# Patient Record
Sex: FEMALE | Race: WHITE | Employment: OTHER | URBAN - METROPOLITAN AREA
[De-identification: names, ages, dates, MRNs, and addresses within clinical notes are randomized per-mention and may not be internally consistent; named-entity substitution may affect disease eponyms.]

---

## 2017-01-04 ENCOUNTER — OFFICE VISIT (OUTPATIENT)
Dept: PULMONOLOGY | Age: 42
End: 2017-01-04

## 2017-01-04 VITALS
HEIGHT: 66 IN | WEIGHT: 205 LBS | RESPIRATION RATE: 18 BRPM | HEART RATE: 89 BPM | OXYGEN SATURATION: 98 % | DIASTOLIC BLOOD PRESSURE: 84 MMHG | TEMPERATURE: 97.8 F | SYSTOLIC BLOOD PRESSURE: 132 MMHG | BODY MASS INDEX: 32.95 KG/M2

## 2017-01-04 DIAGNOSIS — R59.0 MEDIASTINAL LYMPHADENOPATHY: Primary | ICD-10-CM

## 2017-01-04 PROCEDURE — 99243 OFF/OP CNSLTJ NEW/EST LOW 30: CPT | Performed by: INTERNAL MEDICINE

## 2017-01-04 RX ORDER — ASCORBIC ACID 500 MG
500 TABLET ORAL DAILY
COMMUNITY

## 2017-01-04 RX ORDER — THIAMINE MONONITRATE (VIT B1) 100 MG
100 TABLET ORAL DAILY
Status: ON HOLD | COMMUNITY
End: 2018-09-22

## 2017-01-25 ENCOUNTER — TELEPHONE (OUTPATIENT)
Dept: FAMILY MEDICINE CLINIC | Age: 42
End: 2017-01-25

## 2017-03-13 ENCOUNTER — TELEPHONE (OUTPATIENT)
Dept: PULMONOLOGY | Age: 42
End: 2017-03-13

## 2017-03-15 ENCOUNTER — HOSPITAL ENCOUNTER (OUTPATIENT)
Dept: CT IMAGING | Age: 42
Discharge: HOME | End: 2018-01-14
Attending: INTERNAL MEDICINE | Admitting: INTERNAL MEDICINE

## 2017-03-15 DIAGNOSIS — R59.0 LOCALIZED ENLARGED LYMPH NODES: ICD-10-CM

## 2017-04-20 ENCOUNTER — TELEPHONE (OUTPATIENT)
Dept: FAMILY MEDICINE CLINIC | Age: 42
End: 2017-04-20

## 2017-04-27 ENCOUNTER — OFFICE VISIT (OUTPATIENT)
Dept: FAMILY MEDICINE CLINIC | Age: 42
End: 2017-04-27

## 2017-04-27 VITALS
OXYGEN SATURATION: 98 % | WEIGHT: 216.6 LBS | BODY MASS INDEX: 34.81 KG/M2 | HEIGHT: 66 IN | TEMPERATURE: 98 F | RESPIRATION RATE: 14 BRPM | SYSTOLIC BLOOD PRESSURE: 102 MMHG | DIASTOLIC BLOOD PRESSURE: 72 MMHG | HEART RATE: 74 BPM

## 2017-04-27 DIAGNOSIS — R53.83 FATIGUE, UNSPECIFIED TYPE: Primary | ICD-10-CM

## 2017-04-27 DIAGNOSIS — D64.9 ANEMIA, UNSPECIFIED TYPE: ICD-10-CM

## 2017-04-27 DIAGNOSIS — R53.83 FATIGUE, UNSPECIFIED TYPE: ICD-10-CM

## 2017-04-27 LAB
A/G RATIO: 1.5 (ref 1.1–2.2)
ALBUMIN SERPL-MCNC: 4.1 G/DL (ref 3.4–5)
ALP BLD-CCNC: 75 U/L (ref 40–129)
ALT SERPL-CCNC: 29 U/L (ref 10–40)
ANION GAP SERPL CALCULATED.3IONS-SCNC: 15 MMOL/L (ref 3–16)
AST SERPL-CCNC: 21 U/L (ref 15–37)
BASOPHILS ABSOLUTE: 0 K/UL (ref 0–0.2)
BASOPHILS RELATIVE PERCENT: 0.4 %
BILIRUB SERPL-MCNC: <0.2 MG/DL (ref 0–1)
BUN BLDV-MCNC: 16 MG/DL (ref 7–20)
CALCIUM SERPL-MCNC: 8.7 MG/DL (ref 8.3–10.6)
CHLORIDE BLD-SCNC: 101 MMOL/L (ref 99–110)
CO2: 23 MMOL/L (ref 21–32)
CREAT SERPL-MCNC: 0.6 MG/DL (ref 0.6–1.1)
EOSINOPHILS ABSOLUTE: 0.1 K/UL (ref 0–0.6)
EOSINOPHILS RELATIVE PERCENT: 1.8 %
FERRITIN: 24.7 NG/ML (ref 15–150)
FOLATE: 19.46 NG/ML (ref 4.78–24.2)
GFR AFRICAN AMERICAN: >60
GFR NON-AFRICAN AMERICAN: >60
GLOBULIN: 2.8 G/DL
GLUCOSE BLD-MCNC: 104 MG/DL (ref 70–99)
HCT VFR BLD CALC: 35.8 % (ref 36–48)
HEMOGLOBIN: 11.2 G/DL (ref 12–16)
IRON SATURATION: 13 % (ref 15–50)
IRON: 48 UG/DL (ref 37–145)
LYMPHOCYTES ABSOLUTE: 1.7 K/UL (ref 1–5.1)
LYMPHOCYTES RELATIVE PERCENT: 20.7 %
MCH RBC QN AUTO: 24.2 PG (ref 26–34)
MCHC RBC AUTO-ENTMCNC: 31.2 G/DL (ref 31–36)
MCV RBC AUTO: 77.6 FL (ref 80–100)
MONOCYTES ABSOLUTE: 0.6 K/UL (ref 0–1.3)
MONOCYTES RELATIVE PERCENT: 7.8 %
NEUTROPHILS ABSOLUTE: 5.7 K/UL (ref 1.7–7.7)
NEUTROPHILS RELATIVE PERCENT: 69.3 %
PDW BLD-RTO: 22.8 % (ref 12.4–15.4)
PLATELET # BLD: 287 K/UL (ref 135–450)
PMV BLD AUTO: 9.3 FL (ref 5–10.5)
POTASSIUM SERPL-SCNC: 4.1 MMOL/L (ref 3.5–5.1)
RBC # BLD: 4.62 M/UL (ref 4–5.2)
SODIUM BLD-SCNC: 139 MMOL/L (ref 136–145)
TOTAL IRON BINDING CAPACITY: 376 UG/DL (ref 260–445)
TOTAL PROTEIN: 6.9 G/DL (ref 6.4–8.2)
TSH REFLEX: 2.76 UIU/ML (ref 0.27–4.2)
VITAMIN B-12: 507 PG/ML (ref 211–911)
WBC # BLD: 8.2 K/UL (ref 4–11)

## 2017-04-27 PROCEDURE — 99213 OFFICE O/P EST LOW 20 MIN: CPT | Performed by: NURSE PRACTITIONER

## 2017-04-27 ASSESSMENT — PATIENT HEALTH QUESTIONNAIRE - PHQ9
1. LITTLE INTEREST OR PLEASURE IN DOING THINGS: 0
SUM OF ALL RESPONSES TO PHQ9 QUESTIONS 1 & 2: 0
2. FEELING DOWN, DEPRESSED OR HOPELESS: 0
SUM OF ALL RESPONSES TO PHQ QUESTIONS 1-9: 0

## 2017-04-27 ASSESSMENT — ENCOUNTER SYMPTOMS
VISUAL CHANGE: 0
SWOLLEN GLANDS: 0
VOMITING: 0
NAUSEA: 0
CHANGE IN BOWEL HABIT: 0
COUGH: 0
SORE THROAT: 0
ABDOMINAL PAIN: 0

## 2017-04-28 DIAGNOSIS — D50.9 IRON DEFICIENCY ANEMIA, UNSPECIFIED IRON DEFICIENCY ANEMIA TYPE: Primary | ICD-10-CM

## 2017-08-01 ENCOUNTER — OFFICE VISIT (OUTPATIENT)
Dept: FAMILY MEDICINE CLINIC | Age: 42
End: 2017-08-01

## 2017-08-01 VITALS
HEART RATE: 75 BPM | BODY MASS INDEX: 35.52 KG/M2 | OXYGEN SATURATION: 98 % | TEMPERATURE: 98.1 F | HEIGHT: 66 IN | DIASTOLIC BLOOD PRESSURE: 76 MMHG | SYSTOLIC BLOOD PRESSURE: 130 MMHG | WEIGHT: 221 LBS

## 2017-08-01 DIAGNOSIS — J45.20 RAD (REACTIVE AIRWAY DISEASE), MILD INTERMITTENT, UNCOMPLICATED: ICD-10-CM

## 2017-08-01 DIAGNOSIS — J01.00 ACUTE NON-RECURRENT MAXILLARY SINUSITIS: Primary | ICD-10-CM

## 2017-08-01 PROCEDURE — 99213 OFFICE O/P EST LOW 20 MIN: CPT | Performed by: NURSE PRACTITIONER

## 2017-08-01 RX ORDER — ALBUTEROL SULFATE 90 UG/1
2 AEROSOL, METERED RESPIRATORY (INHALATION) EVERY 6 HOURS PRN
Qty: 1 INHALER | Refills: 0 | Status: SHIPPED | OUTPATIENT
Start: 2017-08-01 | End: 2017-08-11 | Stop reason: SINTOL

## 2017-08-01 RX ORDER — AZITHROMYCIN 250 MG/1
250 TABLET, FILM COATED ORAL DAILY
Qty: 6 TABLET | Refills: 0 | Status: SHIPPED | OUTPATIENT
Start: 2017-08-01 | End: 2017-08-09

## 2017-08-01 RX ORDER — BENZONATATE 100 MG/1
100 CAPSULE ORAL 3 TIMES DAILY PRN
Qty: 30 CAPSULE | Refills: 0 | Status: SHIPPED | OUTPATIENT
Start: 2017-08-01 | End: 2017-08-09

## 2017-08-01 RX ORDER — ATENOLOL 25 MG/1
TABLET ORAL
Refills: 11 | COMMUNITY
Start: 2017-07-07 | End: 2018-06-26

## 2017-08-01 ASSESSMENT — ENCOUNTER SYMPTOMS
RECTAL PAIN: 0
DIARRHEA: 1
COUGH: 1
NAUSEA: 0
BLOOD IN STOOL: 0
STRIDOR: 0
CHOKING: 0
HEARTBURN: 0
BACK PAIN: 0
TROUBLE SWALLOWING: 0
VOICE CHANGE: 0
CHEST TIGHTNESS: 0
WHEEZING: 0
SHORTNESS OF BREATH: 1
VOMITING: 0
ANAL BLEEDING: 0
SORE THROAT: 1
FACIAL SWELLING: 0
APNEA: 0
RHINORRHEA: 0
CONSTIPATION: 0
SINUS PRESSURE: 1
ABDOMINAL DISTENTION: 0
ABDOMINAL PAIN: 0
HEMOPTYSIS: 0

## 2017-08-09 ENCOUNTER — OFFICE VISIT (OUTPATIENT)
Dept: FAMILY MEDICINE CLINIC | Age: 42
End: 2017-08-09

## 2017-08-09 VITALS
HEART RATE: 75 BPM | WEIGHT: 222 LBS | BODY MASS INDEX: 35.68 KG/M2 | TEMPERATURE: 97.7 F | DIASTOLIC BLOOD PRESSURE: 90 MMHG | RESPIRATION RATE: 16 BRPM | OXYGEN SATURATION: 99 % | SYSTOLIC BLOOD PRESSURE: 150 MMHG | HEIGHT: 66 IN

## 2017-08-09 DIAGNOSIS — J40 BRONCHITIS: Primary | ICD-10-CM

## 2017-08-09 DIAGNOSIS — R00.2 HEART PALPITATIONS: ICD-10-CM

## 2017-08-09 PROCEDURE — 93000 ELECTROCARDIOGRAM COMPLETE: CPT | Performed by: NURSE PRACTITIONER

## 2017-08-09 PROCEDURE — 99213 OFFICE O/P EST LOW 20 MIN: CPT | Performed by: NURSE PRACTITIONER

## 2017-08-09 RX ORDER — FLUTICASONE PROPIONATE 50 MCG
2 SPRAY, SUSPENSION (ML) NASAL DAILY
Qty: 16 G | Refills: 0 | Status: SHIPPED | OUTPATIENT
Start: 2017-08-09 | End: 2018-06-06 | Stop reason: ALTCHOICE

## 2017-08-09 RX ORDER — METHYLPREDNISOLONE 4 MG/1
TABLET ORAL
Qty: 1 KIT | Refills: 0 | Status: ON HOLD | OUTPATIENT
Start: 2017-08-09 | End: 2017-08-14

## 2017-08-09 ASSESSMENT — ENCOUNTER SYMPTOMS
WHEEZING: 0
TROUBLE SWALLOWING: 0
CHEST TIGHTNESS: 0
SPUTUM PRODUCTION: 1
BACK PAIN: 1
STRIDOR: 0
SHORTNESS OF BREATH: 1
SORE THROAT: 1
ORTHOPNEA: 0

## 2017-08-12 PROBLEM — J20.9 ACUTE BRONCHITIS: Status: ACTIVE | Noted: 2017-08-12

## 2017-08-12 PROBLEM — J01.90 ACUTE SINUSITIS: Status: ACTIVE | Noted: 2017-08-12

## 2017-08-12 PROBLEM — R65.10 SIRS (SYSTEMIC INFLAMMATORY RESPONSE SYNDROME) (HCC): Status: ACTIVE | Noted: 2017-08-12

## 2017-08-12 PROBLEM — J06.9 URI (UPPER RESPIRATORY INFECTION): Status: ACTIVE | Noted: 2017-08-12

## 2017-08-12 PROBLEM — R06.02 SOB (SHORTNESS OF BREATH): Status: ACTIVE | Noted: 2017-08-12

## 2017-08-12 PROBLEM — E66.9 OBESITY (BMI 30-39.9): Chronic | Status: ACTIVE | Noted: 2017-08-12

## 2017-08-12 PROBLEM — Z87.891 FORMER SMOKER: Chronic | Status: ACTIVE | Noted: 2017-08-12

## 2017-08-12 PROBLEM — R53.83 FATIGUE: Status: ACTIVE | Noted: 2017-08-12

## 2017-08-12 PROBLEM — E87.20 METABOLIC ACIDOSIS, NORMAL ANION GAP (NAG): Status: ACTIVE | Noted: 2017-08-12

## 2017-08-12 PROBLEM — A41.9 SEPSIS (HCC): Status: ACTIVE | Noted: 2017-08-12

## 2017-08-12 PROBLEM — R07.9 CHEST PAIN: Status: ACTIVE | Noted: 2017-08-12

## 2017-08-12 PROBLEM — R53.83 FATIGUE: Chronic | Status: ACTIVE | Noted: 2017-08-12

## 2017-08-15 ENCOUNTER — CARE COORDINATION (OUTPATIENT)
Dept: CARE COORDINATION | Age: 42
End: 2017-08-15

## 2017-08-15 ENCOUNTER — TELEPHONE (OUTPATIENT)
Dept: PULMONOLOGY | Age: 42
End: 2017-08-15

## 2017-08-22 ENCOUNTER — OFFICE VISIT (OUTPATIENT)
Dept: PULMONOLOGY | Age: 42
End: 2017-08-22

## 2017-08-22 VITALS
DIASTOLIC BLOOD PRESSURE: 81 MMHG | WEIGHT: 221 LBS | OXYGEN SATURATION: 98 % | HEIGHT: 66 IN | SYSTOLIC BLOOD PRESSURE: 134 MMHG | HEART RATE: 62 BPM | TEMPERATURE: 98 F | BODY MASS INDEX: 35.52 KG/M2 | RESPIRATION RATE: 16 BRPM

## 2017-08-22 DIAGNOSIS — R59.0 MEDIASTINAL LYMPHADENOPATHY: Primary | ICD-10-CM

## 2017-08-22 PROCEDURE — 99213 OFFICE O/P EST LOW 20 MIN: CPT | Performed by: INTERNAL MEDICINE

## 2018-02-08 ENCOUNTER — OFFICE VISIT (OUTPATIENT)
Dept: PULMONOLOGY | Age: 43
End: 2018-02-08

## 2018-02-08 VITALS
HEIGHT: 66 IN | OXYGEN SATURATION: 99 % | BODY MASS INDEX: 35.36 KG/M2 | HEART RATE: 74 BPM | SYSTOLIC BLOOD PRESSURE: 124 MMHG | WEIGHT: 220 LBS | TEMPERATURE: 98.4 F | RESPIRATION RATE: 18 BRPM | DIASTOLIC BLOOD PRESSURE: 76 MMHG

## 2018-02-08 DIAGNOSIS — R06.83 SNORING: Primary | ICD-10-CM

## 2018-02-08 DIAGNOSIS — R06.81 WITNESSED EPISODE OF APNEA: ICD-10-CM

## 2018-02-08 PROCEDURE — 99213 OFFICE O/P EST LOW 20 MIN: CPT | Performed by: NURSE PRACTITIONER

## 2018-02-08 ASSESSMENT — SLEEP AND FATIGUE QUESTIONNAIRES
HOW LIKELY ARE YOU TO NOD OFF OR FALL ASLEEP WHILE WATCHING TV: 1
HOW LIKELY ARE YOU TO NOD OFF OR FALL ASLEEP WHILE SITTING QUIETLY AFTER LUNCH WITHOUT ALCOHOL: 0
HOW LIKELY ARE YOU TO NOD OFF OR FALL ASLEEP WHILE LYING DOWN TO REST IN THE AFTERNOON WHEN CIRCUMSTANCES PERMIT: 1
NECK CIRCUMFERENCE (INCHES): 16
HOW LIKELY ARE YOU TO NOD OFF OR FALL ASLEEP WHILE SITTING AND TALKING TO SOMEONE: 0
HOW LIKELY ARE YOU TO NOD OFF OR FALL ASLEEP IN A CAR, WHILE STOPPED FOR A FEW MINUTES IN TRAFFIC: 0
HOW LIKELY ARE YOU TO NOD OFF OR FALL ASLEEP WHILE SITTING INACTIVE IN A PUBLIC PLACE: 0
ESS TOTAL SCORE: 2
HOW LIKELY ARE YOU TO NOD OFF OR FALL ASLEEP WHILE SITTING AND READING: 0
HOW LIKELY ARE YOU TO NOD OFF OR FALL ASLEEP WHEN YOU ARE A PASSENGER IN A CAR FOR AN HOUR WITHOUT A BREAK: 0

## 2018-02-08 NOTE — PROGRESS NOTES
Laterality Date    APPENDECTOMY      CORONARY ANGIOPLASTY WITH STENT PLACEMENT      KNEE ARTHROSCOPY      LEG SURGERY      NECK SURGERY      TONSILLECTOMY         Allergies:  has No Known Allergies. Social History:    TOBACCO:   reports that she quit smoking about 15 months ago. Her smoking use included Cigarettes. She has a 5.00 pack-year smoking history. She has never used smokeless tobacco.  ETOH:   reports that she drinks alcohol. Family History:       Problem Relation Age of Onset    Heart Disease Mother     High Blood Pressure Mother     Heart Disease Father     High Blood Pressure Father        Current Medications:    Current Outpatient Prescriptions:     famotidine (PEPCID) 20 MG tablet, Take 1 tablet by mouth 2 times daily While taking steroids, Disp: 10 tablet, Rfl: 3    vitamin D3 (CHOLECALCIFEROL) 400 units TABS tablet, Take 2 tablets by mouth daily, Disp: 30 tablet, Rfl: 0    fluticasone (FLONASE) 50 MCG/ACT nasal spray, 2 sprays by Nasal route daily, Disp: 16 g, Rfl: 0    atenolol (TENORMIN) 25 MG tablet, TAKE 1 TABLET BY MOUTH ONE TIME A DAY, Disp: , Rfl: 11    Coenzyme Q10 (CO Q 10 PO), Take by mouth, Disp: , Rfl:     vitamin B-1 (THIAMINE) 100 MG tablet, Take 100 mg by mouth daily, Disp: , Rfl:     Ascorbic Acid (VITAMIN C) 500 MG tablet, Take 500 mg by mouth daily, Disp: , Rfl:     nitroGLYCERIN (NITRODUR) 0.4 MG/HR, Place 1 patch onto the skin daily, Disp: , Rfl:     atorvastatin (LIPITOR) 80 MG tablet, Take 80 mg by mouth daily, Disp: , Rfl:     aspirin 81 MG tablet, Take 81 mg by mouth daily, Disp: , Rfl:     therapeutic multivitamin-minerals (THERAGRAN-M) tablet, Take 1 tablet by mouth daily.   , Disp: , Rfl:       REVIEW OF SYSTEMS:  Constitutional: Negative for fever  HENT: Negative for sore throat  Eyes: Negative for redness   Respiratory: Negative for dyspnea, cough  Cardiovascular: Negative for chest pain  Gastrointestinal: Negative for vomiting, diarrhea

## 2018-03-12 ENCOUNTER — HOSPITAL ENCOUNTER (OUTPATIENT)
Dept: SLEEP MEDICINE | Age: 43
Discharge: HOME | End: 2018-03-12
Attending: NURSE PRACTITIONER | Admitting: NURSE PRACTITIONER

## 2018-03-12 DIAGNOSIS — R06.83 SNORING: ICD-10-CM

## 2018-03-12 DIAGNOSIS — R06.81 WITNESSED EPISODE OF APNEA: ICD-10-CM

## 2018-03-15 DIAGNOSIS — G47.33 OSA (OBSTRUCTIVE SLEEP APNEA): Primary | ICD-10-CM

## 2018-03-19 ENCOUNTER — OFFICE VISIT (OUTPATIENT)
Dept: FAMILY MEDICINE CLINIC | Age: 43
End: 2018-03-19

## 2018-03-19 VITALS
HEART RATE: 67 BPM | WEIGHT: 227 LBS | SYSTOLIC BLOOD PRESSURE: 136 MMHG | DIASTOLIC BLOOD PRESSURE: 88 MMHG | BODY MASS INDEX: 36.48 KG/M2 | HEIGHT: 66 IN | OXYGEN SATURATION: 97 %

## 2018-03-19 DIAGNOSIS — J02.9 ACUTE PHARYNGITIS, UNSPECIFIED ETIOLOGY: Primary | ICD-10-CM

## 2018-03-19 LAB
INFLUENZA A ANTIBODY: NORMAL
INFLUENZA B ANTIBODY: NORMAL

## 2018-03-19 PROCEDURE — 87804 INFLUENZA ASSAY W/OPTIC: CPT | Performed by: NURSE PRACTITIONER

## 2018-03-19 PROCEDURE — 99213 OFFICE O/P EST LOW 20 MIN: CPT | Performed by: NURSE PRACTITIONER

## 2018-03-19 RX ORDER — AMOXICILLIN 500 MG/1
500 CAPSULE ORAL 2 TIMES DAILY
Qty: 20 CAPSULE | Refills: 0 | Status: SHIPPED | OUTPATIENT
Start: 2018-03-19 | End: 2018-03-29

## 2018-03-19 ASSESSMENT — ENCOUNTER SYMPTOMS
SORE THROAT: 1
VISUAL CHANGE: 0
RHINORRHEA: 1
COUGH: 1
CHEST TIGHTNESS: 1
SWOLLEN GLANDS: 1
WHEEZING: 0
NAUSEA: 1
CHANGE IN BOWEL HABIT: 1
FLU SYMPTOMS: 1
SHORTNESS OF BREATH: 1
VOMITING: 0
SINUS PRESSURE: 1
ABDOMINAL PAIN: 0
FACIAL SWELLING: 0
DIARRHEA: 1

## 2018-03-19 NOTE — PATIENT INSTRUCTIONS
Amoxicillin twice a day for 10 days   Increase fluid intake  Rest  Follow up if 1 week if no improvement    Ideally your blood pressure goal should be below 130/80. I usually add or adjust medication if the blood pressure is consistently elevated above 140/90. You can learn more about blood pressure and ways to incorporate a healthy lifestlye to help treat it from the American Heart Association website: www.heart. org under the  Getting Healthy link, and the [address] website: www.nhlbi.nih.gov/hbp    A for Activity  It helps your blood pressure when you exercise regularly. You should try to exercise at least 3 times a week for 20 minutes at a pace that you can comfortably carry on a conversation without being out of breath. B for BMI  The Body Mass Index should be below 30. This is your weight and height measurement. A BMI below 30 is preferred to help lower the risk of Type 2 diabetes, high cholesterol, heart disease, high blood pressure, sleep apnea, gallbladder disease and strokes. C for Control your Salt Intake  Avoid adding salt to your food. Avoid processed food, fast food, and junk food which all has high levels of sodium added. Read labels and get no more than 1500-2300mg of sodium a day.

## 2018-03-21 ENCOUNTER — TELEPHONE (OUTPATIENT)
Dept: PULMONOLOGY | Age: 43
End: 2018-03-21

## 2018-03-21 NOTE — TELEPHONE ENCOUNTER
Pt called and states that she was to have PAP orders sent to [facility] last week, and she called [facility] and they do not have orders. Printed and re-faxed orders. Will f/u to make sure orders rec'd.

## 2018-03-22 ENCOUNTER — TELEPHONE (OUTPATIENT)
Dept: FAMILY MEDICINE CLINIC | Age: 43
End: 2018-03-22

## 2018-03-22 NOTE — TELEPHONE ENCOUNTER
cm 0.6 - 0.9     Aorta                                 Value    Reference   Aortic root ID, ED                    3.1   cm <4.2    Legend:  (L)  and  (H)  mj values outside specified reference range.     Layton Lopez MD, PhD  [id number]

## 2018-06-06 PROBLEM — J01.90 ACUTE SINUSITIS: Status: RESOLVED | Noted: 2017-08-12 | Resolved: 2018-06-06

## 2018-06-06 PROBLEM — A41.9 SEPSIS (HCC): Status: RESOLVED | Noted: 2017-08-12 | Resolved: 2018-06-06

## 2018-06-06 PROBLEM — G47.33 OSA ON CPAP: Chronic | Status: ACTIVE | Noted: 2018-06-06

## 2018-06-06 PROBLEM — I16.0 HYPERTENSIVE URGENCY: Status: ACTIVE | Noted: 2018-06-06

## 2018-06-06 PROBLEM — Z99.89 OSA ON CPAP: Chronic | Status: ACTIVE | Noted: 2018-06-06

## 2018-06-06 PROBLEM — K21.9 GERD (GASTROESOPHAGEAL REFLUX DISEASE): Chronic | Status: ACTIVE | Noted: 2018-06-06

## 2018-06-06 PROBLEM — R06.02 SOB (SHORTNESS OF BREATH): Status: RESOLVED | Noted: 2017-08-12 | Resolved: 2018-06-06

## 2018-06-06 PROBLEM — D72.829 LEUKOCYTOSIS: Status: ACTIVE | Noted: 2018-06-06

## 2018-06-06 PROBLEM — J20.9 ACUTE BRONCHITIS: Status: RESOLVED | Noted: 2017-08-12 | Resolved: 2018-06-06

## 2018-06-07 ENCOUNTER — CARE COORDINATION (OUTPATIENT)
Dept: CASE MANAGEMENT | Age: 43
End: 2018-06-07

## 2018-06-07 DIAGNOSIS — E66.9 OBESITY (BMI 30-39.9): Primary | Chronic | ICD-10-CM

## 2018-06-07 PROCEDURE — 1111F DSCHRG MED/CURRENT MED MERGE: CPT | Performed by: FAMILY MEDICINE

## 2018-06-26 ENCOUNTER — OFFICE VISIT (OUTPATIENT)
Dept: PULMONOLOGY | Age: 43
End: 2018-06-26

## 2018-06-26 VITALS
HEIGHT: 66 IN | WEIGHT: 232 LBS | SYSTOLIC BLOOD PRESSURE: 120 MMHG | BODY MASS INDEX: 37.28 KG/M2 | DIASTOLIC BLOOD PRESSURE: 72 MMHG | HEART RATE: 88 BPM | RESPIRATION RATE: 18 BRPM | TEMPERATURE: 98.1 F | OXYGEN SATURATION: 96 %

## 2018-06-26 DIAGNOSIS — E66.9 OBESITY (BMI 30-39.9): Chronic | ICD-10-CM

## 2018-06-26 DIAGNOSIS — Z99.89 OSA ON CPAP: Primary | Chronic | ICD-10-CM

## 2018-06-26 DIAGNOSIS — G47.33 OSA ON CPAP: Primary | Chronic | ICD-10-CM

## 2018-06-26 PROCEDURE — 99213 OFFICE O/P EST LOW 20 MIN: CPT | Performed by: NURSE PRACTITIONER

## 2018-06-26 RX ORDER — ATENOLOL 50 MG/1
50 TABLET ORAL DAILY
Status: ON HOLD | COMMUNITY
End: 2018-09-22

## 2018-06-26 ASSESSMENT — SLEEP AND FATIGUE QUESTIONNAIRES
HOW LIKELY ARE YOU TO NOD OFF OR FALL ASLEEP IN A CAR, WHILE STOPPED FOR A FEW MINUTES IN TRAFFIC: 0
NECK CIRCUMFERENCE (INCHES): 14
HOW LIKELY ARE YOU TO NOD OFF OR FALL ASLEEP WHEN YOU ARE A PASSENGER IN A CAR FOR AN HOUR WITHOUT A BREAK: 0
HOW LIKELY ARE YOU TO NOD OFF OR FALL ASLEEP WHILE LYING DOWN TO REST IN THE AFTERNOON WHEN CIRCUMSTANCES PERMIT: 0
HOW LIKELY ARE YOU TO NOD OFF OR FALL ASLEEP WHILE SITTING AND READING: 0
HOW LIKELY ARE YOU TO NOD OFF OR FALL ASLEEP WHILE SITTING INACTIVE IN A PUBLIC PLACE: 0
ESS TOTAL SCORE: 0
HOW LIKELY ARE YOU TO NOD OFF OR FALL ASLEEP WHILE SITTING QUIETLY AFTER LUNCH WITHOUT ALCOHOL: 0
HOW LIKELY ARE YOU TO NOD OFF OR FALL ASLEEP WHILE SITTING AND TALKING TO SOMEONE: 0
HOW LIKELY ARE YOU TO NOD OFF OR FALL ASLEEP WHILE WATCHING TV: 0

## 2018-06-28 ENCOUNTER — OFFICE VISIT (OUTPATIENT)
Dept: FAMILY MEDICINE CLINIC | Age: 43
End: 2018-06-28

## 2018-06-28 VITALS
SYSTOLIC BLOOD PRESSURE: 120 MMHG | HEIGHT: 66 IN | HEART RATE: 77 BPM | DIASTOLIC BLOOD PRESSURE: 78 MMHG | BODY MASS INDEX: 37.12 KG/M2 | WEIGHT: 231 LBS | OXYGEN SATURATION: 96 %

## 2018-06-28 DIAGNOSIS — I10 ESSENTIAL HYPERTENSION: Chronic | ICD-10-CM

## 2018-06-28 DIAGNOSIS — R07.9 CHEST PAIN, UNSPECIFIED TYPE: ICD-10-CM

## 2018-06-28 DIAGNOSIS — E03.9 ACQUIRED HYPOTHYROIDISM: Primary | ICD-10-CM

## 2018-06-28 PROCEDURE — 99213 OFFICE O/P EST LOW 20 MIN: CPT | Performed by: NURSE PRACTITIONER

## 2018-06-28 ASSESSMENT — ENCOUNTER SYMPTOMS
COUGH: 0
VOMITING: 0
SHORTNESS OF BREATH: 0
NAUSEA: 0
DIARRHEA: 0

## 2018-08-16 DIAGNOSIS — E03.9 ACQUIRED HYPOTHYROIDISM: ICD-10-CM

## 2018-08-16 LAB — TSH REFLEX: 1.27 UIU/ML (ref 0.27–4.2)

## 2018-09-22 ENCOUNTER — HOSPITAL ENCOUNTER (OUTPATIENT)
Age: 43
Setting detail: OBSERVATION
Discharge: HOME | End: 2018-09-22
Attending: EMERGENCY MEDICINE | Admitting: HOSPITALIST
Payer: COMMERCIAL

## 2018-09-22 ENCOUNTER — APPOINTMENT (OUTPATIENT)
Dept: NUCLEAR MEDICINE | Age: 43
End: 2018-09-22
Payer: COMMERCIAL

## 2018-09-22 ENCOUNTER — APPOINTMENT (OUTPATIENT)
Dept: CT IMAGING | Age: 43
End: 2018-09-22
Payer: COMMERCIAL

## 2018-09-22 ENCOUNTER — APPOINTMENT (OUTPATIENT)
Dept: GENERAL RADIOLOGY | Age: 43
End: 2018-09-22
Payer: COMMERCIAL

## 2018-09-22 VITALS
RESPIRATION RATE: 18 BRPM | WEIGHT: 225.6 LBS | HEIGHT: 66 IN | BODY MASS INDEX: 36.26 KG/M2 | HEART RATE: 66 BPM | DIASTOLIC BLOOD PRESSURE: 80 MMHG | TEMPERATURE: 98.2 F | OXYGEN SATURATION: 100 % | SYSTOLIC BLOOD PRESSURE: 129 MMHG

## 2018-09-22 DIAGNOSIS — R07.9 CHEST PAIN, UNSPECIFIED TYPE: Primary | ICD-10-CM

## 2018-09-22 PROBLEM — D72.829 LEUKOCYTOSIS: Status: RESOLVED | Noted: 2018-06-06 | Resolved: 2018-09-22

## 2018-09-22 PROBLEM — I16.0 HYPERTENSIVE URGENCY: Status: RESOLVED | Noted: 2018-06-06 | Resolved: 2018-09-22

## 2018-09-22 LAB
A/G RATIO: 1.2 (ref 1.1–2.2)
ALBUMIN SERPL-MCNC: 3.8 G/DL (ref 3.4–5)
ALP BLD-CCNC: 63 U/L (ref 40–129)
ALT SERPL-CCNC: 15 U/L (ref 10–40)
ANION GAP SERPL CALCULATED.3IONS-SCNC: 13 MMOL/L (ref 3–16)
AST SERPL-CCNC: 14 U/L (ref 15–37)
BASOPHILS ABSOLUTE: 0.1 K/UL (ref 0–0.2)
BASOPHILS RELATIVE PERCENT: 0.5 %
BILIRUB SERPL-MCNC: <0.2 MG/DL (ref 0–1)
BUN BLDV-MCNC: 17 MG/DL (ref 7–20)
CALCIUM SERPL-MCNC: 9.3 MG/DL (ref 8.3–10.6)
CHLORIDE BLD-SCNC: 102 MMOL/L (ref 99–110)
CO2: 22 MMOL/L (ref 21–32)
CREAT SERPL-MCNC: 0.6 MG/DL (ref 0.6–1.1)
EOSINOPHILS ABSOLUTE: 0.4 K/UL (ref 0–0.6)
EOSINOPHILS RELATIVE PERCENT: 3.2 %
GFR AFRICAN AMERICAN: >60
GFR NON-AFRICAN AMERICAN: >60
GLOBULIN: 3.3 G/DL
GLUCOSE BLD-MCNC: 103 MG/DL (ref 70–99)
HCG QUALITATIVE: NEGATIVE
HCT VFR BLD CALC: 38.1 % (ref 36–48)
HEMOGLOBIN: 12.4 G/DL (ref 12–16)
LV EF: 73 %
LVEF MODALITY: NORMAL
LYMPHOCYTES ABSOLUTE: 2.4 K/UL (ref 1–5.1)
LYMPHOCYTES RELATIVE PERCENT: 20.1 %
MCH RBC QN AUTO: 26.9 PG (ref 26–34)
MCHC RBC AUTO-ENTMCNC: 32.6 G/DL (ref 31–36)
MCV RBC AUTO: 82.5 FL (ref 80–100)
MONOCYTES ABSOLUTE: 0.8 K/UL (ref 0–1.3)
MONOCYTES RELATIVE PERCENT: 7 %
NEUTROPHILS ABSOLUTE: 8.3 K/UL (ref 1.7–7.7)
NEUTROPHILS RELATIVE PERCENT: 69.2 %
PDW BLD-RTO: 14.3 % (ref 12.4–15.4)
PLATELET # BLD: 284 K/UL (ref 135–450)
PMV BLD AUTO: 9.2 FL (ref 5–10.5)
POTASSIUM SERPL-SCNC: 4 MMOL/L (ref 3.5–5.1)
RBC # BLD: 4.62 M/UL (ref 4–5.2)
SODIUM BLD-SCNC: 137 MMOL/L (ref 136–145)
TOTAL PROTEIN: 7.1 G/DL (ref 6.4–8.2)
TROPONIN: <0.01 NG/ML
TROPONIN: <0.01 NG/ML
WBC # BLD: 12.1 K/UL (ref 4–11)

## 2018-09-22 PROCEDURE — 84484 ASSAY OF TROPONIN QUANT: CPT

## 2018-09-22 PROCEDURE — 6360000004 HC RX CONTRAST MEDICATION: Performed by: EMERGENCY MEDICINE

## 2018-09-22 PROCEDURE — 71275 CT ANGIOGRAPHY CHEST: CPT

## 2018-09-22 PROCEDURE — 78452 HT MUSCLE IMAGE SPECT MULT: CPT

## 2018-09-22 PROCEDURE — 99245 OFF/OP CONSLTJ NEW/EST HI 55: CPT | Performed by: INTERNAL MEDICINE

## 2018-09-22 PROCEDURE — 99285 EMERGENCY DEPT VISIT HI MDM: CPT

## 2018-09-22 PROCEDURE — 6370000000 HC RX 637 (ALT 250 FOR IP): Performed by: INTERNAL MEDICINE

## 2018-09-22 PROCEDURE — 84703 CHORIONIC GONADOTROPIN ASSAY: CPT

## 2018-09-22 PROCEDURE — 3430000000 HC RX DIAGNOSTIC RADIOPHARMACEUTICAL: Performed by: INTERNAL MEDICINE

## 2018-09-22 PROCEDURE — 6370000000 HC RX 637 (ALT 250 FOR IP): Performed by: HOSPITALIST

## 2018-09-22 PROCEDURE — G0378 HOSPITAL OBSERVATION PER HR: HCPCS

## 2018-09-22 PROCEDURE — 96374 THER/PROPH/DIAG INJ IV PUSH: CPT

## 2018-09-22 PROCEDURE — 36415 COLL VENOUS BLD VENIPUNCTURE: CPT

## 2018-09-22 PROCEDURE — 93010 ELECTROCARDIOGRAM REPORT: CPT | Performed by: INTERNAL MEDICINE

## 2018-09-22 PROCEDURE — 2580000003 HC RX 258: Performed by: HOSPITALIST

## 2018-09-22 PROCEDURE — 71046 X-RAY EXAM CHEST 2 VIEWS: CPT

## 2018-09-22 PROCEDURE — 93005 ELECTROCARDIOGRAM TRACING: CPT | Performed by: EMERGENCY MEDICINE

## 2018-09-22 PROCEDURE — 96375 TX/PRO/DX INJ NEW DRUG ADDON: CPT

## 2018-09-22 PROCEDURE — 6360000002 HC RX W HCPCS: Performed by: EMERGENCY MEDICINE

## 2018-09-22 PROCEDURE — A9502 TC99M TETROFOSMIN: HCPCS | Performed by: INTERNAL MEDICINE

## 2018-09-22 PROCEDURE — 6370000000 HC RX 637 (ALT 250 FOR IP): Performed by: EMERGENCY MEDICINE

## 2018-09-22 PROCEDURE — 80053 COMPREHEN METABOLIC PANEL: CPT

## 2018-09-22 PROCEDURE — 93017 CV STRESS TEST TRACING ONLY: CPT

## 2018-09-22 PROCEDURE — 85025 COMPLETE CBC W/AUTO DIFF WBC: CPT

## 2018-09-22 RX ORDER — SODIUM CHLORIDE 0.9 % (FLUSH) 0.9 %
10 SYRINGE (ML) INJECTION EVERY 12 HOURS SCHEDULED
Status: DISCONTINUED | OUTPATIENT
Start: 2018-09-22 | End: 2018-09-22 | Stop reason: HOSPADM

## 2018-09-22 RX ORDER — ISOSORBIDE MONONITRATE 30 MG/1
30 TABLET, EXTENDED RELEASE ORAL DAILY
Qty: 30 TABLET | Refills: 1 | Status: SHIPPED | OUTPATIENT
Start: 2018-09-22 | End: 2019-02-27 | Stop reason: ALTCHOICE

## 2018-09-22 RX ORDER — MORPHINE SULFATE 4 MG/ML
2 INJECTION, SOLUTION INTRAMUSCULAR; INTRAVENOUS
Status: DISCONTINUED | OUTPATIENT
Start: 2018-09-22 | End: 2018-09-22 | Stop reason: HOSPADM

## 2018-09-22 RX ORDER — M-VIT,TX,IRON,MINS/CALC/FOLIC 27MG-0.4MG
1 TABLET ORAL DAILY
Status: DISCONTINUED | OUTPATIENT
Start: 2018-09-22 | End: 2018-09-22 | Stop reason: HOSPADM

## 2018-09-22 RX ORDER — FAMOTIDINE 20 MG/1
20 TABLET, FILM COATED ORAL 2 TIMES DAILY
Status: CANCELLED | OUTPATIENT
Start: 2018-09-22

## 2018-09-22 RX ORDER — THIAMINE MONONITRATE (VIT B1) 100 MG
100 TABLET ORAL DAILY
Status: DISCONTINUED | OUTPATIENT
Start: 2018-09-22 | End: 2018-09-22 | Stop reason: HOSPADM

## 2018-09-22 RX ORDER — ASPIRIN 81 MG/1
324 TABLET, CHEWABLE ORAL ONCE
Status: COMPLETED | OUTPATIENT
Start: 2018-09-22 | End: 2018-09-22

## 2018-09-22 RX ORDER — ONDANSETRON 2 MG/ML
4 INJECTION INTRAMUSCULAR; INTRAVENOUS ONCE
Status: COMPLETED | OUTPATIENT
Start: 2018-09-22 | End: 2018-09-22

## 2018-09-22 RX ORDER — ONDANSETRON 2 MG/ML
4 INJECTION INTRAMUSCULAR; INTRAVENOUS EVERY 6 HOURS PRN
Status: DISCONTINUED | OUTPATIENT
Start: 2018-09-22 | End: 2018-09-22 | Stop reason: HOSPADM

## 2018-09-22 RX ORDER — ACETAMINOPHEN 325 MG/1
650 TABLET ORAL EVERY 4 HOURS PRN
Status: DISCONTINUED | OUTPATIENT
Start: 2018-09-22 | End: 2018-09-22 | Stop reason: HOSPADM

## 2018-09-22 RX ORDER — ASPIRIN 81 MG/1
81 TABLET ORAL DAILY
Status: DISCONTINUED | OUTPATIENT
Start: 2018-09-22 | End: 2018-09-22 | Stop reason: HOSPADM

## 2018-09-22 RX ORDER — POTASSIUM CHLORIDE 20 MEQ/1
40 TABLET, EXTENDED RELEASE ORAL PRN
Status: DISCONTINUED | OUTPATIENT
Start: 2018-09-22 | End: 2018-09-22 | Stop reason: HOSPADM

## 2018-09-22 RX ORDER — FERROUS SULFATE 325(65) MG
325 TABLET ORAL
COMMUNITY

## 2018-09-22 RX ORDER — OMEGA-3S/DHA/EPA/FISH OIL/D3 300MG-1000
800 CAPSULE ORAL DAILY
Status: DISCONTINUED | OUTPATIENT
Start: 2018-09-22 | End: 2018-09-22 | Stop reason: HOSPADM

## 2018-09-22 RX ORDER — OMEGA-3/DHA/EPA/FISH OIL 300-1000MG
2 CAPSULE ORAL DAILY
Status: CANCELLED | OUTPATIENT
Start: 2018-09-22

## 2018-09-22 RX ORDER — POTASSIUM CHLORIDE 20MEQ/15ML
40 LIQUID (ML) ORAL PRN
Status: DISCONTINUED | OUTPATIENT
Start: 2018-09-22 | End: 2018-09-22 | Stop reason: HOSPADM

## 2018-09-22 RX ORDER — ASCORBIC ACID 500 MG
500 TABLET ORAL DAILY
Status: DISCONTINUED | OUTPATIENT
Start: 2018-09-22 | End: 2018-09-22 | Stop reason: HOSPADM

## 2018-09-22 RX ORDER — NITROGLYCERIN 80 MG/1
1 PATCH TRANSDERMAL DAILY
Status: DISCONTINUED | OUTPATIENT
Start: 2018-09-22 | End: 2018-09-22

## 2018-09-22 RX ORDER — MAGNESIUM SULFATE 1 G/100ML
1 INJECTION INTRAVENOUS PRN
Status: DISCONTINUED | OUTPATIENT
Start: 2018-09-22 | End: 2018-09-22 | Stop reason: HOSPADM

## 2018-09-22 RX ORDER — SODIUM CHLORIDE 0.9 % (FLUSH) 0.9 %
10 SYRINGE (ML) INJECTION PRN
Status: DISCONTINUED | OUTPATIENT
Start: 2018-09-22 | End: 2018-09-22 | Stop reason: HOSPADM

## 2018-09-22 RX ORDER — MORPHINE SULFATE 4 MG/ML
4 INJECTION, SOLUTION INTRAMUSCULAR; INTRAVENOUS
Status: DISCONTINUED | OUTPATIENT
Start: 2018-09-22 | End: 2018-09-22 | Stop reason: HOSPADM

## 2018-09-22 RX ORDER — ATORVASTATIN CALCIUM 80 MG/1
80 TABLET, FILM COATED ORAL DAILY
Status: DISCONTINUED | OUTPATIENT
Start: 2018-09-22 | End: 2018-09-22 | Stop reason: HOSPADM

## 2018-09-22 RX ORDER — M-VIT,TX,IRON,MINS/CALC/FOLIC 27MG-0.4MG
1 TABLET ORAL DAILY
Status: DISCONTINUED | OUTPATIENT
Start: 2018-09-22 | End: 2018-09-22

## 2018-09-22 RX ORDER — ATENOLOL 25 MG/1
25 TABLET ORAL DAILY
Status: DISCONTINUED | OUTPATIENT
Start: 2018-09-22 | End: 2018-09-22 | Stop reason: HOSPADM

## 2018-09-22 RX ORDER — POTASSIUM CHLORIDE 7.45 MG/ML
10 INJECTION INTRAVENOUS PRN
Status: DISCONTINUED | OUTPATIENT
Start: 2018-09-22 | End: 2018-09-22 | Stop reason: HOSPADM

## 2018-09-22 RX ORDER — ISOSORBIDE MONONITRATE 30 MG/1
30 TABLET, EXTENDED RELEASE ORAL DAILY
Status: DISCONTINUED | OUTPATIENT
Start: 2018-09-22 | End: 2018-09-22 | Stop reason: HOSPADM

## 2018-09-22 RX ADMIN — Medication 1 TABLET: at 17:56

## 2018-09-22 RX ADMIN — SODIUM CHLORIDE, PRESERVATIVE FREE 10 ML: 5 INJECTION INTRAVENOUS at 10:07

## 2018-09-22 RX ADMIN — OXYCODONE HYDROCHLORIDE AND ACETAMINOPHEN 500 MG: 500 TABLET ORAL at 17:56

## 2018-09-22 RX ADMIN — ATENOLOL 25 MG: 25 TABLET ORAL at 16:44

## 2018-09-22 RX ADMIN — ATORVASTATIN CALCIUM 80 MG: 80 TABLET, FILM COATED ORAL at 16:44

## 2018-09-22 RX ADMIN — LEVOTHYROXINE SODIUM 75 MCG: 50 TABLET ORAL at 10:06

## 2018-09-22 RX ADMIN — ISOSORBIDE MONONITRATE 30 MG: 30 TABLET, EXTENDED RELEASE ORAL at 17:56

## 2018-09-22 RX ADMIN — ASPIRIN 81 MG: 81 TABLET, COATED ORAL at 16:44

## 2018-09-22 RX ADMIN — HYDROMORPHONE HYDROCHLORIDE 0.5 MG: 1 INJECTION, SOLUTION INTRAMUSCULAR; INTRAVENOUS; SUBCUTANEOUS at 04:01

## 2018-09-22 RX ADMIN — NITROGLYCERIN 1 INCH: 20 OINTMENT TOPICAL at 04:02

## 2018-09-22 RX ADMIN — ONDANSETRON 4 MG: 2 INJECTION INTRAMUSCULAR; INTRAVENOUS at 04:01

## 2018-09-22 RX ADMIN — TETROFOSMIN 32.2 MILLICURIE: 0.23 INJECTION, POWDER, LYOPHILIZED, FOR SOLUTION INTRAVENOUS at 14:15

## 2018-09-22 RX ADMIN — TETROFOSMIN 11.8 MILLICURIE: 0.23 INJECTION, POWDER, LYOPHILIZED, FOR SOLUTION INTRAVENOUS at 12:20

## 2018-09-22 RX ADMIN — CHOLECALCIFEROL TAB 10 MCG (400 UNIT) 800 UNITS: 10 TAB at 16:43

## 2018-09-22 RX ADMIN — ASPIRIN 81 MG 324 MG: 81 TABLET ORAL at 04:02

## 2018-09-22 RX ADMIN — IOPAMIDOL 100 ML: 755 INJECTION, SOLUTION INTRAVENOUS at 04:52

## 2018-09-22 ASSESSMENT — PAIN SCALES - GENERAL
PAINLEVEL_OUTOF10: 7
PAINLEVEL_OUTOF10: 2
PAINLEVEL_OUTOF10: 2
PAINLEVEL_OUTOF10: 7
PAINLEVEL_OUTOF10: 0
PAINLEVEL_OUTOF10: 4

## 2018-09-22 ASSESSMENT — PAIN DESCRIPTION - LOCATION
LOCATION: CHEST;BACK
LOCATION: BACK
LOCATION: CHEST;BACK

## 2018-09-22 ASSESSMENT — PAIN DESCRIPTION - DESCRIPTORS
DESCRIPTORS: DISCOMFORT;DULL
DESCRIPTORS: DISCOMFORT;DULL

## 2018-09-22 ASSESSMENT — PAIN DESCRIPTION - PROGRESSION: CLINICAL_PROGRESSION: OTHER (COMMENT)

## 2018-09-22 ASSESSMENT — HEART SCORE: ECG: 0

## 2018-09-22 ASSESSMENT — PAIN DESCRIPTION - PAIN TYPE
TYPE: ACUTE PAIN
TYPE: ACUTE PAIN

## 2018-09-22 NOTE — CONSULTS
estimated at 55%.   No regional wall motion abnormalities.   Normal left ventricular diastolic filling pressure.   Mild mitral regurgitation.   Trivial tricuspid regurgitation.   Systolic pulmonary artery pressure (SPAP) is normal estimated at 30 mmHg   (Right atrial pressure of 3 mmHg). CATH:   11/23/2016  Mild CAD w/ patent LAD stent  LVEF 50-55%      VASCULAR:   9/22/18  CTA  No evidence of an acute aortic syndrome.       Negative for acute pulmonary embolism.       Mild mediastinal and hilar lymphadenopathy is indeterminate though presumably   reactive. Tele SR no events     Impression/Recommendations    Ms. Valentin Aponte is a 38 y.o. female patient of Dr. Grzegorz Reardon ([facility]):     Chest pain, concerning for typical angina   CAD- Hx. NIKOLE-LAD 2016 in [place] details unknown, patent during 2016 coronary relook  Hx. Symptomatic PVCs   Preserved LVEF June 2018 TTE   Hypertension controlled   Hyperlipidemia stable    Obesity  GERD  Family history of Cardiovascular disease   Former tobacco abuse , quit 2016   Hypothyroidism  NKDA      Continue ASA/Statin/BB. Atenolol is at ceiling with HRs mid 50s. Sussy is agreeable to trialing addition of Imdur as second antianginal if nuclear exercise stress is unremarkable today. 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 Grzegorz Reardon at [place].  She may benefit from outpatient Holter. Further recs pending SPECT and clinical course. Thank you for allowing me to participate in the care of your patient. Please do not hesitate to call. Marisel Murillo D.O., [facility]  Interventional Cardiology     o: [phone]  [address] [facility] [address], [address] [facility]        NOTE:  This report was transcribed using voice recognition software. Every effort was made to ensure accuracy; however, inadvertent computerized transcription errors may be present.

## 2018-09-22 NOTE — PROGRESS NOTES
Patient admitted to room 206.1 from ED. Patient oriented to room, call light, bed rails, phone, lights and bathroom. Patient instructed about the schedule of the day including: vital sign frequency, lab draws, possible tests, frequency of MD and staff rounds, including RN/MD rounding together at bedside, daily weights, and I &O's. Patient instructed about prescribed diet, how to use 8MENU, and television. Telemetry box  in place, patient aware of placement and reason. Bed locked, in lowest position, side rails up 2/4, call light within reach. Will continue to monitor.

## 2018-09-22 NOTE — ED PROVIDER NOTES
Normocephalic, atraumatic  EYES: Sclera anicteric.no conjunctival injection,   ENT: Mucous membranes moist, no nasal discharge, no stridor,  NECK: Supple, no meningismus, no adenopathy, trachea midline, thyroid normal  HEART: RRR without rubs murmurs or gallops  PULSES: 2+ and equal carotid, radial, femoral, dorsalis pedis pulses  CHEST: No chest wall pain to palpation  LUNGS:  Clear good air movement no wheezing no retraction or accessory muscle use,  ABDOMEN: Soft, non-tender to palpation, no guarding or rebound. , no mass or distention and no hepatosplenomegaly. Clearly no evidence of an acute abdomen or peritoneal signs at time of exam  BACK: No pain to palpation of the midline   EXTREMITIES: No acute deformities, no peripheral edema, no cord no erythema and no unilateral leg swelling  SKIN: Warm and dry.  Normal color, no rash,  capillary refill less than 2 seconds  MENTAL STATUS: Alert, oriented, interactive,   NEUROLOGICAL:  No facial drooping. moves all 4 extremities, sensation intact, no focal findings     Nursing note and vital signs reviewed     I have reviewed and interpreted all of the currently available lab results from this visit (if applicable):  Results for orders placed or performed during the hospital encounter of 09/22/18   CBC Auto Differential   Result Value Ref Range    WBC 12.1 (H) 4.0 - 11.0 K/uL    RBC 4.62 4.00 - 5.20 M/uL    Hemoglobin 12.4 12.0 - 16.0 g/dL    Hematocrit 38.1 36.0 - 48.0 %    MCV 82.5 80.0 - 100.0 fL    MCH 26.9 26.0 - 34.0 pg    MCHC 32.6 31.0 - 36.0 g/dL    RDW 14.3 12.4 - 15.4 %    Platelets 938 054 - 506 K/uL    MPV 9.2 5.0 - 10.5 fL    Neutrophils % 69.2 %    Lymphocytes % 20.1 %    Monocytes % 7.0 %    Eosinophils % 3.2 %    Basophils % 0.5 %    Neutrophils # 8.3 (H) 1.7 - 7.7 K/uL    Lymphocytes # 2.4 1.0 - 5.1 K/uL    Monocytes # 0.8 0.0 - 1.3 K/uL    Eosinophils # 0.4 0.0 - 0.6 K/uL    Basophils # 0.1 0.0 - 0.2 K/uL   Troponin   Result Value Ref Range    Troponin <0.01 <0.01 ng/mL   Comprehensive Metabolic Panel   Result Value Ref Range    Sodium 137 136 - 145 mmol/L    Potassium 4.0 3.5 - 5.1 mmol/L    Chloride 102 99 - 110 mmol/L    CO2 22 21 - 32 mmol/L    Anion Gap 13 3 - 16    Glucose 103 (H) 70 - 99 mg/dL    BUN 17 7 - 20 mg/dL    CREATININE 0.6 0.6 - 1.1 mg/dL    GFR Non-African American >60 >60    GFR African American >60 >60    Calcium 9.3 8.3 - 10.6 mg/dL    Total Protein 7.1 6.4 - 8.2 g/dL    Alb 3.8 3.4 - 5.0 g/dL    Albumin/Globulin Ratio 1.2 1.1 - 2.2    Total Bilirubin <0.2 0.0 - 1.0 mg/dL    Alkaline Phosphatase 63 40 - 129 U/L    ALT 15 10 - 40 U/L    AST 14 (L) 15 - 37 U/L    Globulin 3.3 g/dL   HCG Qualitative, Serum   Result Value Ref Range    hCG Qual Negative Detects HCG level >10 MIU/mL        Radiographs (if obtained):  [] Radiologist's Report Reviewed:  XR CHEST STANDARD (2 VW)   Final Result   No acute findings. CTA CHEST W CONTRAST    (Results Pending)       [] Discussed with Radiologist:     [] The following radiograph was interpreted by myself in the absence of a radiologist:     EKG (if obtained): (All EKG's are interpreted by myself in the absence of a cardiologist)  EKG demonstrates a normal sinus rhythm with normal axis normal intervals normal ST-T wave segments and no evidence of ischemia infarction or arrhythmia heart rate of 88 and much like her previous EKG    MDM:   Patient with acute substernal chest pain that radiates to her back and short of breath. Symptoms very similar to her previous MI. Patient is now pain-free in the emergency department her EKG did not show any acute changes chest x-ray was normal.  Pulses were normal as is her blood pressure. I was concerned about the possibility of a thoracic aortic dissection which I am not seeing to my evaluation but radiology hasn't weight and as yet.   I will arrange to get her admitted regardless of these results to rule out ACS and if there is a dissection we will involve vascular surgery. Final Impression:  1. Chest pain, unspecified type        Critical Care:     Patient had a critical-care time of 48 minutes including time to bedside time evaluating lab studies and in discussion with consultants. This does not include any billable procedures. Concern for this patient has both ACS and aortic disease and the treatment was nitroglycerin and aspirin and admission  Disposition referral (if applicable):  No follow-up provider specified. Disposition medications (if applicable):  New Prescriptions    No medications on file       Comment: Please note this report has been produced using speech recognition software and may contain errors related to that system including errors in grammar, punctuation, and spelling, as well as words and phrases that may be inappropriate. If there are any questions or concerns please feel free to contact the dictating provider for clarification.       (Please note that portions of this note may have been completed with a voice recognition program. Efforts were made to edit the dictations but occasionally words are mis-transcribed.)    MD Saeed Carbajal., MD  09/22/18 8165

## 2018-09-22 NOTE — PLAN OF CARE
Problem: Safety:  Goal: Free from accidental physical injury  Free from accidental physical injury   Outcome: Ongoing  Pt low fall risk. Call light within reach. Bed in low position. Non slip socks in place. Will continue to monitor.

## 2018-09-22 NOTE — H&P
Hospital Medicine History & Physical      PCP: Jaime Coleman DO    Date of Admission: 9/22/2018    Date of Service: Pt seen/examined on 9/22/18 and Placed in Observation. Chief Complaint:  Chest pain/back pain      History Of Present Illness:   38 y.o. female with CAD(hx of stent) who presented to [facility] with upper back pain/chest pain. Pt noted symptoms around 9am yesterday while sitting at her desk at work and was initially upper back pain, which then progressed into chest discomfort,all of which was intermittent and 7/10 severity at its worst. She took 2 nitro with slight relief. No arm/jaw/neck pain and no assoc sob/n/v/diaphoresis. She has no hx of reflux and notes that this was similar to how to presented for her prior MI. Past Medical History:          Diagnosis Date    CAD (coronary artery disease)     GERD (gastroesophageal reflux disease) 6/6/2018    Heart attack ([address] 11/19/2016    Hyperlipidemia 12/29/2016    Hypertension     Obesity (BMI 30-39. 9) 8/12/2017       Past Surgical History:          Procedure Laterality Date    APPENDECTOMY      CORONARY ANGIOPLASTY WITH STENT PLACEMENT      KNEE ARTHROSCOPY      LEG SURGERY      NECK SURGERY      SPINE SURGERY      neck    TONSILLECTOMY         Medications Prior to Admission:      Prior to Admission medications    Medication Sig Start Date End Date Taking? Authorizing Provider   ferrous sulfate 325 (65 Fe) MG tablet Take 325 mg by mouth daily (with breakfast)   Yes Historical Provider, MD   levothyroxine (SYNTHROID) 75 MCG tablet Take 1 tablet by mouth Daily 6/6/18  Yes Sangeeta Kerns MD   atorvastatin (LIPITOR) 80 MG tablet Take by mouth daily    Yes Historical Provider, MD   aspirin 81 MG tablet Take 81 mg by mouth daily   Yes Historical Provider, MD   atenolol (TENORMIN) 25 MG tablet Take 25 mg by mouth daily.      Yes Historical Provider, MD   vitamin D3 (CHOLECALCIFEROL) 400 units TABS tablet Take 2 tablets by range of motion without deformity. Skin: Skin color, texture, turgor normal.  No rashes or lesions. Neurologic:  Neurovascularly intact without any focal sensory/motor deficits. Cranial nerves: II-XII intact, grossly non-focal.  Psychiatric:  Alert and oriented, thought content appropriate, normal insight  Capillary Refill: Brisk,< 3 seconds   Peripheral Pulses: +2 palpable, equal bilaterally       Labs:     Recent Labs      09/22/18 0328   WBC  12.1*   HGB  12.4   HCT  38.1   PLT  284     Recent Labs      09/22/18 0328   NA  137   K  4.0   CL  102   CO2  22   BUN  17   CREATININE  0.6   CALCIUM  9.3     Recent Labs      09/22/18 0328   AST  14*   ALT  15   BILITOT  <0.2   ALKPHOS  63     No results for input(s): INR in the last 72 hours. Recent Labs      09/22/18 0328   TROPONINI  <0.01       Urinalysis:      Lab Results   Component Value Date    NITRU Negative 08/12/2017    WBCUA 3-5 08/26/2014    BACTERIA 1+ 08/26/2014    RBCUA 20-50 08/26/2014    BLOODU Negative 08/12/2017    SPECGRAV >=1.030 08/12/2017    GLUCOSEU Negative 08/12/2017       Radiology:     EKG:  I have reviewed the EKG with the following interpretation: sinus, rate of 88, nl axis, no st/tw changes to indicate ischemia appreciated. CTA CHEST W CONTRAST   Final Result   No evidence of an acute aortic syndrome. Negative for acute pulmonary embolism. Mild mediastinal and hilar lymphadenopathy is indeterminate though presumably   reactive. XR CHEST STANDARD (2 VW)   Final Result   No acute findings. ASSESSMENT:    Active Hospital Problems    Diagnosis Date Noted    GERD (gastroesophageal reflux disease) [K21.9] 06/06/2018    ABHILASH on CPAP [G47.33, Z99.89] 06/06/2018    Obesity (BMI 30-39. 9) [E66.9] 08/12/2017    Former smoker [Z87.891] 08/12/2017    Chronic fatigue [R53.83] 08/12/2017    Chronic mediastinal lymphadenopathy (noted Nov 2016) [R59.0] 12/29/2016    Hyperlipidemia [E78.5] 12/29/2016    CAD

## 2018-09-23 NOTE — DISCHARGE SUMMARY
129/80   Pulse 66   Temp 98.2 °F (36.8 °C) (Oral)   Resp 18   Ht 5' 6\" (1.676 m)   Wt 225 lb 9.6 oz (102.3 kg)   LMP 09/08/2018   SpO2 100%   BMI 36.41 kg/m²       General appearance:  No apparent distress, appears stated age and cooperative. HEENT:  Normal cephalic, atraumatic without obvious deformity. Pupils equal, round, and reactive to light. Extra ocular muscles intact. Conjunctivae/corneas clear. Neck: Supple, with full range of motion. No jugular venous distention. Trachea midline. Respiratory:  Normal respiratory effort. Clear to auscultation, bilaterally without Rales/Wheezes/Rhonchi. Cardiovascular:  Regular rate and rhythm with normal S1/S2 without murmurs, rubs or gallops. Abdomen: Soft, non-tender, non-distended with normal bowel sounds. Musculoskeletal:  No clubbing, cyanosis or edema bilaterally. Full range of motion without deformity. Skin: Skin color, texture, turgor normal.  No rashes or lesions. Neurologic:  Neurovascularly intact without any focal sensory/motor deficits. Cranial nerves: II-XII intact, grossly non-focal.  Psychiatric:  Alert and oriented, thought content appropriate, normal insight  Capillary Refill: Brisk,< 3 seconds   Peripheral Pulses: +2 palpable, equal bilaterally       Labs: For convenience and continuity at follow-up the following most recent labs are provided:      CBC:    Lab Results   Component Value Date    WBC 12.1 09/22/2018    HGB 12.4 09/22/2018    HCT 38.1 09/22/2018     09/22/2018       Renal:    Lab Results   Component Value Date     09/22/2018    K 4.0 09/22/2018     09/22/2018    CO2 22 09/22/2018    BUN 17 09/22/2018    CREATININE 0.6 09/22/2018    CALCIUM 9.3 09/22/2018    PHOS 4.0 08/14/2017         Significant Diagnostic Studies    Radiology:   Stress/Rest NM Myocardial SPECT RX   Final Result      CTA CHEST W CONTRAST   Final Result   No evidence of an acute aortic syndrome. Negative for acute pulmonary embolism. Mild mediastinal and hilar lymphadenopathy is indeterminate though presumably   reactive. XR CHEST STANDARD (2 VW)   Final Result   No acute findings. Consults:     IP CONSULT TO HOSPITALIST  IP CONSULT TO DIETITIAN  IP CONSULT TO CARDIOLOGY    Disposition:  home     Condition at Discharge: Stable    Discharge Instructions/Follow-up:  Primary cards as outpt    Code Status:  FULL    Activity: activity as tolerated    Diet: cardiac diet      Discharge Medications:     Discharge Medication List as of 9/22/2018  6:15 PM           Details   isosorbide mononitrate (IMDUR) 30 MG extended release tablet Take 1 tablet by mouth daily, Disp-30 tablet, R-1Print              Details   ferrous sulfate 325 (65 Fe) MG tablet Take 325 mg by mouth daily (with breakfast)Historical Med      levothyroxine (SYNTHROID) 75 MCG tablet Take 1 tablet by mouth Daily, Disp-30 tablet, R-3Normal      vitamin D3 (CHOLECALCIFEROL) 400 units TABS tablet Take 2 tablets by mouth daily, Disp-30 tablet, R-0Normal      Ascorbic Acid (VITAMIN C) 500 MG tablet Take 500 mg by mouth daily      atorvastatin (LIPITOR) 80 MG tablet Take by mouth daily Historical Med      aspirin 81 MG tablet Take 81 mg by mouth daily      atenolol (TENORMIN) 25 MG tablet Take 25 mg by mouth daily. therapeutic multivitamin-minerals (THERAGRAN-M) tablet Take 1 tablet by mouth daily. Time Spent on discharge is more than 20 minutes in the examination, evaluation, counseling and review of medications and discharge plan. Signed:    Sendy Cortez MD   9/23/2018      Thank you Nakita Mendoza DO for the opportunity to be involved in this patient's care. If you have any questions or concerns please feel free to contact me at [phone].

## 2018-09-26 LAB
EKG ATRIAL RATE: 88 BPM
EKG DIAGNOSIS: NORMAL
EKG P AXIS: 57 DEGREES
EKG P-R INTERVAL: 128 MS
EKG Q-T INTERVAL: 334 MS
EKG QRS DURATION: 84 MS
EKG QTC CALCULATION (BAZETT): 404 MS
EKG R AXIS: 38 DEGREES
EKG T AXIS: 55 DEGREES
EKG VENTRICULAR RATE: 88 BPM

## 2018-10-17 ENCOUNTER — HOSPITAL ENCOUNTER (EMERGENCY)
Age: 43
Discharge: HOME | End: 2018-10-17
Payer: COMMERCIAL

## 2018-10-17 ENCOUNTER — APPOINTMENT (OUTPATIENT)
Dept: GENERAL RADIOLOGY | Age: 43
End: 2018-10-17
Payer: COMMERCIAL

## 2018-10-17 VITALS
SYSTOLIC BLOOD PRESSURE: 149 MMHG | OXYGEN SATURATION: 99 % | RESPIRATION RATE: 18 BRPM | DIASTOLIC BLOOD PRESSURE: 79 MMHG | HEIGHT: 66 IN | HEART RATE: 74 BPM | TEMPERATURE: 98.5 F | WEIGHT: 210 LBS | BODY MASS INDEX: 33.75 KG/M2

## 2018-10-17 DIAGNOSIS — M17.0 OSTEOARTHRITIS OF BOTH KNEES, UNSPECIFIED OSTEOARTHRITIS TYPE: Primary | ICD-10-CM

## 2018-10-17 DIAGNOSIS — S80.00XA CONTUSION OF KNEE, UNSPECIFIED LATERALITY, INITIAL ENCOUNTER: ICD-10-CM

## 2018-10-17 PROCEDURE — 6370000000 HC RX 637 (ALT 250 FOR IP): Performed by: NURSE PRACTITIONER

## 2018-10-17 PROCEDURE — 73560 X-RAY EXAM OF KNEE 1 OR 2: CPT

## 2018-10-17 PROCEDURE — 99283 EMERGENCY DEPT VISIT LOW MDM: CPT

## 2018-10-17 RX ORDER — IBUPROFEN 800 MG/1
800 TABLET ORAL EVERY 8 HOURS PRN
Qty: 20 TABLET | Refills: 0 | Status: SHIPPED | OUTPATIENT
Start: 2018-10-17

## 2018-10-17 RX ORDER — LIDOCAINE 50 MG/G
2 PATCH TOPICAL DAILY
Status: DISCONTINUED | OUTPATIENT
Start: 2018-10-17 | End: 2018-10-17 | Stop reason: HOSPADM

## 2018-10-17 RX ORDER — TRAMADOL HYDROCHLORIDE 50 MG/1
50 TABLET ORAL EVERY 6 HOURS PRN
Qty: 20 TABLET | Refills: 0 | Status: SHIPPED | OUTPATIENT
Start: 2018-10-17 | End: 2018-10-27

## 2018-10-17 RX ORDER — IBUPROFEN 800 MG/1
800 TABLET ORAL ONCE
Status: COMPLETED | OUTPATIENT
Start: 2018-10-17 | End: 2018-10-17

## 2018-10-17 RX ORDER — LIDOCAINE 50 MG/G
1 PATCH TOPICAL DAILY
Qty: 30 PATCH | Refills: 0 | Status: ON HOLD | OUTPATIENT
Start: 2018-10-17 | End: 2019-03-06

## 2018-10-17 RX ADMIN — IBUPROFEN 800 MG: 800 TABLET, FILM COATED ORAL at 16:34

## 2018-10-17 ASSESSMENT — PAIN DESCRIPTION - ORIENTATION: ORIENTATION: RIGHT;LEFT

## 2018-10-17 ASSESSMENT — ENCOUNTER SYMPTOMS
ABDOMINAL PAIN: 0
DIARRHEA: 0
CHEST TIGHTNESS: 0
SHORTNESS OF BREATH: 0
NAUSEA: 0
VOMITING: 0

## 2018-10-17 ASSESSMENT — PAIN DESCRIPTION - LOCATION: LOCATION: KNEE

## 2018-10-17 ASSESSMENT — PAIN SCALES - GENERAL: PAINLEVEL_OUTOF10: 9

## 2018-10-17 ASSESSMENT — PAIN DESCRIPTION - PAIN TYPE: TYPE: ACUTE PAIN;CHRONIC PAIN

## 2018-10-17 NOTE — ED PROVIDER NOTES
patella. No destructive osseous lesion is seen. No soft tissue abnormality or radiopaque foreign body is identified. 1. No acute abnormality of either knee. 2. Mild tricompartmental osteoarthritis involving both knees. Xr Knee Right (1-2 Views)    Result Date: 10/17/2018  EXAMINATION: TWO XRAY VIEWS OF THE LEFT KNEE; TWO XRAY VIEWS OF THE RIGHT KNEE 10/17/2018 3:50 pm COMPARISON: None. HISTORY: ORDERING SYSTEM PROVIDED HISTORY: fall TECHNOLOGIST PROVIDED HISTORY: Reason for exam:->fall Acuity: Unknown Type of Exam: Unknown Acute bilateral knee pain. FINDINGS: Frontal and lateral views of the right knee were performed. There is no acute fracture or dislocation. A joint effusion is not identified. There is mild joint space loss and osteophyte formation within all three compartments, most notably involving the medial compartment. There are small enthesophytes at the superior and inferior poles of the patella. No destructive osseous lesion is seen. There is orthopedic hardware stabilizing the mid right fibula. No soft tissue abnormality or radiopaque foreign body is evident. Frontal and lateral views of the left knee were performed. There is no acute fracture or dislocation. A joint effusion is not identified. There is mild joint space loss and osteophyte formation within all three compartments, most notably involving the medial compartment. There are enthesophytes at the superior and inferior poles of the patella. No destructive osseous lesion is seen. No soft tissue abnormality or radiopaque foreign body is identified. 1. No acute abnormality of either knee. 2. Mild tricompartmental osteoarthritis involving both knees.          PROCEDURES   Unless otherwise noted below, none     Procedures    CRITICAL CARE TIME   N/A    CONSULTS:  None      EMERGENCY DEPARTMENT COURSE and DIFFERENTIALDIAGNOSIS/MDM:   Vitals:    Vitals:    10/17/18 1544   BP: (!) 149/79   Pulse: 74   Resp: 18   Temp: 98.5 °F (36.9 °C)   TempSrc: Oral   SpO2: 99%   Weight: 210 lb (95.3 kg)   Height: 5' 6\" (1.676 m)       Patient was given thefollowing medications:  Medications   lidocaine (LIDODERM) 5 % 2 patch (2 patches Transdermal Patch Applied 10/17/18 1634)   ibuprofen (ADVIL;MOTRIN) tablet 800 mg (800 mg Oral Given 10/17/18 1634)       Briefly, this is an otherwise healthy 25-year-old female presents to the emergency department with complaint of bilateral knee pain after accidentally falling 10 days ago. X-rays of bilateral knees:  Impression:    1. No acute abnormality of either knee. 2. Mild tricompartmental osteoarthritis involving both knees. Ace wrap applied. Lidoderm patches are applied. The patient is given Ultram and ibuprofen. Instructed to follow-up with orthopedics, referral given. Ice rest and elevation. I estimate there is LOW risk for FRACTURE, COMPARTMENT SYNDROME, DEEP VENOUS THROMBOSIS, SEPTIC ARTHRITIS, TENDON OR NEUROVASCULAR INJURY, thus I consider the discharge disposition reasonable. The patient tolerated their visit well. They were seen and evaluated by the attending physician who agreed with the assessment and plan. The patient and / or thefamily were informed of the results of any tests, a time was given to answer questions, a plan was proposed and they agreed with plan. FINAL IMPRESSION      1. Osteoarthritis of both knees, unspecified osteoarthritis type    2.  Contusion of knee, unspecified laterality, initial encounter          DISPOSITION/PLAN   DISPOSITION Decision To Discharge 10/17/2018 04:26:14 PM      PATIENT REFERREDTO:  Omari Stahl MD  [address] [facility]  [address] [place] [address]  [phone]    Schedule an appointment as soon as possible for a visit   knee arthritis    [facility] Emergency Department  [address] [facility]  [phone]    If symptoms worsen      DISCHARGE MEDICATIONS:  Discharge Medication List as of

## 2018-10-31 ENCOUNTER — OFFICE VISIT (OUTPATIENT)
Dept: FAMILY MEDICINE CLINIC | Age: 43
End: 2018-10-31
Payer: COMMERCIAL

## 2018-10-31 VITALS
BODY MASS INDEX: 36.8 KG/M2 | HEIGHT: 66 IN | SYSTOLIC BLOOD PRESSURE: 132 MMHG | HEART RATE: 70 BPM | DIASTOLIC BLOOD PRESSURE: 80 MMHG | WEIGHT: 229 LBS | OXYGEN SATURATION: 97 %

## 2018-10-31 DIAGNOSIS — Z23 IMMUNIZATION DUE: ICD-10-CM

## 2018-10-31 DIAGNOSIS — N39.0 URINARY TRACT INFECTION WITHOUT HEMATURIA, SITE UNSPECIFIED: Primary | ICD-10-CM

## 2018-10-31 LAB
BILIRUBIN, POC: NORMAL
BLOOD URINE, POC: NORMAL
CLARITY, POC: NORMAL
COLOR, POC: NORMAL
GLUCOSE URINE, POC: NORMAL
KETONES, POC: NORMAL
LEUKOCYTE EST, POC: NORMAL
NITRITE, POC: NORMAL
PH, POC: 8
PROTEIN, POC: 100
SPECIFIC GRAVITY, POC: 1.02
UROBILINOGEN, POC: 0.2

## 2018-10-31 PROCEDURE — 81002 URINALYSIS NONAUTO W/O SCOPE: CPT | Performed by: NURSE PRACTITIONER

## 2018-10-31 PROCEDURE — 99213 OFFICE O/P EST LOW 20 MIN: CPT | Performed by: NURSE PRACTITIONER

## 2018-10-31 PROCEDURE — 90471 IMMUNIZATION ADMIN: CPT | Performed by: NURSE PRACTITIONER

## 2018-10-31 PROCEDURE — 90688 IIV4 VACCINE SPLT 0.5 ML IM: CPT | Performed by: NURSE PRACTITIONER

## 2018-10-31 RX ORDER — PHENAZOPYRIDINE HYDROCHLORIDE 100 MG/1
100 TABLET, FILM COATED ORAL 3 TIMES DAILY PRN
Qty: 9 TABLET | Refills: 0 | Status: SHIPPED | OUTPATIENT
Start: 2018-10-31 | End: 2018-11-03

## 2018-10-31 RX ORDER — TRAMADOL HYDROCHLORIDE 50 MG/1
50 TABLET ORAL EVERY 6 HOURS PRN
COMMUNITY
End: 2020-02-28 | Stop reason: ALTCHOICE

## 2018-10-31 RX ORDER — SULFAMETHOXAZOLE AND TRIMETHOPRIM 800; 160 MG/1; MG/1
1 TABLET ORAL 2 TIMES DAILY
Qty: 14 TABLET | Refills: 0 | Status: SHIPPED | OUTPATIENT
Start: 2018-10-31 | End: 2018-11-07

## 2018-10-31 ASSESSMENT — PATIENT HEALTH QUESTIONNAIRE - PHQ9
2. FEELING DOWN, DEPRESSED OR HOPELESS: 0
1. LITTLE INTEREST OR PLEASURE IN DOING THINGS: 0
SUM OF ALL RESPONSES TO PHQ9 QUESTIONS 1 & 2: 0
SUM OF ALL RESPONSES TO PHQ QUESTIONS 1-9: 0
SUM OF ALL RESPONSES TO PHQ QUESTIONS 1-9: 0

## 2018-10-31 ASSESSMENT — ENCOUNTER SYMPTOMS
NAUSEA: 0
BACK PAIN: 0
COUGH: 0
SHORTNESS OF BREATH: 0
DIARRHEA: 0
VOMITING: 0

## 2018-11-01 ENCOUNTER — OFFICE VISIT (OUTPATIENT)
Dept: ORTHOPEDIC SURGERY | Age: 43
End: 2018-11-01
Payer: COMMERCIAL

## 2018-11-01 VITALS — BODY MASS INDEX: 36.16 KG/M2 | WEIGHT: 225 LBS | HEIGHT: 66 IN

## 2018-11-01 DIAGNOSIS — S76.312A STRAIN OF LEFT HAMSTRING, INITIAL ENCOUNTER: ICD-10-CM

## 2018-11-01 DIAGNOSIS — S80.01XA CONTUSION OF RIGHT KNEE, INITIAL ENCOUNTER: Primary | ICD-10-CM

## 2018-11-01 PROCEDURE — 99203 OFFICE O/P NEW LOW 30 MIN: CPT | Performed by: ORTHOPAEDIC SURGERY

## 2018-11-01 RX ORDER — METHYLPREDNISOLONE 4 MG/1
TABLET ORAL
Qty: 1 KIT | Refills: 0 | Status: SHIPPED | OUTPATIENT
Start: 2018-11-01 | End: 2019-02-27 | Stop reason: ALTCHOICE

## 2018-11-01 NOTE — PROGRESS NOTES
I am evaluating this patient as a consult at the request of workman's comp      Chief Complaint:  Knee Pain (WC BILATERAL KNEE. ALBERT: FELL DOWN BUS STAIRS DOI: 10/5 L KNEE WORSE THEN R KNEE)      History of Present Illness:  Donal Johnson is a 38 y.o. female here regarding bilateral knee injuries, she is a dispatcher, currently in management, was exiting a bus on October 5 when she slipped and fell directly onto her right knee and forcefully ended her left knee pulling the acute part of her thigh. She did not see improvement of symptoms over the course of a few days and presented to the emergency room where x-rays were obtained. She continues to have 4 out of 10 discomfort on the posterior aspect of the left knee and thigh, this is worse after sitting for long periods of time and walking. She has 2 out of 10 anterior pain on the knee and states this continues to improve. She denies catching, locking or instability. Pain Assessment:  Pain Assessment  Location of Pain: Knee  Location Modifiers: Right  Severity of Pain: 4  Result of Injury: Yes  Work-Related Injury: Yes  Are there other pain locations you wish to document?: No    Medical History:  Past Medical History:   Diagnosis Date    CAD (coronary artery disease)     GERD (gastroesophageal reflux disease) 6/6/2018    Heart attack ([facility] [address] 11/19/2016    Hyperlipidemia 12/29/2016    Hypertension     Obesity (BMI 30-39. 9) 8/12/2017     Past Surgical History:   Procedure Laterality Date    APPENDECTOMY      CORONARY ANGIOPLASTY WITH STENT PLACEMENT      KNEE ARTHROSCOPY      LEG SURGERY      NECK SURGERY      SPINE SURGERY      neck    TONSILLECTOMY       Social History     Social History    Marital status:      Spouse name: N/A    Number of children: N/A    Years of education: N/A     Social History Main Topics    Smoking status: Former Smoker     Packs/day: 0.50     Years: 10.00     Types: Cigarettes     Quit date: 11/2016   [place] Smokeless tobacco: Never Used    Alcohol use Yes      Comment: occasional    Drug use: No    Sexual activity: Yes     Partners: Male     Other Topics Concern    None     Social History Narrative    ** Merged History Encounter **          No Known Allergies    Review of Systems:  Constitutional: negative  Respiratory: negative  Cardiovascular: negative  Musculoskeletal:negative except for Knee Pain (WC BILATERAL KNEE. ALBERT: FELL DOWN BUS STAIRS DOI: 10/5 L KNEE WORSE THEN R KNEE)    Relevant review of systems reviewed and available in the patient's chart in media tab    Vital Signs:  Vitals:    11/01/18 1520   Weight: 225 lb (102.1 kg)   Height: 5' 6\" (1.676 m)         General Exam:   Constitutional: Patient is adequately groomed with no evidence of malnutrition  Mental Status: The patient is oriented to time, place and person. The patient's mood and affect are appropriate. Vascular: Examination reveals no swelling or calf tenderness. Peripheral pulses are palpable and 2+. left Knee Examination  Inspection:   No gross deformities noted. no swelling noted. No erythema or ecchymosis. Skin is intact. Palpation: negative Tenderness to palpation along the medial joint line, negative Tenderness to palpation along lateral joint line, negative Tenderness to palpation along medial and lateral facets of undersurface of the patella  She has significant tenderness to palpation to the distal hamstring tendons. She is very tight hamstrings and is unable to fully extend her leg with hip flexion. She is tearful during exam when I try to stretch out her hamstrings  Range of Motion:  0-120, although she does have discomfort with full extension    Strength:  Able to do SLR    Special Tests:  ACL, MCL, PCL, LCL are intact with stress exam.  There negative crepitus noted with range of motion under the patella. Skin: There are no rashes, ulcerations or lesions.     Gait: Antalgic    Reflex: not tested    Additional

## 2018-11-02 LAB — URINE CULTURE, ROUTINE: NORMAL

## 2018-11-12 ENCOUNTER — CARE COORDINATION (OUTPATIENT)
Dept: FAMILY MEDICINE CLINIC | Age: 43
End: 2018-11-12

## 2018-11-13 RX ORDER — LEVOTHYROXINE SODIUM 0.07 MG/1
75 TABLET ORAL DAILY
Qty: 30 TABLET | Refills: 8 | Status: SHIPPED | OUTPATIENT
Start: 2018-11-13 | End: 2019-11-14 | Stop reason: SDUPTHER

## 2018-11-20 ENCOUNTER — CARE COORDINATION (OUTPATIENT)
Dept: FAMILY MEDICINE CLINIC | Age: 43
End: 2018-11-20

## 2018-12-04 ENCOUNTER — CARE COORDINATION (OUTPATIENT)
Dept: CARE COORDINATION | Age: 43
End: 2018-12-04

## 2019-01-17 ENCOUNTER — OFFICE VISIT (OUTPATIENT)
Dept: ORTHOPEDIC SURGERY | Age: 44
End: 2019-01-17
Payer: COMMERCIAL

## 2019-01-17 VITALS — HEIGHT: 66 IN | BODY MASS INDEX: 32.14 KG/M2 | WEIGHT: 200 LBS

## 2019-01-17 DIAGNOSIS — S76.312A STRAIN OF LEFT HAMSTRING, INITIAL ENCOUNTER: ICD-10-CM

## 2019-01-17 DIAGNOSIS — S83.242A ACUTE MEDIAL MENISCUS TEAR OF LEFT KNEE, INITIAL ENCOUNTER: ICD-10-CM

## 2019-01-17 DIAGNOSIS — M23.42 LOOSE BODY IN KNEE, LEFT KNEE: Primary | ICD-10-CM

## 2019-01-17 PROCEDURE — 99214 OFFICE O/P EST MOD 30 MIN: CPT | Performed by: ORTHOPAEDIC SURGERY

## 2019-01-28 ENCOUNTER — TELEPHONE (OUTPATIENT)
Dept: PULMONOLOGY | Age: 44
End: 2019-01-28

## 2019-02-27 ENCOUNTER — TELEPHONE (OUTPATIENT)
Dept: ORTHOPEDIC SURGERY | Age: 44
End: 2019-02-27

## 2019-03-04 ENCOUNTER — ANESTHESIA EVENT (OUTPATIENT)
Dept: OPERATING ROOM | Age: 44
End: 2019-03-04
Payer: COMMERCIAL

## 2019-03-06 ENCOUNTER — HOSPITAL ENCOUNTER (OUTPATIENT)
Age: 44
Setting detail: OUTPATIENT SURGERY
Discharge: HOME | End: 2019-03-06
Attending: ORTHOPAEDIC SURGERY | Admitting: ORTHOPAEDIC SURGERY
Payer: COMMERCIAL

## 2019-03-06 ENCOUNTER — ANESTHESIA (OUTPATIENT)
Dept: OPERATING ROOM | Age: 44
End: 2019-03-06
Payer: COMMERCIAL

## 2019-03-06 VITALS
HEART RATE: 75 BPM | WEIGHT: 224 LBS | DIASTOLIC BLOOD PRESSURE: 88 MMHG | SYSTOLIC BLOOD PRESSURE: 141 MMHG | BODY MASS INDEX: 36 KG/M2 | TEMPERATURE: 98 F | OXYGEN SATURATION: 98 % | HEIGHT: 66 IN | RESPIRATION RATE: 16 BRPM

## 2019-03-06 VITALS
RESPIRATION RATE: 4 BRPM | DIASTOLIC BLOOD PRESSURE: 83 MMHG | SYSTOLIC BLOOD PRESSURE: 135 MMHG | OXYGEN SATURATION: 99 %

## 2019-03-06 DIAGNOSIS — S83.242A ACUTE MEDIAL MENISCUS TEAR OF LEFT KNEE, INITIAL ENCOUNTER: Primary | ICD-10-CM

## 2019-03-06 LAB — PREGNANCY, URINE: NEGATIVE

## 2019-03-06 PROCEDURE — 6360000002 HC RX W HCPCS: Performed by: ORTHOPAEDIC SURGERY

## 2019-03-06 PROCEDURE — 3700000001 HC ADD 15 MINUTES (ANESTHESIA): Performed by: ORTHOPAEDIC SURGERY

## 2019-03-06 PROCEDURE — 6370000000 HC RX 637 (ALT 250 FOR IP)

## 2019-03-06 PROCEDURE — 7100000010 HC PHASE II RECOVERY - FIRST 15 MIN: Performed by: ORTHOPAEDIC SURGERY

## 2019-03-06 PROCEDURE — 6360000002 HC RX W HCPCS: Performed by: NURSE ANESTHETIST, CERTIFIED REGISTERED

## 2019-03-06 PROCEDURE — 6360000002 HC RX W HCPCS: Performed by: ANESTHESIOLOGY

## 2019-03-06 PROCEDURE — 3600000014 HC SURGERY LEVEL 4 ADDTL 15MIN: Performed by: ORTHOPAEDIC SURGERY

## 2019-03-06 PROCEDURE — 7100000001 HC PACU RECOVERY - ADDTL 15 MIN: Performed by: ORTHOPAEDIC SURGERY

## 2019-03-06 PROCEDURE — 7100000011 HC PHASE II RECOVERY - ADDTL 15 MIN: Performed by: ORTHOPAEDIC SURGERY

## 2019-03-06 PROCEDURE — 7100000000 HC PACU RECOVERY - FIRST 15 MIN: Performed by: ORTHOPAEDIC SURGERY

## 2019-03-06 PROCEDURE — 2500000003 HC RX 250 WO HCPCS: Performed by: ANESTHESIOLOGY

## 2019-03-06 PROCEDURE — 2500000003 HC RX 250 WO HCPCS: Performed by: NURSE ANESTHETIST, CERTIFIED REGISTERED

## 2019-03-06 PROCEDURE — 2720000010 HC SURG SUPPLY STERILE: Performed by: ORTHOPAEDIC SURGERY

## 2019-03-06 PROCEDURE — 3700000000 HC ANESTHESIA ATTENDED CARE: Performed by: ORTHOPAEDIC SURGERY

## 2019-03-06 PROCEDURE — 84703 CHORIONIC GONADOTROPIN ASSAY: CPT

## 2019-03-06 PROCEDURE — 2709999900 HC NON-CHARGEABLE SUPPLY: Performed by: ORTHOPAEDIC SURGERY

## 2019-03-06 PROCEDURE — 2580000003 HC RX 258: Performed by: ANESTHESIOLOGY

## 2019-03-06 PROCEDURE — 2500000003 HC RX 250 WO HCPCS: Performed by: ORTHOPAEDIC SURGERY

## 2019-03-06 PROCEDURE — 3600000004 HC SURGERY LEVEL 4 BASE: Performed by: ORTHOPAEDIC SURGERY

## 2019-03-06 RX ORDER — METOCLOPRAMIDE HYDROCHLORIDE 5 MG/ML
10 INJECTION INTRAMUSCULAR; INTRAVENOUS
Status: DISCONTINUED | OUTPATIENT
Start: 2019-03-06 | End: 2019-03-06 | Stop reason: HOSPADM

## 2019-03-06 RX ORDER — MORPHINE SULFATE 10 MG/ML
1 INJECTION, SOLUTION INTRAMUSCULAR; INTRAVENOUS EVERY 5 MIN PRN
Status: DISCONTINUED | OUTPATIENT
Start: 2019-03-06 | End: 2019-03-06 | Stop reason: HOSPADM

## 2019-03-06 RX ORDER — PROPOFOL 10 MG/ML
INJECTION, EMULSION INTRAVENOUS PRN
Status: DISCONTINUED | OUTPATIENT
Start: 2019-03-06 | End: 2019-03-06 | Stop reason: SDUPTHER

## 2019-03-06 RX ORDER — LIDOCAINE HYDROCHLORIDE 10 MG/ML
1 INJECTION, SOLUTION EPIDURAL; INFILTRATION; INTRACAUDAL; PERINEURAL
Status: COMPLETED | OUTPATIENT
Start: 2019-03-06 | End: 2019-03-06

## 2019-03-06 RX ORDER — PROMETHAZINE HYDROCHLORIDE 25 MG/1
25 TABLET ORAL EVERY 6 HOURS PRN
Qty: 10 TABLET | Refills: 0 | Status: SHIPPED | OUTPATIENT
Start: 2019-03-06 | End: 2019-03-13

## 2019-03-06 RX ORDER — BUPIVACAINE HYDROCHLORIDE 2.5 MG/ML
INJECTION, SOLUTION INFILTRATION; PERINEURAL PRN
Status: DISCONTINUED | OUTPATIENT
Start: 2019-03-06 | End: 2019-03-06 | Stop reason: ALTCHOICE

## 2019-03-06 RX ORDER — HYDRALAZINE HYDROCHLORIDE 20 MG/ML
5 INJECTION INTRAMUSCULAR; INTRAVENOUS EVERY 10 MIN PRN
Status: DISCONTINUED | OUTPATIENT
Start: 2019-03-06 | End: 2019-03-06 | Stop reason: HOSPADM

## 2019-03-06 RX ORDER — PROMETHAZINE HYDROCHLORIDE 25 MG/ML
6.25 INJECTION, SOLUTION INTRAMUSCULAR; INTRAVENOUS
Status: DISCONTINUED | OUTPATIENT
Start: 2019-03-06 | End: 2019-03-06 | Stop reason: HOSPADM

## 2019-03-06 RX ORDER — DEXAMETHASONE SODIUM PHOSPHATE 4 MG/ML
INJECTION, SOLUTION INTRA-ARTICULAR; INTRALESIONAL; INTRAMUSCULAR; INTRAVENOUS; SOFT TISSUE PRN
Status: DISCONTINUED | OUTPATIENT
Start: 2019-03-06 | End: 2019-03-06 | Stop reason: SDUPTHER

## 2019-03-06 RX ORDER — SODIUM CHLORIDE, SODIUM LACTATE, POTASSIUM CHLORIDE, CALCIUM CHLORIDE 600; 310; 30; 20 MG/100ML; MG/100ML; MG/100ML; MG/100ML
INJECTION, SOLUTION INTRAVENOUS CONTINUOUS
Status: DISCONTINUED | OUTPATIENT
Start: 2019-03-06 | End: 2019-03-06 | Stop reason: HOSPADM

## 2019-03-06 RX ORDER — FENTANYL CITRATE 50 UG/ML
INJECTION, SOLUTION INTRAMUSCULAR; INTRAVENOUS PRN
Status: DISCONTINUED | OUTPATIENT
Start: 2019-03-06 | End: 2019-03-06 | Stop reason: SDUPTHER

## 2019-03-06 RX ORDER — DIPHENHYDRAMINE HYDROCHLORIDE 50 MG/ML
12.5 INJECTION INTRAMUSCULAR; INTRAVENOUS
Status: DISCONTINUED | OUTPATIENT
Start: 2019-03-06 | End: 2019-03-06 | Stop reason: HOSPADM

## 2019-03-06 RX ORDER — HYDROMORPHONE HCL 110MG/55ML
PATIENT CONTROLLED ANALGESIA SYRINGE INTRAVENOUS PRN
Status: DISCONTINUED | OUTPATIENT
Start: 2019-03-06 | End: 2019-03-06 | Stop reason: SDUPTHER

## 2019-03-06 RX ORDER — ONDANSETRON 4 MG/1
4 TABLET, ORALLY DISINTEGRATING ORAL ONCE
Status: COMPLETED | OUTPATIENT
Start: 2019-03-06 | End: 2019-03-06

## 2019-03-06 RX ORDER — OXYCODONE HYDROCHLORIDE 5 MG/1
5 TABLET ORAL PRN
Status: DISCONTINUED | OUTPATIENT
Start: 2019-03-06 | End: 2019-03-06 | Stop reason: HOSPADM

## 2019-03-06 RX ORDER — OXYCODONE HYDROCHLORIDE 5 MG/1
10 TABLET ORAL PRN
Status: DISCONTINUED | OUTPATIENT
Start: 2019-03-06 | End: 2019-03-06 | Stop reason: HOSPADM

## 2019-03-06 RX ORDER — SODIUM CHLORIDE 0.9 % (FLUSH) 0.9 %
10 SYRINGE (ML) INJECTION EVERY 12 HOURS SCHEDULED
Status: DISCONTINUED | OUTPATIENT
Start: 2019-03-06 | End: 2019-03-06 | Stop reason: HOSPADM

## 2019-03-06 RX ORDER — LIDOCAINE HYDROCHLORIDE 20 MG/ML
INJECTION, SOLUTION INFILTRATION; PERINEURAL PRN
Status: DISCONTINUED | OUTPATIENT
Start: 2019-03-06 | End: 2019-03-06 | Stop reason: SDUPTHER

## 2019-03-06 RX ORDER — GLYCOPYRROLATE 0.2 MG/ML
INJECTION INTRAMUSCULAR; INTRAVENOUS PRN
Status: DISCONTINUED | OUTPATIENT
Start: 2019-03-06 | End: 2019-03-06 | Stop reason: SDUPTHER

## 2019-03-06 RX ORDER — ACETAMINOPHEN 10 MG/ML
1000 INJECTION, SOLUTION INTRAVENOUS ONCE
Status: COMPLETED | OUTPATIENT
Start: 2019-03-06 | End: 2019-03-06

## 2019-03-06 RX ORDER — ONDANSETRON 2 MG/ML
INJECTION INTRAMUSCULAR; INTRAVENOUS PRN
Status: DISCONTINUED | OUTPATIENT
Start: 2019-03-06 | End: 2019-03-06 | Stop reason: SDUPTHER

## 2019-03-06 RX ORDER — ONDANSETRON 4 MG/1
TABLET, ORALLY DISINTEGRATING ORAL
Status: COMPLETED
Start: 2019-03-06 | End: 2019-03-06

## 2019-03-06 RX ORDER — LABETALOL HYDROCHLORIDE 5 MG/ML
INJECTION, SOLUTION INTRAVENOUS PRN
Status: DISCONTINUED | OUTPATIENT
Start: 2019-03-06 | End: 2019-03-06 | Stop reason: SDUPTHER

## 2019-03-06 RX ORDER — SODIUM CHLORIDE 0.9 % (FLUSH) 0.9 %
10 SYRINGE (ML) INJECTION PRN
Status: DISCONTINUED | OUTPATIENT
Start: 2019-03-06 | End: 2019-03-06 | Stop reason: HOSPADM

## 2019-03-06 RX ORDER — HYDROCODONE BITARTRATE AND ACETAMINOPHEN 5; 325 MG/1; MG/1
1 TABLET ORAL EVERY 6 HOURS PRN
Qty: 28 TABLET | Refills: 0 | Status: SHIPPED | OUTPATIENT
Start: 2019-03-06 | End: 2019-03-13

## 2019-03-06 RX ORDER — LABETALOL HYDROCHLORIDE 5 MG/ML
5 INJECTION, SOLUTION INTRAVENOUS EVERY 10 MIN PRN
Status: DISCONTINUED | OUTPATIENT
Start: 2019-03-06 | End: 2019-03-06 | Stop reason: HOSPADM

## 2019-03-06 RX ORDER — CEFAZOLIN SODIUM 2 G/50ML
2 SOLUTION INTRAVENOUS ONCE
Status: COMPLETED | OUTPATIENT
Start: 2019-03-06 | End: 2019-03-06

## 2019-03-06 RX ORDER — MEPERIDINE HYDROCHLORIDE 25 MG/ML
12.5 INJECTION INTRAMUSCULAR; INTRAVENOUS; SUBCUTANEOUS EVERY 5 MIN PRN
Status: DISCONTINUED | OUTPATIENT
Start: 2019-03-06 | End: 2019-03-06 | Stop reason: HOSPADM

## 2019-03-06 RX ADMIN — HYDROMORPHONE HYDROCHLORIDE 0.5 MG: 1 INJECTION, SOLUTION INTRAMUSCULAR; INTRAVENOUS; SUBCUTANEOUS at 10:43

## 2019-03-06 RX ADMIN — HYDROMORPHONE HYDROCHLORIDE 0.5 MG: 1 INJECTION, SOLUTION INTRAMUSCULAR; INTRAVENOUS; SUBCUTANEOUS at 11:00

## 2019-03-06 RX ADMIN — LIDOCAINE HYDROCHLORIDE 0.1 ML: 10 INJECTION, SOLUTION EPIDURAL; INFILTRATION; INTRACAUDAL; PERINEURAL at 07:54

## 2019-03-06 RX ADMIN — LIDOCAINE HYDROCHLORIDE 60 MG: 20 INJECTION, SOLUTION INFILTRATION; PERINEURAL at 09:35

## 2019-03-06 RX ADMIN — FENTANYL CITRATE 100 MCG: 50 INJECTION INTRAMUSCULAR; INTRAVENOUS at 09:35

## 2019-03-06 RX ADMIN — HYDROMORPHONE HYDROCHLORIDE 0.5 MG: 1 INJECTION, SOLUTION INTRAMUSCULAR; INTRAVENOUS; SUBCUTANEOUS at 10:38

## 2019-03-06 RX ADMIN — ONDANSETRON 4 MG: 2 INJECTION, SOLUTION INTRAMUSCULAR; INTRAVENOUS at 09:44

## 2019-03-06 RX ADMIN — ONDANSETRON 4 MG: 4 TABLET, ORALLY DISINTEGRATING ORAL at 11:55

## 2019-03-06 RX ADMIN — SODIUM CHLORIDE, POTASSIUM CHLORIDE, SODIUM LACTATE AND CALCIUM CHLORIDE: 600; 310; 30; 20 INJECTION, SOLUTION INTRAVENOUS at 07:54

## 2019-03-06 RX ADMIN — DEXAMETHASONE SODIUM PHOSPHATE 8 MG: 4 INJECTION, SOLUTION INTRAMUSCULAR; INTRAVENOUS at 09:44

## 2019-03-06 RX ADMIN — ACETAMINOPHEN 1000 MG: 10 INJECTION, SOLUTION INTRAVENOUS at 07:54

## 2019-03-06 RX ADMIN — LABETALOL HYDROCHLORIDE 5 MG: 5 INJECTION, SOLUTION INTRAVENOUS at 09:56

## 2019-03-06 RX ADMIN — CEFAZOLIN SODIUM 2 G: 2 SOLUTION INTRAVENOUS at 09:28

## 2019-03-06 RX ADMIN — SODIUM CHLORIDE, POTASSIUM CHLORIDE, SODIUM LACTATE AND CALCIUM CHLORIDE: 600; 310; 30; 20 INJECTION, SOLUTION INTRAVENOUS at 09:31

## 2019-03-06 RX ADMIN — FENTANYL CITRATE 25 MCG: 50 INJECTION INTRAMUSCULAR; INTRAVENOUS at 10:02

## 2019-03-06 RX ADMIN — PROPOFOL 200 MG: 10 INJECTION, EMULSION INTRAVENOUS at 09:35

## 2019-03-06 RX ADMIN — HYDROMORPHONE HYDROCHLORIDE 1 MG: 2 INJECTION, SOLUTION INTRAMUSCULAR; INTRAVENOUS; SUBCUTANEOUS at 09:54

## 2019-03-06 RX ADMIN — GLYCOPYRROLATE 0.2 MG: 0.2 INJECTION, SOLUTION INTRAMUSCULAR; INTRAVENOUS at 09:44

## 2019-03-06 ASSESSMENT — PAIN SCALES - GENERAL
PAINLEVEL_OUTOF10: 6
PAINLEVEL_OUTOF10: 7
PAINLEVEL_OUTOF10: 7
PAINLEVEL_OUTOF10: 3
PAINLEVEL_OUTOF10: 0
PAINLEVEL_OUTOF10: 6
PAINLEVEL_OUTOF10: 3
PAINLEVEL_OUTOF10: 3

## 2019-03-06 ASSESSMENT — PULMONARY FUNCTION TESTS
PIF_VALUE: 3
PIF_VALUE: 4
PIF_VALUE: 3
PIF_VALUE: 16
PIF_VALUE: 3
PIF_VALUE: 0
PIF_VALUE: 0
PIF_VALUE: 3
PIF_VALUE: 0
PIF_VALUE: 3
PIF_VALUE: 2
PIF_VALUE: 3
PIF_VALUE: 15
PIF_VALUE: 0
PIF_VALUE: 2
PIF_VALUE: 3
PIF_VALUE: 2
PIF_VALUE: 0
PIF_VALUE: 3
PIF_VALUE: 14
PIF_VALUE: 0
PIF_VALUE: 2
PIF_VALUE: 3
PIF_VALUE: 2
PIF_VALUE: 1
PIF_VALUE: 15
PIF_VALUE: 3
PIF_VALUE: 3
PIF_VALUE: 4
PIF_VALUE: 2
PIF_VALUE: 3
PIF_VALUE: 2
PIF_VALUE: 3
PIF_VALUE: 15
PIF_VALUE: 3
PIF_VALUE: 2
PIF_VALUE: 3
PIF_VALUE: 3
PIF_VALUE: 16
PIF_VALUE: 3
PIF_VALUE: 15
PIF_VALUE: 3
PIF_VALUE: 15
PIF_VALUE: 2
PIF_VALUE: 19
PIF_VALUE: 2
PIF_VALUE: 3
PIF_VALUE: 15
PIF_VALUE: 15
PIF_VALUE: 2
PIF_VALUE: 15
PIF_VALUE: 3
PIF_VALUE: 2

## 2019-03-06 ASSESSMENT — PAIN DESCRIPTION - PAIN TYPE
TYPE: SURGICAL PAIN

## 2019-03-06 ASSESSMENT — PAIN - FUNCTIONAL ASSESSMENT
PAIN_FUNCTIONAL_ASSESSMENT: 0-10
PAIN_FUNCTIONAL_ASSESSMENT: PREVENTS OR INTERFERES SOME ACTIVE ACTIVITIES AND ADLS

## 2019-03-06 ASSESSMENT — PAIN DESCRIPTION - ORIENTATION
ORIENTATION: LEFT

## 2019-03-06 ASSESSMENT — PAIN DESCRIPTION - DESCRIPTORS
DESCRIPTORS: SORE
DESCRIPTORS: ACHING;SHARP
DESCRIPTORS: SORE
DESCRIPTORS: SORE
DESCRIPTORS: DULL
DESCRIPTORS: DULL
DESCRIPTORS: SORE
DESCRIPTORS: DULL

## 2019-03-06 ASSESSMENT — PAIN DESCRIPTION - LOCATION
LOCATION: KNEE

## 2019-03-07 DIAGNOSIS — S83.242A ACUTE MEDIAL MENISCUS TEAR OF LEFT KNEE, INITIAL ENCOUNTER: Primary | ICD-10-CM

## 2019-03-18 ENCOUNTER — HOSPITAL ENCOUNTER (OUTPATIENT)
Dept: PHYSICAL THERAPY | Age: 44
Setting detail: THERAPIES SERIES
Discharge: HOME | End: 2019-03-18
Payer: COMMERCIAL

## 2019-03-18 ENCOUNTER — OFFICE VISIT (OUTPATIENT)
Dept: ORTHOPEDIC SURGERY | Age: 44
End: 2019-03-18

## 2019-03-18 VITALS — BODY MASS INDEX: 36 KG/M2 | WEIGHT: 223.99 LBS | HEIGHT: 66 IN

## 2019-03-18 DIAGNOSIS — Z98.890 S/P ARTHROSCOPIC PARTIAL MEDIAL MENISCECTOMY: Primary | ICD-10-CM

## 2019-03-18 PROCEDURE — 97110 THERAPEUTIC EXERCISES: CPT

## 2019-03-18 PROCEDURE — 99024 POSTOP FOLLOW-UP VISIT: CPT | Performed by: ORTHOPAEDIC SURGERY

## 2019-03-18 PROCEDURE — 97112 NEUROMUSCULAR REEDUCATION: CPT

## 2019-03-18 PROCEDURE — 97161 PT EVAL LOW COMPLEX 20 MIN: CPT

## 2019-03-22 ENCOUNTER — HOSPITAL ENCOUNTER (OUTPATIENT)
Dept: PHYSICAL THERAPY | Age: 44
Setting detail: THERAPIES SERIES
Discharge: HOME | End: 2019-03-22
Payer: COMMERCIAL

## 2019-03-22 PROCEDURE — 97110 THERAPEUTIC EXERCISES: CPT

## 2019-03-22 PROCEDURE — 97112 NEUROMUSCULAR REEDUCATION: CPT

## 2019-03-26 ENCOUNTER — HOSPITAL ENCOUNTER (OUTPATIENT)
Dept: PHYSICAL THERAPY | Age: 44
Setting detail: THERAPIES SERIES
Discharge: HOME | End: 2019-03-26
Payer: COMMERCIAL

## 2019-03-26 PROCEDURE — G0283 ELEC STIM OTHER THAN WOUND: HCPCS

## 2019-03-26 PROCEDURE — 97140 MANUAL THERAPY 1/> REGIONS: CPT

## 2019-03-26 PROCEDURE — 97112 NEUROMUSCULAR REEDUCATION: CPT

## 2019-03-26 PROCEDURE — 97110 THERAPEUTIC EXERCISES: CPT

## 2019-03-28 ENCOUNTER — APPOINTMENT (OUTPATIENT)
Dept: PHYSICAL THERAPY | Age: 44
End: 2019-03-28
Payer: COMMERCIAL

## 2019-04-02 ENCOUNTER — TELEPHONE (OUTPATIENT)
Dept: ORTHOPEDIC SURGERY | Age: 44
End: 2019-04-02

## 2019-04-02 ENCOUNTER — HOSPITAL ENCOUNTER (OUTPATIENT)
Dept: PHYSICAL THERAPY | Age: 44
Setting detail: THERAPIES SERIES
Discharge: HOME | End: 2019-04-02
Payer: COMMERCIAL

## 2019-04-02 PROCEDURE — 97112 NEUROMUSCULAR REEDUCATION: CPT

## 2019-04-02 PROCEDURE — 97110 THERAPEUTIC EXERCISES: CPT

## 2019-04-02 PROCEDURE — G0283 ELEC STIM OTHER THAN WOUND: HCPCS

## 2019-04-02 PROCEDURE — 97140 MANUAL THERAPY 1/> REGIONS: CPT

## 2019-04-02 NOTE — FLOWSHEET NOTE
quad, distal HS \"stick\"  PA/AP tib/fem grd II-III  PROM 8' total                                         NMR re-education                        Step up 8# 2 10ea Ant/Lat x      SLS 20\" 2ea Foam x                          Therapeutic Exercise and NMR EXR  [x] (56841) Provided verbal/tactile cueing for activities related to strengthening, flexibility, endurance, ROM for improvements in LE, proximal hip, and core control with self care, mobility, lifting, ambulation. [x] (35313) Provided verbal/tactile cueing for activities related to improving balance, coordination, kinesthetic sense, posture, motor skill, proprioception  to assist with LE, proximal hip, and core control in self care, mobility, lifting, ambulation and eccentric single leg control.      NMR and Therapeutic Activities:    [x] (34288 or 48654) Provided verbal/tactile cueing for activities related to improving balance, coordination, kinesthetic sense, posture, motor skill, proprioception and motor activation to allow for proper function of core, proximal hip and LE with self care and ADLs  [] (30791) Gait Re-education- Provided training and instruction to the patient for proper LE, core and proximal hip recruitment and positioning and eccentric body weight control with ambulation re-education including up and down stairs     Home Exercise Program:    [x] (39328) Reviewed/Progressed HEP activities related to strengthening, flexibility, endurance, ROM of core, proximal hip and LE for functional self-care, mobility, lifting and ambulation/stair navigation   [x] (61548)Reviewed/Progressed HEP activities related to improving balance, coordination, kinesthetic sense, posture, motor skill, proprioception of core, proximal hip and LE for self care, mobility, lifting, and ambulation/stair navigation      Manual Treatments:  PROM / STM / Oscillations-Mobs:  G-I, II, III, IV (PA's, Inf., Post.)  [x] (17571) Provided manual therapy to mobilize LE, proximal hip and/or LS spine soft tissue/joints for the purpose of modulating pain, promoting relaxation,  increasing ROM, reducing/eliminating soft tissue swelling/inflammation/restriction, improving soft tissue extensibility and allowing for proper ROM for normal function with self care, mobility, lifting and ambulation. Modalities: Electrical stimulation for pain control. Waveform: Premod; Cycle Time: Continuous; Frequency: 80/150 Hz; Amplitude: 6.8 Volts; Treatment time: 10 min with cold pack. Charges:  Timed Code Treatment Minutes: 40   Total Treatment Minutes: 50     [] EVAL low   [x] IY(69016) x  1 4:00P-4:18P  [] IONTO  [x] NMR (93340) x  1 4:18P-4:32P [] VASO  [x] Manual (34990) x  1 4:32P-4:40P  [] Other:  [] TA x       [] Mech Traction (87702)  [] ES(attended) (53583)      [x] ES (un) (36787): 4:40-4:50P     Therapy start time: 4:00PM  Therapy end time: 4:50PM    GOALS:   Patient stated goal: to able to walk normally, to be able to perform elliptical/cardio    Therapist goals for Patient:   Short Term Goals: To be achieved in: 2-4 weeks  1. Independent in HEP and progression per patient tolerance, in order to prevent re-injury. 2. Patient will have a decrease in pain to facilitate improvement in movement, function, and ADLs as indicated by Functional Deficits. Long Term Goals: To be achieved in: 12 weeks  1. Disability index score of 30% or less for the LEFS to assist with reaching prior level of function. 2. Patient will demonstrate increased AROM to =RLE to allow for proper joint functioning as indicated by patients Functional Deficits. 3. Patient will demonstrate an increase in LLE Strength to>=5-/5 all planes allow for proper functional mobility as indicated by patients Functional Deficits. 4. Patient will return to all work-related and social/recreational functional activities without increased symptoms or restriction.    5. Pt will be able to walk community distances independently/ non antalgic

## 2019-04-04 ENCOUNTER — HOSPITAL ENCOUNTER (OUTPATIENT)
Dept: PHYSICAL THERAPY | Age: 44
Setting detail: THERAPIES SERIES
Discharge: HOME | End: 2019-04-04
Payer: COMMERCIAL

## 2019-04-04 PROCEDURE — 97140 MANUAL THERAPY 1/> REGIONS: CPT

## 2019-04-04 PROCEDURE — 97112 NEUROMUSCULAR REEDUCATION: CPT

## 2019-04-04 PROCEDURE — 97110 THERAPEUTIC EXERCISES: CPT

## 2019-04-04 NOTE — FLOWSHEET NOTE
[address] [place] [address] [facility]    Physical Therapy Daily Treatment Note  Date:  2019    Patient Name:  Venkatesh Chopra    :  1975  MRN: [id number]  Restrictions/Precautions:    Medical/Treatment Diagnosis Information:  Diagnosis: S44.130S (ICD-10-CM) - Acute medial meniscus tear of left knee, initial encounter  Treatment Diagnosis: M25.562 L knee pain s/p arthroscopic PMM and chondroplasty (DOS 98)  Insurance/Certification information:  PT Insurance Information: [facility]  Physician Information:  Referring Practitioner: Sam Tejada DO  Plan of care signed (Y/N): Y    Date of Patient follow up with Physician: PRN    G-Code (if applicable):      Date G-Code Applied:  3/18/19       Progress Note: []  Yes  [x]  No  Next due by: Visit #10       Latex Allergy:  [x]NO      []YES  Preferred Language for Healthcare:   [x]English       []other:    Visit # Insurance Allowable   5  12 visits approved     Pain level:  2-4/10      SUBJECTIVE:  Pt reports no issues after her last visit. She was able to do some trials on her motorcycle. She mostly just notices it when she has been spending a lot of time on her leg or deep bending. OBJECTIVE: See eval  Observation: Ambulation is independent, non-antalgic  Test measurements:  Knee ROM 0-120     RESTRICTIONS/PRECAUTIONS: Knee scope, Stent    Exercises/Interventions:     Therapeutic Ex/NMR Ex Sets/sec Reps Notes HEP   Quad Set  x   Gastroc/HSS w/ strap  x   Heel slide w/ strap 5\" 10  x   SLR flex 2 15 2# x   SLR abd 2 10 2# x   LAQ  3# x   Heel raises 3 10  x          Bike  6'  Lvl 3     Incline S 30\" 2     STS 1 15 Standard chair  x   Leg Press 3  3 10  10 110#DL  70#SL       MARY abd  TKE 2   10ea   45# L/R             Pt Education: Pt educated on POC and HEP.  2'  -Pt demonstrated understanding    Manual Intervention       STM to gastroc/ant tib/ quad, distal HS \"stick\"    PROM 8' total NMR re-education                        Step up 8# 2 10ea Ant/Lat x      SLS 20\" 2ea Foam x                          Therapeutic Exercise and NMR EXR  [x] (63953) Provided verbal/tactile cueing for activities related to strengthening, flexibility, endurance, ROM for improvements in LE, proximal hip, and core control with self care, mobility, lifting, ambulation. [x] (84125) Provided verbal/tactile cueing for activities related to improving balance, coordination, kinesthetic sense, posture, motor skill, proprioception  to assist with LE, proximal hip, and core control in self care, mobility, lifting, ambulation and eccentric single leg control.      NMR and Therapeutic Activities:    [x] (62966 or 37178) Provided verbal/tactile cueing for activities related to improving balance, coordination, kinesthetic sense, posture, motor skill, proprioception and motor activation to allow for proper function of core, proximal hip and LE with self care and ADLs  [] (73398) Gait Re-education- Provided training and instruction to the patient for proper LE, core and proximal hip recruitment and positioning and eccentric body weight control with ambulation re-education including up and down stairs     Home Exercise Program:    [x] (19319) Reviewed/Progressed HEP activities related to strengthening, flexibility, endurance, ROM of core, proximal hip and LE for functional self-care, mobility, lifting and ambulation/stair navigation   [x] (06176)Reviewed/Progressed HEP activities related to improving balance, coordination, kinesthetic sense, posture, motor skill, proprioception of core, proximal hip and LE for self care, mobility, lifting, and ambulation/stair navigation      Manual Treatments:  PROM / STM / Oscillations-Mobs:  G-I, II, III, IV (PA's, Inf., Post.)  [x] (95871) Provided manual therapy to mobilize LE, proximal hip and/or LS spine soft tissue/joints for the purpose of modulating pain, promoting relaxation,  increasing ROM, reducing/eliminating soft tissue swelling/inflammation/restriction, improving soft tissue extensibility and allowing for proper ROM for normal function with self care, mobility, lifting and ambulation. Modalities:CP x10'    Charges:   Timed Code Treatment Minutes: 38   Total Treatment Minutes: 48     [] EVAL low   [x] SD(89671) x  1 4:00P-4:20P  [] IONTO  [x] NMR (15667) x  1 4:06E-1:74L [] VASO  [x] Manual (47098) x  1 6:51C-3:39M  [x] Other: Cold modality: 0:38K-0:32K  [] TA x       [] Mech Traction (73090)  [] ES(attended) (21089)      [] ES (un) (72697): Therapy start time: 4:00PM  Therapy end time: 4:48PM    GOALS:    Patient stated goal: to able to walk normally, to be able to perform elliptical/cardio    Therapist goals for Patient:   Short Term Goals: To be achieved in: 2-4 weeks  1. Independent in HEP and progression per patient tolerance, in order to prevent re-injury. 2. Patient will have a decrease in pain to facilitate improvement in movement, function, and ADLs as indicated by Functional Deficits. Long Term Goals: To be achieved in: 12 weeks  1. Disability index score of 30% or less for the LEFS to assist with reaching prior level of function. 2. Patient will demonstrate increased AROM to =RLE to allow for proper joint functioning as indicated by patients Functional Deficits. 3. Patient will demonstrate an increase in LLE Strength to>=5-/5 all planes allow for proper functional mobility as indicated by patients Functional Deficits. 4. Patient will return to all work-related and social/recreational functional activities without increased symptoms or restriction. 5. Pt will be able to walk community distances independently/ non antalgic without any increased symptoms (patient specific functional goal)     Progression Towards Functional goals:  [x] Patient is progressing as expected towards functional goals listed. [] Progression is slowed due to complexities listed.   []

## 2019-04-09 ENCOUNTER — APPOINTMENT (OUTPATIENT)
Dept: PHYSICAL THERAPY | Age: 44
End: 2019-04-09
Payer: COMMERCIAL

## 2019-04-15 ENCOUNTER — OFFICE VISIT (OUTPATIENT)
Dept: ORTHOPEDIC SURGERY | Age: 44
End: 2019-04-15

## 2019-04-15 VITALS
HEIGHT: 66 IN | WEIGHT: 223.99 LBS | SYSTOLIC BLOOD PRESSURE: 124 MMHG | DIASTOLIC BLOOD PRESSURE: 74 MMHG | BODY MASS INDEX: 36 KG/M2 | HEART RATE: 78 BPM

## 2019-04-15 DIAGNOSIS — Z98.890 S/P ARTHROSCOPIC PARTIAL MEDIAL MENISCECTOMY: Primary | ICD-10-CM

## 2019-04-15 PROCEDURE — 99024 POSTOP FOLLOW-UP VISIT: CPT | Performed by: ORTHOPAEDIC SURGERY

## 2019-04-15 NOTE — PROGRESS NOTES
Chief Complaint  Post-Op Check (ck left knee sx 03/6/2019 )    Post op left knee arthroscopy partial medial meniscectomy and chondroplasty  DOS: 3/6/19       History of Present Illness:  Sruthi García is a 37 y.o. female  Here for 6 week follow up of left knee arthroscopy partial medial meniscectomy and chondroplasty. Progressing well. Has completed physical therapy, has 2 out of 10 discomfort, her only complaint at after being up on her feet for long periods of time she has swelling in the posterior aspect of the knee. she denies medial sided pain, catching or locking. PHYSICAL EXAMINATION:    /74   Pulse 78   Ht 5' 5.51\" (1.664 m)   Wt 223 lb 15.8 oz (101.6 kg)   BMI 36.69 kg/m²     Pain Assessment:  Pain Assessment  Location of Pain: Knee  Location Modifiers: Left  Severity of Pain: 2  Quality of Pain: Dull  Duration of Pain: A few minutes  Frequency of Pain: Intermittent  Aggravating Factors: Standing, Walking, Stairs  Limiting Behavior: Yes  Relieving Factors: Rest  Result of Injury: Yes  Work-Related Injury: No  Are there other pain locations you wish to document?: No    Knee Examination:  Patient is awake, alert, and in no acute distress. Incisions are well-healed, very mild effusion, no tenderness to palpation along medial and lateral joint lines. Tolerates full range of motion 0-140°. Distal neurovascular exam is intact. She does have fullness and swelling persist posteriorly consistent with a Baker cyst    None  Diagnostic Test Findings: No new xrays taken today      Assessment: Progressing well post op from left knee arthroscopy with partial medial meniscectomy and chondroplasty. Impression:  Encounter Diagnosis   Name Primary?  S/P arthroscopic partial medial meniscectomy Yes         Treatment Plan:  Swelling is likely related to attending ice, anti-inflammatories and home exercise program.  Continue to modify activities as needed.   She denies follow-up in the future for

## 2019-04-17 ENCOUNTER — OFFICE VISIT (OUTPATIENT)
Dept: PULMONOLOGY | Age: 44
End: 2019-04-17
Payer: COMMERCIAL

## 2019-04-17 VITALS
SYSTOLIC BLOOD PRESSURE: 136 MMHG | HEART RATE: 68 BPM | RESPIRATION RATE: 20 BRPM | HEIGHT: 66 IN | OXYGEN SATURATION: 97 % | WEIGHT: 223.2 LBS | DIASTOLIC BLOOD PRESSURE: 77 MMHG | BODY MASS INDEX: 35.87 KG/M2 | TEMPERATURE: 98.5 F

## 2019-04-17 DIAGNOSIS — G47.33 OSA (OBSTRUCTIVE SLEEP APNEA): Primary | ICD-10-CM

## 2019-04-17 PROCEDURE — 99213 OFFICE O/P EST LOW 20 MIN: CPT | Performed by: INTERNAL MEDICINE

## 2019-04-17 ASSESSMENT — SLEEP AND FATIGUE QUESTIONNAIRES
HOW LIKELY ARE YOU TO NOD OFF OR FALL ASLEEP WHILE SITTING AND TALKING TO SOMEONE: 0
HOW LIKELY ARE YOU TO NOD OFF OR FALL ASLEEP WHILE SITTING INACTIVE IN A PUBLIC PLACE: 0
HOW LIKELY ARE YOU TO NOD OFF OR FALL ASLEEP WHILE WATCHING TV: 0
ESS TOTAL SCORE: 0
NECK CIRCUMFERENCE (INCHES): 14.25
HOW LIKELY ARE YOU TO NOD OFF OR FALL ASLEEP WHILE SITTING QUIETLY AFTER LUNCH WITHOUT ALCOHOL: 0
HOW LIKELY ARE YOU TO NOD OFF OR FALL ASLEEP WHILE LYING DOWN TO REST IN THE AFTERNOON WHEN CIRCUMSTANCES PERMIT: 0
HOW LIKELY ARE YOU TO NOD OFF OR FALL ASLEEP WHILE SITTING AND READING: 0
HOW LIKELY ARE YOU TO NOD OFF OR FALL ASLEEP IN A CAR, WHILE STOPPED FOR A FEW MINUTES IN TRAFFIC: 0
HOW LIKELY ARE YOU TO NOD OFF OR FALL ASLEEP WHEN YOU ARE A PASSENGER IN A CAR FOR AN HOUR WITHOUT A BREAK: 0

## 2019-04-17 NOTE — LETTER
[facility] Pulmonary, Critical Care, and Sleep  [address], [address] [place] [address]  Phone: [phone]  Fax: [phone]    April 17, 2019     Patient: Ale Pacheco   YOB: 1975   Date of Visit: 4/17/2019       To Whom it May Concern:    Desean Santos was seen in my clinic on 4/17/19. Based on the available data, patient is using his CPAP correctly and has archived adequate sleep with no residual sleepiness is present at this time. Patient is cleared to resume working and driving. The patient is requested to continue using her CPAP every night and maintain regular exercise program.     If you have any questions or concerns, please don't hesitate to call.     Sincerely,         Sandra Ontiveros MD

## 2019-04-18 ENCOUNTER — HOSPITAL ENCOUNTER (OUTPATIENT)
Dept: PHYSICAL THERAPY | Age: 44
Setting detail: THERAPIES SERIES
Discharge: HOME | End: 2019-04-18
Payer: COMMERCIAL

## 2019-04-18 PROCEDURE — 97140 MANUAL THERAPY 1/> REGIONS: CPT

## 2019-04-18 PROCEDURE — 97110 THERAPEUTIC EXERCISES: CPT

## 2019-04-18 NOTE — FLOWSHEET NOTE
mobilize LE, proximal hip and/or LS spine soft tissue/joints for the purpose of modulating pain, promoting relaxation,  increasing ROM, reducing/eliminating soft tissue swelling/inflammation/restriction, improving soft tissue extensibility and allowing for proper ROM for normal function with self care, mobility, lifting and ambulation. Modalities:CP x10'    Charges:   Timed Code Treatment Minutes: 30   Total Treatment Minutes: 40     [] EVAL low   [x] OG(33594) x  1 3:35P-3:55P  [] IONTO  [] NMR (78775) x      [] VASO  [x] Manual (23309) x  1 9:11Z-3:57U  [x] Other: Cold modality: 8:61Y-2:05P  [] TA x       [] Mech Traction (13881)  [] ES(attended) (13239)      [] ES (un) (98253): Therapy start time: 3:35PM  Therapy end time: 4:15PM    GOALS:    Patient stated goal: to able to walk normally, to be able to perform elliptical/cardio    Therapist goals for Patient:   Short Term Goals: To be achieved in: 2-4 weeks  1. Independent in HEP and progression per patient tolerance, in order to prevent re-injury. 2. Patient will have a decrease in pain to facilitate improvement in movement, function, and ADLs as indicated by Functional Deficits. Long Term Goals: To be achieved in: 12 weeks  1. Disability index score of 30% or less for the LEFS to assist with reaching prior level of function. 2. Patient will demonstrate increased AROM to =RLE to allow for proper joint functioning as indicated by patients Functional Deficits. 3. Patient will demonstrate an increase in LLE Strength to>=5-/5 all planes allow for proper functional mobility as indicated by patients Functional Deficits. 4. Patient will return to all work-related and social/recreational functional activities without increased symptoms or restriction.    5. Pt will be able to walk community distances independently/ non antalgic without any increased symptoms (patient specific functional goal)     Progression Towards Functional goals:  [x] Patient is progressing as expected towards functional goals listed. [] Progression is slowed due to complexities listed. [] Progression has been slowed due to co-morbidities. [] Plan just implemented, too soon to assess goals progression  [] Other:     ASSESSMENT:  Held some exercises and regressed some activities above due to higher activity level during the day and soreness levels. Pt educated on activity modification. Manual emphasis this date on distal hamstring region. Will continue to progress as appropriate/as tolerated.      Treatment/Activity Tolerance:  [x] Patient tolerated treatment well [] Patient limited by fatique  [] Patient limited by pain  [] Patient limited by other medical complications  [] Other:     Prognosis: [x] Good [] Fair  [] Poor    Patient Requires Follow-up: [x] Yes  [] No    PLAN: See eval  [x] Continue per plan of care [] Alter current plan (see comments)  [] Plan of care initiated [] Hold pending MD visit [] Discharge    Electronically signed by: Homer Daigle PT, DPT

## 2019-04-22 ENCOUNTER — HOSPITAL ENCOUNTER (OUTPATIENT)
Dept: PHYSICAL THERAPY | Age: 44
Setting detail: THERAPIES SERIES
Discharge: HOME | End: 2019-04-22
Payer: COMMERCIAL

## 2019-04-22 PROCEDURE — 97110 THERAPEUTIC EXERCISES: CPT

## 2019-04-22 NOTE — FLOWSHEET NOTE
swelling/inflammation/restriction, improving soft tissue extensibility and allowing for proper ROM for normal function with self care, mobility, lifting and ambulation. Modalities:CP x10'    Charges:   Timed Code Treatment Minutes: 30   Total Treatment Minutes: 40     [] EVAL low   [x] ML(29899) x  2 1:83H-8:57C  [] IONTO  [] NMR (75652) x         [] VASO  [] Manual (35677) x    9:55A-10:00A  [x] Other: Cold modality: 10:01A-10:10A  [] TA x       [] Mech Traction (83049)  [] ES(attended) (53712)      [] ES (un) (01100): Therapy start time: 9:30A  Therapy end time: 10:10A    GOALS:    Patient stated goal: to able to walk normally, to be able to perform elliptical/cardio    Therapist goals for Patient:   Short Term Goals: To be achieved in: 2-4 weeks  1. Independent in HEP and progression per patient tolerance, in order to prevent re-injury. 2. Patient will have a decrease in pain to facilitate improvement in movement, function, and ADLs as indicated by Functional Deficits. Long Term Goals: To be achieved in: 12 weeks  1. Disability index score of 30% or less for the LEFS to assist with reaching prior level of function. 2. Patient will demonstrate increased AROM to =RLE to allow for proper joint functioning as indicated by patients Functional Deficits. 3. Patient will demonstrate an increase in LLE Strength to>=5-/5 all planes allow for proper functional mobility as indicated by patients Functional Deficits. 4. Patient will return to all work-related and social/recreational functional activities without increased symptoms or restriction. 5. Pt will be able to walk community distances independently/ non antalgic without any increased symptoms (patient specific functional goal)     Progression Towards Functional goals:  [x] Patient is progressing as expected towards functional goals listed. [] Progression is slowed due to complexities listed.   [] Progression has been slowed due to co-morbidities. [] Plan just implemented, too soon to assess goals progression  [] Other:     ASSESSMENT:  Continued soreness onset with specific activities (WB SLS and squatting). Able to maintain program from last session. Will continue to progress as appropriate/as tolerated NV.     Treatment/Activity Tolerance:  [x] Patient tolerated treatment well [] Patient limited by fatique  [] Patient limited by pain  [] Patient limited by other medical complications  [] Other:     Prognosis: [x] Good [] Fair  [] Poor    Patient Requires Follow-up: [x] Yes  [] No    PLAN: See eval  [x] Continue per plan of care [] Alter current plan (see comments)  [] Plan of care initiated [] Hold pending MD visit [] Discharge    Electronically signed by: Bryce Brown PT, DPT

## 2019-04-24 ENCOUNTER — HOSPITAL ENCOUNTER (OUTPATIENT)
Dept: PHYSICAL THERAPY | Age: 44
Setting detail: THERAPIES SERIES
Discharge: HOME | End: 2019-04-24
Payer: COMMERCIAL

## 2019-04-24 PROCEDURE — 97140 MANUAL THERAPY 1/> REGIONS: CPT

## 2019-04-24 PROCEDURE — 97110 THERAPEUTIC EXERCISES: CPT

## 2019-04-24 NOTE — FLOWSHEET NOTE
[address] [place] [address] [facility]    Physical Therapy Daily Treatment Note  Date:  2019    Patient Name:  Chirag Allison    :  1975  MRN: [id number]  Restrictions/Precautions:    Medical/Treatment Diagnosis Information:  Diagnosis: P23.450E (ICD-10-CM) - Acute medial meniscus tear of left knee, initial encounter  Treatment Diagnosis: M25.562 L knee pain s/p arthroscopic PMM and chondroplasty (DOS 6/3/37)  Insurance/Certification information:  PT Insurance Information: [facility]  Physician Information:  Referring Practitioner: Castro Neumann DO  Plan of care signed (Y/N): Y    Date of Patient follow up with Physician: PRN    G-Code (if applicable):      Date G-Code Applied:  3/18/19       Progress Note: []  Yes  [x]  No  Next due by: Visit #10       Latex Allergy:  [x]NO      []YES  Preferred Language for Healthcare:   [x]English       []other:    Visit # Insurance Allowable   8  12 visits approved     Pain level:  -4/10      SUBJECTIVE:  Pt reports she is a little tired, she had to do a stress test this morning. Still having some anterior knee soreness, which she attributes to motorcycle riding. OBJECTIVE: See eval   Observation: Ambulation is independent, non-antalgic   Test measurements:  Knee ROM 0-120     RESTRICTIONS/PRECAUTIONS: Knee scope, Stent    Exercises/Interventions:     Therapeutic Ex/NMR Ex Sets/sec Reps Notes HEP   Quad Set  x   Gastroc/HSS w/ strap 30\" 2  x   Heel slide w/ strap  x   SLR flex 1# x   SLR abd 1# x   LAQ  3# x   Heel raises 3 10  x   Bridge 2 10  x          Bike  5'  Lvl 3     Incline S 30\" 2     STS Standard chair  x   Leg Press 3   10   100#DL         Knee ext 3 10 15#    MARY abd  TKE 3  3\" 10ea  10 30# L only  90# L    LBW  4 20ft GTB x          See PTA Note 30'             Pt Education: Pt educated on POC and HEP.  2'  -Pt demonstrated understanding    Manual Intervention       STM to gastroc// quad,   distal HS \"stick\"    PROM 8' total                                         NMR re-education                        Step up 8# 2 10 Lat x      SLS 20\" 4ea Foam x                          Therapeutic Exercise and NMR EXR  [x] (37692) Provided verbal/tactile cueing for activities related to strengthening, flexibility, endurance, ROM for improvements in LE, proximal hip, and core control with self care, mobility, lifting, ambulation. [x] (33912) Provided verbal/tactile cueing for activities related to improving balance, coordination, kinesthetic sense, posture, motor skill, proprioception  to assist with LE, proximal hip, and core control in self care, mobility, lifting, ambulation and eccentric single leg control.      NMR and Therapeutic Activities:    [x] (39958 or 13659) Provided verbal/tactile cueing for activities related to improving balance, coordination, kinesthetic sense, posture, motor skill, proprioception and motor activation to allow for proper function of core, proximal hip and LE with self care and ADLs  [] (77679) Gait Re-education- Provided training and instruction to the patient for proper LE, core and proximal hip recruitment and positioning and eccentric body weight control with ambulation re-education including up and down stairs     Home Exercise Program:    [x] (30440) Reviewed/Progressed HEP activities related to strengthening, flexibility, endurance, ROM of core, proximal hip and LE for functional self-care, mobility, lifting and ambulation/stair navigation   [x] (73766)Reviewed/Progressed HEP activities related to improving balance, coordination, kinesthetic sense, posture, motor skill, proprioception of core, proximal hip and LE for self care, mobility, lifting, and ambulation/stair navigation      Manual Treatments:  PROM / STM / Oscillations-Mobs:  G-I, II, III, IV (PA's, Inf., Post.)  [x] (74615) Provided manual therapy to mobilize LE, proximal hip and/or LS spine soft tissue/joints for the purpose of modulating pain, promoting relaxation,  increasing ROM, reducing/eliminating soft tissue swelling/inflammation/restriction, improving soft tissue extensibility and allowing for proper ROM for normal function with self care, mobility, lifting and ambulation. Modalities:CP x10'    Charges:   Timed Code Treatment Minutes: 40   Total Treatment Minutes: 50     [] EVAL low   [x] CX(34889) x  2 3:35P- 4:05P [] IONTO  [] NMR (64122) x         [] VASO  [x] Manual (18055) x  1 4:06P-4:14P     [x] Other: Cold modality: 4:15P-4:25P  [] TA x       [] Mech Traction (44315)  [] ES(attended) (20882)      [] ES (un) (25478): Therapy start time: 3:35P  Therapy end time:  4:25P    GOALS:    Patient stated goal: to able to walk normally, to be able to perform elliptical/cardio    Therapist goals for Patient:   Short Term Goals: To be achieved in: 2-4 weeks  1. Independent in HEP and progression per patient tolerance, in order to prevent re-injury. 2. Patient will have a decrease in pain to facilitate improvement in movement, function, and ADLs as indicated by Functional Deficits. Long Term Goals: To be achieved in: 12 weeks  1. Disability index score of 30% or less for the LEFS to assist with reaching prior level of function. 2. Patient will demonstrate increased AROM to =RLE to allow for proper joint functioning as indicated by patients Functional Deficits. 3. Patient will demonstrate an increase in LLE Strength to>=5-/5 all planes allow for proper functional mobility as indicated by patients Functional Deficits. 4. Patient will return to all work-related and social/recreational functional activities without increased symptoms or restriction. 5. Pt will be able to walk community distances independently/ non antalgic without any increased symptoms (patient specific functional goal)     Progression Towards Functional goals:  [x] Patient is progressing as expected towards functional goals listed.     [] Progression

## 2019-04-24 NOTE — FLOWSHEET NOTE
Physical Therapist Assistant Activity Sheet    Date:  2019    Patient Name:  Dalia Mccracken    :  1975  MRN: [id number]  Restrictions/Precautions:    Medical/Treatment Diagnosis Information:  ·   Diagnosis: H43.586M (ICD-10-CM) - Acute medial meniscus tear of left knee, initial encounter  · Treatment Diagnosis: M25.562 L knee pain s/p arthroscopic PMM and chondroplasty (DOS 3/6/19)  Physician Information:    Referring Practitioner: Barney Gregory DO      Weeks Post-op  8 wks  12 wks 16 wks 20 wks   24 wks                            Activities                                                DOS/DOI:                                                    Date: 19    Bike    Elliptical    Treadmill    Airdyne        Gastroc stretch 30\" x 3/ 3 x 10   Soleus stretch    Hamstring stretch    ITB stretch    Hip Flexor stretch    Quad stretch    Adductor stretch        Weight Shifting sp                              fp                              tp    Lateral walking (with/w/o TB)        Balance: PEP/Allie board                   SLS Blue Foam 10\" x 10         Star excursion load/explode          Extremity reach UE/LE        Leg Press Hemant. 100# x 30                     Ecc.                      Inv. Calf Press Hemant. Ecc.                      Inv.        MARY   Flex               ABd R/L 30# x 30               ADd               TKE L 90# x 30              Ext        Steps  Up                Up and Over                Down                Lateral 6\" x 30 O/B               Rotation        Squats:  Mini                   Wall                   [facility]        Lunges:  Lunge to Balance                   Balance to Lunge                   Walking        Knee Extension Bilat. (90-40) 15# x 30                              Ecc.                               Inv. Hamstring Curls Bilat. Ecc.                                Inv.        Soleus Press Bilat. Ecc.                            Inv. Ladders    Square    Jump/Hop  Low                       Med.                       High            Modality    PTA Assessment Patient handled exercises well, installed single leg balance and limited knee extensions.     Time Based Treatment 30 minutes

## 2019-04-29 ENCOUNTER — HOSPITAL ENCOUNTER (OUTPATIENT)
Dept: PHYSICAL THERAPY | Age: 44
Setting detail: THERAPIES SERIES
Discharge: HOME | End: 2019-04-29
Payer: COMMERCIAL

## 2019-04-29 PROCEDURE — 97110 THERAPEUTIC EXERCISES: CPT

## 2019-04-29 PROCEDURE — 97140 MANUAL THERAPY 1/> REGIONS: CPT

## 2019-04-29 NOTE — FLOWSHEET NOTE
[address] [place] [address] [facility]    Physical Therapy Daily Treatment Note  Date:  2019    Patient Name:  Soheila Monroy    :  1975  MRN: [id number]  Restrictions/Precautions:    Medical/Treatment Diagnosis Information:  Diagnosis: F72.006L (ICD-10-CM) - Acute medial meniscus tear of left knee, initial encounter  Treatment Diagnosis: M25.562 L knee pain s/p arthroscopic PMM and chondroplasty (DOS 33)  Insurance/Certification information:  PT Insurance Information: [id number] [place] [facility]  Physician Information:  Referring Practitioner: Anuradha Morales DO  Plan of care signed (Y/N): Y    Date of Patient follow up with Physician: PRN    G-Code (if applicable):      Date G-Code Applied:  3/18/19       Progress Note: []  Yes  [x]  No  Next due by: Visit #10       Latex Allergy:  [x]NO      []YES  Preferred Language for Healthcare:   [x]English       []other:    Visit # Insurance Allowable   9  12 visits approved     Pain level:  0-2/10      SUBJECTIVE:  Pt reports she is feeling better today. No knee pain at this time. She has not been using her motorcycle as much. OBJECTIVE: See eval   Observation: Ambulation is independent, non-antalgic   Test measurements:  Knee ROM 0-120     RESTRICTIONS/PRECAUTIONS: Knee scope, Stent    Exercises/Interventions:     Therapeutic Ex/NMR Ex Sets/sec Reps Notes HEP   Quad Set  x   Gastroc/HSS w/ strap 30\" 3  x   Heel slide w/ strap 5\" 10  x   SLR flex 1# x   SLR abd 1# x   LAQ  3# x   Heel raises 3 10  x   Bridge  x          Bike  6'  Lvl 3     Incline S 30\" 3     STS Standard chair  x   Leg Press 3   10   100#DL         Knee ext 15#    MARY abd  TKE 30# L only  90# L    LBW  5 20ft OVL x   Monster Walks 5 20ft OVL                         Pt Education: Pt educated on POC and HEP.  2'  -Pt demonstrated understanding    Manual Intervention       STM to gastroc// quad,   distal HS \"stick\"    PROM 8' total NMR re-education                        Step up 8# 3 10ea Ant/Lat x      SLS 20\" 5 Foam x                          Therapeutic Exercise and NMR EXR  [x] (51954) Provided verbal/tactile cueing for activities related to strengthening, flexibility, endurance, ROM for improvements in LE, proximal hip, and core control with self care, mobility, lifting, ambulation. [x] (89638) Provided verbal/tactile cueing for activities related to improving balance, coordination, kinesthetic sense, posture, motor skill, proprioception  to assist with LE, proximal hip, and core control in self care, mobility, lifting, ambulation and eccentric single leg control.      NMR and Therapeutic Activities:    [x] (74194 or 40600) Provided verbal/tactile cueing for activities related to improving balance, coordination, kinesthetic sense, posture, motor skill, proprioception and motor activation to allow for proper function of core, proximal hip and LE with self care and ADLs  [] (64318) Gait Re-education- Provided training and instruction to the patient for proper LE, core and proximal hip recruitment and positioning and eccentric body weight control with ambulation re-education including up and down stairs     Home Exercise Program:    [x] (89712) Reviewed/Progressed HEP activities related to strengthening, flexibility, endurance, ROM of core, proximal hip and LE for functional self-care, mobility, lifting and ambulation/stair navigation   [x] (28829)Reviewed/Progressed HEP activities related to improving balance, coordination, kinesthetic sense, posture, motor skill, proprioception of core, proximal hip and LE for self care, mobility, lifting, and ambulation/stair navigation      Manual Treatments:  PROM / STM / Oscillations-Mobs:  G-I, II, III, IV (PA's, Inf., Post.)  [x] (21355) Provided manual therapy to mobilize LE, proximal hip and/or LS spine soft tissue/joints for the purpose of modulating pain, promoting relaxation,  increasing ROM, reducing/eliminating soft tissue swelling/inflammation/restriction, improving soft tissue extensibility and allowing for proper ROM for normal function with self care, mobility, lifting and ambulation. Modalities: Declined    Charges:   Timed Code Treatment Minutes: 40   Total Treatment Minutes: 40     [] EVAL low   [x] DD(10414) x  2 4:00P- 4:31P [] IONTO  [] NMR (57263) x         [] VASO  [x] Manual (71394) x  1 5:53I-6:16C     [] Other:   [] TA x       [] Mech Traction (71916)  [] ES(attended) (92883)      [] ES (un) (98350): Therapy start time: 4:00P  Therapy end time:  4:40P    GOALS:    Patient stated goal: to able to walk normally, to be able to perform elliptical/cardio    Therapist goals for Patient:   Short Term Goals: To be achieved in: 2-4 weeks  1. Independent in HEP and progression per patient tolerance, in order to prevent re-injury. 2. Patient will have a decrease in pain to facilitate improvement in movement, function, and ADLs as indicated by Functional Deficits. Long Term Goals: To be achieved in: 12 weeks  1. Disability index score of 30% or less for the LEFS to assist with reaching prior level of function. 2. Patient will demonstrate increased AROM to =RLE to allow for proper joint functioning as indicated by patients Functional Deficits. 3. Patient will demonstrate an increase in LLE Strength to>=5-/5 all planes allow for proper functional mobility as indicated by patients Functional Deficits. 4. Patient will return to all work-related and social/recreational functional activities without increased symptoms or restriction. 5. Pt will be able to walk community distances independently/ non antalgic without any increased symptoms (patient specific functional goal)     Progression Towards Functional goals:  [x] Patient is progressing as expected towards functional goals listed. [] Progression is slowed due to complexities listed.   [] Progression has been slowed due to co-morbidities. [] Plan just implemented, too soon to assess goals progression  [] Other:     ASSESSMENT:  Focused on functional strength this date vs. machine strengthening. Less overall soreness reported prior/during/post session this date. Will continue to progress as appropriate/as tolerated NV.      Treatment/Activity Tolerance:  [x] Patient tolerated treatment well [] Patient limited by fatique  [] Patient limited by pain  [] Patient limited by other medical complications  [] Other:     Prognosis: [x] Good [] Fair  [] Poor    Patient Requires Follow-up: [x] Yes  [] No    PLAN: See eval. Weaning to 1x per week  [x] Continue per plan of care [] Alter current plan (see comments)  [] Plan of care initiated [] Hold pending MD visit [] Discharge    Electronically signed by: Marlene Jesus PT, DPT

## 2019-07-12 ENCOUNTER — OFFICE VISIT (OUTPATIENT)
Dept: ORTHOPEDIC SURGERY | Age: 44
End: 2019-07-12
Payer: COMMERCIAL

## 2019-07-12 VITALS — BODY MASS INDEX: 35.86 KG/M2 | HEIGHT: 66 IN | WEIGHT: 223.11 LBS

## 2019-07-12 DIAGNOSIS — Z98.890 S/P ARTHROSCOPIC PARTIAL MEDIAL MENISCECTOMY: Primary | ICD-10-CM

## 2019-07-12 PROCEDURE — 99213 OFFICE O/P EST LOW 20 MIN: CPT | Performed by: ORTHOPAEDIC SURGERY

## 2019-07-12 NOTE — PROGRESS NOTES
Chief Complaint  Follow-up (wc l knee- back of knee and leg has gotten worse )    Post op left knee arthroscopy partial medial meniscectomy and chondroplasty  DOS: 3/6/19       History of Present Illness:  Duane Moores is a 37 y.o. female  Here for 4 month follow up of left knee arthroscopy partial medial meniscectomy and chondroplasty. He has completed physical therapy, has been back to work with no restrictions, she has occasional grinding under the kneecap with range of motion but states her knee feels pretty good however she continues to have posterior pain and tightness, she has had this since her initial injury. She states after walking half a day she notices it feels like a tightening or cramping from her mid hamstring to her mid calf. She has significant tenderness along the popliteal aspect of the knee. Denies radiation of pain down the leg, denies numbness and tingling in the leg. she denies medial sided pain, catching or locking. PHYSICAL EXAMINATION:    Ht 5' 5.98\" (1.676 m)   Wt 223 lb 1.7 oz (101.2 kg)   BMI 36.03 kg/m²     Pain Assessment:  Pain Assessment  Location of Pain: Knee  Location Modifiers: Left  Severity of Pain: 3  Quality of Pain: Aching, Dull  Duration of Pain: Persistent  Frequency of Pain: Constant  Aggravating Factors: Walking, Stairs, Standing, Squatting, Exercise, Kneeling, Straightening, Stretching, Bending  Limiting Behavior: Yes  Result of Injury: No  Work-Related Injury: No  Are there other pain locations you wish to document?: No    Knee Examination:  Patient is awake, alert, and in no acute distress. Incisions are well-healed, no effusion, no tenderness to palpation along the medial or lateral joint lines, she does have crepitus under the patella with range of motion which has been consistent. She also has fullness posteriorly consistent with a Baker's cyst, this is tender to palpation.   She also has tenderness along the medial lateral hamstrings as well as

## 2019-07-24 DIAGNOSIS — S83.242A ACUTE MEDIAL MENISCUS TEAR OF LEFT KNEE, INITIAL ENCOUNTER: ICD-10-CM

## 2019-07-24 DIAGNOSIS — M23.42 LOOSE BODY IN KNEE, LEFT KNEE: ICD-10-CM

## 2019-07-24 DIAGNOSIS — S76.312A STRAIN OF LEFT HAMSTRING, INITIAL ENCOUNTER: ICD-10-CM

## 2019-11-06 ENCOUNTER — TELEPHONE (OUTPATIENT)
Dept: ORTHOPEDIC SURGERY | Age: 44
End: 2019-11-06

## 2019-11-15 RX ORDER — LEVOTHYROXINE SODIUM 0.07 MG/1
TABLET ORAL
Qty: 30 TABLET | Refills: 0 | Status: SHIPPED | OUTPATIENT
Start: 2019-11-15 | End: 2020-02-10

## 2019-12-05 ENCOUNTER — TELEPHONE (OUTPATIENT)
Dept: ORTHOPEDIC SURGERY | Age: 44
End: 2019-12-05

## 2019-12-05 NOTE — TELEPHONE ENCOUNTER
WC REBUTTAL REQUEST PEER TO PEER NATALIA JOSÉ MD     SCANNED THE Diplopia REQUEST AND PEER TO PEER NOTE FROM DR. NATALIA JOSÉ.     NOTIFIED [address] [place] [address] [address] [facility] does not process these types of requests

## 2019-12-13 ENCOUNTER — OFFICE VISIT (OUTPATIENT)
Dept: ORTHOPEDIC SURGERY | Age: 44
End: 2019-12-13
Payer: COMMERCIAL

## 2019-12-13 VITALS — HEIGHT: 66 IN | BODY MASS INDEX: 35.86 KG/M2 | WEIGHT: 223.11 LBS

## 2019-12-13 DIAGNOSIS — S83.242D ACUTE MEDIAL MENISCUS TEAR OF LEFT KNEE, SUBSEQUENT ENCOUNTER: ICD-10-CM

## 2019-12-13 DIAGNOSIS — S76.312A STRAIN OF LEFT HAMSTRING, INITIAL ENCOUNTER: Primary | ICD-10-CM

## 2019-12-13 PROCEDURE — 99213 OFFICE O/P EST LOW 20 MIN: CPT | Performed by: ORTHOPAEDIC SURGERY

## 2019-12-13 PROCEDURE — L1812 KO ELASTIC W/JOINTS PRE OTS: HCPCS | Performed by: ORTHOPAEDIC SURGERY

## 2019-12-16 ENCOUNTER — TELEPHONE (OUTPATIENT)
Dept: FAMILY MEDICINE CLINIC | Age: 44
End: 2019-12-16

## 2019-12-16 RX ORDER — LEVOTHYROXINE SODIUM 0.07 MG/1
TABLET ORAL
Qty: 30 TABLET | Refills: 1 | OUTPATIENT
Start: 2019-12-16

## 2019-12-19 ENCOUNTER — OFFICE VISIT (OUTPATIENT)
Dept: FAMILY MEDICINE CLINIC | Age: 44
End: 2019-12-19
Payer: COMMERCIAL

## 2019-12-19 VITALS
SYSTOLIC BLOOD PRESSURE: 144 MMHG | WEIGHT: 225 LBS | HEART RATE: 94 BPM | DIASTOLIC BLOOD PRESSURE: 80 MMHG | HEIGHT: 65 IN | OXYGEN SATURATION: 96 % | BODY MASS INDEX: 37.49 KG/M2

## 2019-12-19 DIAGNOSIS — I10 ESSENTIAL HYPERTENSION: Chronic | ICD-10-CM

## 2019-12-19 DIAGNOSIS — E55.9 VITAMIN D DEFICIENCY: ICD-10-CM

## 2019-12-19 DIAGNOSIS — Z13.1 DIABETES MELLITUS SCREENING: ICD-10-CM

## 2019-12-19 DIAGNOSIS — I10 ESSENTIAL HYPERTENSION: Primary | Chronic | ICD-10-CM

## 2019-12-19 DIAGNOSIS — Z12.39 BREAST CANCER SCREENING: ICD-10-CM

## 2019-12-19 DIAGNOSIS — E03.9 HYPOTHYROIDISM, UNSPECIFIED TYPE: ICD-10-CM

## 2019-12-19 DIAGNOSIS — E78.2 MIXED HYPERLIPIDEMIA: Chronic | ICD-10-CM

## 2019-12-19 LAB
A/G RATIO: 1.4 (ref 1.1–2.2)
ALBUMIN SERPL-MCNC: 4.2 G/DL (ref 3.4–5)
ALP BLD-CCNC: 62 U/L (ref 40–129)
ALT SERPL-CCNC: 19 U/L (ref 10–40)
ANION GAP SERPL CALCULATED.3IONS-SCNC: 14 MMOL/L (ref 3–16)
AST SERPL-CCNC: 18 U/L (ref 15–37)
BILIRUB SERPL-MCNC: <0.2 MG/DL (ref 0–1)
BUN BLDV-MCNC: 10 MG/DL (ref 7–20)
CALCIUM SERPL-MCNC: 9.5 MG/DL (ref 8.3–10.6)
CHLORIDE BLD-SCNC: 99 MMOL/L (ref 99–110)
CHOLESTEROL, TOTAL: 185 MG/DL (ref 0–199)
CO2: 28 MMOL/L (ref 21–32)
CREAT SERPL-MCNC: 0.7 MG/DL (ref 0.6–1.1)
GFR AFRICAN AMERICAN: >60
GFR NON-AFRICAN AMERICAN: >60
GLOBULIN: 2.9 G/DL
GLUCOSE BLD-MCNC: 97 MG/DL (ref 70–99)
HCT VFR BLD CALC: 36.3 % (ref 36–48)
HDLC SERPL-MCNC: 41 MG/DL (ref 40–60)
HEMOGLOBIN: 12.1 G/DL (ref 12–16)
LDL CHOLESTEROL CALCULATED: ABNORMAL MG/DL
LDL CHOLESTEROL DIRECT: 110 MG/DL
MCH RBC QN AUTO: 27.2 PG (ref 26–34)
MCHC RBC AUTO-ENTMCNC: 33.5 G/DL (ref 31–36)
MCV RBC AUTO: 81.3 FL (ref 80–100)
PDW BLD-RTO: 14.4 % (ref 12.4–15.4)
PLATELET # BLD: 286 K/UL (ref 135–450)
PMV BLD AUTO: 9.1 FL (ref 5–10.5)
POTASSIUM SERPL-SCNC: 4.3 MMOL/L (ref 3.5–5.1)
RBC # BLD: 4.46 M/UL (ref 4–5.2)
SODIUM BLD-SCNC: 141 MMOL/L (ref 136–145)
TOTAL PROTEIN: 7.1 G/DL (ref 6.4–8.2)
TRIGL SERPL-MCNC: 309 MG/DL (ref 0–150)
TSH REFLEX: 2.07 UIU/ML (ref 0.27–4.2)
VITAMIN D 25-HYDROXY: 22.9 NG/ML
VLDLC SERPL CALC-MCNC: ABNORMAL MG/DL
WBC # BLD: 8.8 K/UL (ref 4–11)

## 2019-12-19 PROCEDURE — 99214 OFFICE O/P EST MOD 30 MIN: CPT | Performed by: NURSE PRACTITIONER

## 2019-12-19 RX ORDER — LOSARTAN POTASSIUM 25 MG/1
25 TABLET ORAL DAILY
Status: ON HOLD | COMMUNITY
Start: 2019-09-23 | End: 2021-02-27 | Stop reason: HOSPADM

## 2019-12-19 RX ORDER — ATENOLOL 50 MG/1
50 TABLET ORAL DAILY
Qty: 30 TABLET | Refills: 5 | Status: SHIPPED | OUTPATIENT
Start: 2019-12-19 | End: 2020-11-02 | Stop reason: SDUPTHER

## 2019-12-19 ASSESSMENT — PATIENT HEALTH QUESTIONNAIRE - PHQ9
1. LITTLE INTEREST OR PLEASURE IN DOING THINGS: 0
SUM OF ALL RESPONSES TO PHQ QUESTIONS 1-9: 0
SUM OF ALL RESPONSES TO PHQ9 QUESTIONS 1 & 2: 0
SUM OF ALL RESPONSES TO PHQ QUESTIONS 1-9: 0
2. FEELING DOWN, DEPRESSED OR HOPELESS: 0

## 2019-12-19 ASSESSMENT — ENCOUNTER SYMPTOMS
COUGH: 0
STRIDOR: 0
WHEEZING: 0
SHORTNESS OF BREATH: 0

## 2019-12-20 LAB
ESTIMATED AVERAGE GLUCOSE: 116.9 MG/DL
HBA1C MFR BLD: 5.7 %

## 2019-12-27 NOTE — TELEPHONE ENCOUNTER
KIM SARKAR I HAD SCANNED INTO MEDIA ON 12/5/19, A COPY OF THE PEER TO REVIEW FORM, FOR DR Rhiannon Silvestre TO READ AND WRITE A SHORT NOTE, REGARDING HER OPINION OF WHY SHE FEELS THE REQUESTED TREATMENT IS MEDICALLY NECESSARY SOLELY AS A RESULT OF THE ALLOWED CONDITION. NOT A PEER TO PEER CALL. SORRY FOR THE CONFUSION.      NOTIFIED [address] [facility]

## 2020-01-06 NOTE — TELEPHONE ENCOUNTER
Dixie Lefort Dr. Gearldine Nordmann never got back to me on what she wanted to do with this. . I believe this would require some type of payment for her to write some form of deposition . .. so I'm passing this on to you because I have no idea what needs to be done as this is not a peer to peer they want a response in writing. Edgar Sue

## 2020-01-10 ENCOUNTER — OFFICE VISIT (OUTPATIENT)
Dept: INTERNAL MEDICINE CLINIC | Age: 45
End: 2020-01-10
Payer: COMMERCIAL

## 2020-01-10 VITALS
BODY MASS INDEX: 36.61 KG/M2 | HEART RATE: 97 BPM | TEMPERATURE: 98.3 F | WEIGHT: 220 LBS | DIASTOLIC BLOOD PRESSURE: 78 MMHG | RESPIRATION RATE: 74 BRPM | SYSTOLIC BLOOD PRESSURE: 130 MMHG

## 2020-01-10 PROBLEM — J01.10 ACUTE NON-RECURRENT FRONTAL SINUSITIS: Status: ACTIVE | Noted: 2017-08-12

## 2020-01-10 LAB
INFLUENZA A ANTIBODY: NORMAL
INFLUENZA B ANTIBODY: NORMAL

## 2020-01-10 PROCEDURE — 87804 INFLUENZA ASSAY W/OPTIC: CPT | Performed by: NURSE PRACTITIONER

## 2020-01-10 PROCEDURE — 99213 OFFICE O/P EST LOW 20 MIN: CPT | Performed by: NURSE PRACTITIONER

## 2020-01-10 RX ORDER — AMOXICILLIN AND CLAVULANATE POTASSIUM 875; 125 MG/1; MG/1
1 TABLET, FILM COATED ORAL 2 TIMES DAILY
Qty: 14 TABLET | Refills: 0 | Status: SHIPPED | OUTPATIENT
Start: 2020-01-10 | End: 2020-01-17

## 2020-01-10 ASSESSMENT — ENCOUNTER SYMPTOMS
SINUS PRESSURE: 1
COUGH: 1
HEARTBURN: 1
SINUS PAIN: 1
SHORTNESS OF BREATH: 1
RHINORRHEA: 1

## 2020-01-31 ENCOUNTER — OFFICE VISIT (OUTPATIENT)
Dept: ORTHOPEDIC SURGERY | Age: 45
End: 2020-01-31
Payer: COMMERCIAL

## 2020-01-31 VITALS — BODY MASS INDEX: 36.66 KG/M2 | WEIGHT: 220.02 LBS | HEIGHT: 65 IN

## 2020-01-31 PROCEDURE — 99213 OFFICE O/P EST LOW 20 MIN: CPT | Performed by: ORTHOPAEDIC SURGERY

## 2020-01-31 NOTE — PROGRESS NOTES
program [facility] S[facility]) and it was checked for errors. It is possible that there are still dictated errors within this office note. If so, please bring any errors to my attention for an addendum. All efforts were made to ensure that this office note is accurate.

## 2020-02-10 RX ORDER — LEVOTHYROXINE SODIUM 0.07 MG/1
TABLET ORAL
Qty: 30 TABLET | Refills: 9 | Status: SHIPPED | OUTPATIENT
Start: 2020-02-10 | End: 2022-01-10 | Stop reason: SDUPTHER

## 2020-02-28 ENCOUNTER — OFFICE VISIT (OUTPATIENT)
Dept: FAMILY MEDICINE CLINIC | Age: 45
End: 2020-02-28
Payer: COMMERCIAL

## 2020-02-28 VITALS
OXYGEN SATURATION: 98 % | BODY MASS INDEX: 36.65 KG/M2 | HEART RATE: 90 BPM | HEIGHT: 65 IN | WEIGHT: 220 LBS | DIASTOLIC BLOOD PRESSURE: 108 MMHG | SYSTOLIC BLOOD PRESSURE: 160 MMHG

## 2020-02-28 PROCEDURE — 99214 OFFICE O/P EST MOD 30 MIN: CPT | Performed by: FAMILY MEDICINE

## 2020-02-28 RX ORDER — PROMETHAZINE HYDROCHLORIDE 25 MG/1
25 TABLET ORAL 4 TIMES DAILY PRN
Qty: 20 TABLET | Refills: 0 | Status: SHIPPED | OUTPATIENT
Start: 2020-02-28 | End: 2020-03-06

## 2020-02-28 RX ORDER — BENZONATATE 100 MG/1
100 CAPSULE ORAL 3 TIMES DAILY PRN
Qty: 30 CAPSULE | Refills: 0 | Status: SHIPPED | OUTPATIENT
Start: 2020-02-28 | End: 2021-02-27

## 2020-02-28 ASSESSMENT — ENCOUNTER SYMPTOMS
RHINORRHEA: 1
EYE REDNESS: 0
CHEST TIGHTNESS: 1
CONSTIPATION: 0
SHORTNESS OF BREATH: 0
VOMITING: 0
NAUSEA: 1
DIARRHEA: 1
COUGH: 1

## 2020-02-28 ASSESSMENT — PATIENT HEALTH QUESTIONNAIRE - PHQ9
2. FEELING DOWN, DEPRESSED OR HOPELESS: 0
SUM OF ALL RESPONSES TO PHQ QUESTIONS 1-9: 0
SUM OF ALL RESPONSES TO PHQ9 QUESTIONS 1 & 2: 0
1. LITTLE INTEREST OR PLEASURE IN DOING THINGS: 0
SUM OF ALL RESPONSES TO PHQ QUESTIONS 1-9: 0

## 2020-02-28 NOTE — PATIENT INSTRUCTIONS
Take 2 cozaar a day for your blood pressure. Increase Fluids, broth, Gatorade, water. Cough medicine 3 times a day as needed for cough. Throat lozenges for sore throat. Rest  Tylenol or ibuprofen for fever, aches. Wash hands often. Cough into elbow, do not cough toward others.

## 2020-02-28 NOTE — PROGRESS NOTES
tablet 5    diclofenac sodium 1 % GEL Apply 2 g topically 4 times daily 2 Tube 3    diclofenac (VOLTAREN) 50 MG EC tablet TAKE 1 TABLET BY MOUTH TWO TIMES A DAY WITH MEALS 60 tablet 2    ibuprofen (ADVIL;MOTRIN) 800 MG tablet Take 1 tablet by mouth every 8 hours as needed for Pain or Fever 20 tablet 0    ferrous sulfate 325 (65 Fe) MG tablet Take 325 mg by mouth daily (with breakfast)      vitamin D3 (CHOLECALCIFEROL) 400 units TABS tablet Take 2 tablets by mouth daily 30 tablet 0    Ascorbic Acid (VITAMIN C) 500 MG tablet Take 500 mg by mouth daily      atorvastatin (LIPITOR) 80 MG tablet Take by mouth daily       aspirin 81 MG tablet Take 81 mg by mouth daily      therapeutic multivitamin-minerals (THERAGRAN-M) tablet Take 1 tablet by mouth daily. No current facility-administered medications for this visit. Vitals:    02/28/20 1247   BP: (!) 160/108   Pulse:    SpO2:          Physical Exam  Physical Exam  Vitals signs and nursing note reviewed. Constitutional:       General: She is not in acute distress. Appearance: Normal appearance. She is well-developed. She is not ill-appearing, toxic-appearing or diaphoretic. HENT:      Head: Normocephalic and atraumatic. Right Ear: Tympanic membrane normal.      Left Ear: Tympanic membrane normal.      Nose: Congestion and rhinorrhea present. Mouth/Throat:      Pharynx: No oropharyngeal exudate. Comments: No lymphadenopathy  Eyes:      General: No scleral icterus. Right eye: No discharge. Left eye: No discharge. Conjunctiva/sclera: Conjunctivae normal.   Neck:      Musculoskeletal: Neck supple. No neck rigidity. Cardiovascular:      Rate and Rhythm: Normal rate and regular rhythm. Heart sounds: Normal heart sounds. Pulmonary:      Effort: Pulmonary effort is normal. No respiratory distress. Breath sounds: Normal breath sounds. No stridor. No wheezing, rhonchi or rales.       Comments: Frequent

## 2020-03-13 ENCOUNTER — OFFICE VISIT (OUTPATIENT)
Dept: ORTHOPEDIC SURGERY | Age: 45
End: 2020-03-13
Payer: COMMERCIAL

## 2020-03-13 VITALS — BODY MASS INDEX: 36.65 KG/M2 | HEIGHT: 65 IN | WEIGHT: 220 LBS

## 2020-03-13 PROCEDURE — 99213 OFFICE O/P EST LOW 20 MIN: CPT | Performed by: PHYSICIAN ASSISTANT

## 2020-03-13 RX ORDER — MELOXICAM 15 MG/1
15 TABLET ORAL DAILY
Qty: 30 TABLET | Refills: 2 | Status: SHIPPED | OUTPATIENT
Start: 2020-03-13 | End: 2021-01-11

## 2020-03-13 NOTE — PROGRESS NOTES
time.    Non-steroidal anti-inflammatories medications (NSAIDs) can be used to assist with pain control and to reduce inflammatory changes. These medications may be over-the-counter or prescribed. We discussed taking the NSAID properly and the precautions. The patient understands that this medication may potentially interfere with other medications. Patient was also instructed to immediately discontinue the medication is there is any possible complication. Tesha Atkins was instructed to call the office if her symptoms worsen or if new symptoms appear prior to the next scheduled visit. She is specifically instructed to contact the office between now and schedule appointment if she has concerns related to her condition or if she needs assistance in scheduling any above tests. She is welcome to call for an appointment sooner if she has any additional concerns or questions. This dictation was performed with a verbal recognition program (DRAGON) and it was checked for errors. It is possible that there are still dictated errors within this office note. If so, please bring any errors to my attention for an addendum. All efforts were made to ensure that this office note is accurate.

## 2020-03-31 ENCOUNTER — TELEPHONE (OUTPATIENT)
Dept: PULMONOLOGY | Age: 45
End: 2020-03-31

## 2020-03-31 NOTE — TELEPHONE ENCOUNTER
Patient cancelled appointment on 4/7/20 with Dr. Shabbir Keller for 12 month ABHILASH. 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 COVID19    Patient did reschedule appointment. Appointment rescheduled for 6/10/20. Last OV 4/17/19:    Assessment:       · ABHILASH -  CPAP 8-16 cmH2O. Acceptable compliance and good efficacy upon review today. · DOT -       Plan:    · Advised to use CPAP 6-8 hrs at night and during naps.   · Patient has been advised: no driving while sleepy; weight loss recommended; systemic benefits of CPAP therapy have been discussed.       Follow up: 1 year

## 2020-06-09 ENCOUNTER — TELEPHONE (OUTPATIENT)
Dept: PULMONOLOGY | Age: 45
End: 2020-06-09

## 2020-06-09 NOTE — TELEPHONE ENCOUNTER
Patient cancelled appointment on 6/9/20 with Dr Pastor Leal for 1 yr sleep fu. Reason: Patient has cancelled appointment based on the CDC recommended COVID-19 pandemic precautions. Patient declined to scheduled VV or tele visit at this time    Patient did not reschedule appointment. Assessment: 4/17/19      · ABHILASH -  CPAP 8-16 cmH2O. Acceptable compliance and good efficacy upon review today. · DOT -       Plan:    · Advised to use CPAP 6-8 hrs at night and during naps.   · Patient has been advised: no driving while sleepy; weight loss recommended; systemic benefits of CPAP therapy have been discussed.       Follow up: 1 year

## 2020-06-23 NOTE — PROGRESS NOTES
Subjective:      Patient ID: Mj Laura is a 38 y.o. female. Sussy is here with complaints of fever, cough, chest congestion, nausea, diarrhea, headache, body aches and chills. Symptoms started Friday night and felt like a head cold. She felt better Saturday but then felt increasingly worse on Sunday. She has nausea and diarrhea but denies emesis. She has had a fever reaching 101 at its max. Her sore throat is the symptoms that is most significant at this time. She has pain with swallowing. Denies being around anyone ill. Pharyngitis   This is a new problem. The current episode started in the past 7 days (2 days ago). The problem occurs constantly. The problem has been rapidly worsening. Associated symptoms include a change in bowel habit, chills, congestion, coughing, a fever, headaches, myalgias, nausea, a sore throat and swollen glands. Pertinent negatives include no abdominal pain, anorexia, arthralgias, chest pain, diaphoresis, fatigue, joint swelling, neck pain, numbness, rash, urinary symptoms, vertigo, visual change, vomiting or weakness. The symptoms are aggravated by swallowing and sneezing. Treatments tried: Tylenol cold and flu. The treatment provided mild relief. Review of Systems   Constitutional: Positive for chills and fever. Negative for diaphoresis and fatigue. HENT: Positive for congestion, rhinorrhea, sinus pressure, sneezing and sore throat. Negative for ear pain, facial swelling and postnasal drip. Eyes:        Watery Eyes   Respiratory: Positive for cough, chest tightness and shortness of breath. Negative for wheezing. Cardiovascular: Negative for chest pain and palpitations. Gastrointestinal: Positive for change in bowel habit, diarrhea and nausea. Negative for abdominal pain, anorexia and vomiting. Sunday only   Musculoskeletal: Positive for myalgias. Negative for arthralgias, joint swelling, neck pain and neck stiffness.         Entire body feels Poor (<50%)

## 2020-07-13 ENCOUNTER — OFFICE VISIT (OUTPATIENT)
Dept: ORTHOPEDIC SURGERY | Age: 45
End: 2020-07-13
Payer: COMMERCIAL

## 2020-07-13 VITALS — HEIGHT: 66 IN | WEIGHT: 214 LBS | BODY MASS INDEX: 34.39 KG/M2

## 2020-07-13 PROCEDURE — 99213 OFFICE O/P EST LOW 20 MIN: CPT | Performed by: ORTHOPAEDIC SURGERY

## 2020-07-13 NOTE — PROGRESS NOTES
Chief Complaint  Follow-up (ck l knee)    Post op left knee arthroscopy partial medial meniscectomy and chondroplasty  DOS: 3/6/19       History of Present Illness:  Emmanuel Fish is a 53 y.o. female  Here status post left knee arthroscopy partial medial meniscectomy and chondroplasty. She continues to have 8 out of 10 medial hamstring pain. States this is the same pain she had before surgery, denies any new injury, patient states the pain has never resolved. I last saw her in February when I ordered an MRI, soon after that visit I was on maternity leave and patient reviewed the MRI results with 1 of my colleagues. They submitted for further physical therapy and per the patient's  this was denied, however I do not see evidence of denial or approval through our [employer] system. Patient states on June 17 she saw Workmen's Comp. doctor Jorge who recommended water aerobics, dry needling and physical therapy. PHYSICAL EXAMINATION:    Ht 5' 6\" (1.676 m)   Wt 214 lb (97.1 kg)   BMI 34.54 kg/m²     Pain Assessment:  Pain Assessment  Location of Pain: Knee  Location Modifiers: Left  Severity of Pain: 8  Quality of Pain: Aching, Dull  Duration of Pain: Persistent  Frequency of Pain: Constant  Aggravating Factors: Bending, Stretching, Straightening, Exercise, Kneeling, Squatting, Standing, Walking, Stairs  Limiting Behavior: Yes  Result of Injury: No  Work-Related Injury: No  Are there other pain locations you wish to document?: No    Knee Examination:  Patient is awake, alert, and in no acute distress. Patient is ambulating with an economy hinged brace today, distal neurovascular exam is intact. Incisions are well-healed, no effusion at today's visit. Continued tenderness along the medial hamstrings, mild tenderness along the medial joint line. Tolerates range of motion 0 to 130 degrees and is able to perform straight leg raise with 5 out of 5 flexion extension strength.           None  Diagnostic Test Findings: No new xrays taken today    Initial MRI:  MRI images of the left knee were reviewed and show:  3-4 cm trizonal complex tear of the medial meniscus  3 x 2.5 x 3 mm synovial chondral body is noted posterior medially  Intermediate to high-grade chondromalacia in the patellofemoral and medial compartments  Mucoid degeneration of the ACL with no signs of tear or insufficiency  Small enchondroma in the distal anterior femoral metaphysis    MRI left knee from February 2020 was reviewed and shows:  Tricompartmental osteoarthritis most prominent in the medial tibiofemoral compartment. Myxoid degeneration of the posterior horn medial meniscus without tear, continued 3 to 4 mm synovial body the posterior medial compartment. Degenerative partial tear of the ACL without complete rupture. Moderate joint effusion. Reactive marrow edema and subchondral cystic change is present in the medial tibial femoral compartment. No evidence of osteonecrosis. Assessment: Continued left medial hamstring tightness and sensitivity from fall in 2018. status post left knee arthroscopy with partial medial meniscectomy and chondroplasty    Impression:  Encounter Diagnoses   Name Primary?  Strain of left hamstring, initial encounter Yes    Loose body in knee, left knee     Acute medial meniscus tear of left knee, sequela     S/P arthroscopic partial medial meniscectomy          Treatment Plan: I again reviewed most recent MRI results with her, there are no new findings, patient does have chondromalacia consistent with osteoarthritis tricompartmentally. Continue to recommend physical therapy with dry needling and water aerobics. We submitted for approval once again. She can continue to work without restrictions however patient is laid off due to [facility] pandemic at this time. Alice Trevino      This dictation was performed with a verbal recognition program (DRAGON) and it was checked for errors.  It is possible that

## 2020-07-22 ENCOUNTER — TELEPHONE (OUTPATIENT)
Dept: ORTHOPEDIC SURGERY | Age: 45
End: 2020-07-22

## 2020-07-22 NOTE — TELEPHONE ENCOUNTER
Called & talked to the patient and informed her that her C9 for PT, dry needling and aquatic therapy, was denied. The patient understood and I told her there was nothing more that Doctor Melia Tapia could do for her at this point, patient asked for a copy of her last progress note from doctor Melia Tapia, patient was informed she would have to go through medical records to obtain that note. Patient understood and agreed.

## 2020-09-03 ENCOUNTER — TELEPHONE (OUTPATIENT)
Dept: ORTHOPEDIC SURGERY | Age: 45
End: 2020-09-03

## 2020-09-08 ENCOUNTER — TELEPHONE (OUTPATIENT)
Dept: ORTHOPEDIC SURGERY | Age: 45
End: 2020-09-08

## 2020-09-08 NOTE — TELEPHONE ENCOUNTER
SCANNED IN A C30 FOR DR Edd Zepeda. The  is requesting a specific diagnosis regarding the substantial aggravation pre existing left knee tri compartmental OA and her opinion as to the casual relationship between the injury and the diagnosis substantial aggravation  pre existing left knee tri compartmental OA.

## 2020-11-02 RX ORDER — ATENOLOL 50 MG/1
50 TABLET ORAL DAILY
Qty: 30 TABLET | Refills: 1 | Status: SHIPPED | OUTPATIENT
Start: 2020-11-02 | End: 2022-01-10 | Stop reason: SDUPTHER

## 2020-11-10 ENCOUNTER — TELEPHONE (OUTPATIENT)
Dept: ORTHOPEDIC SURGERY | Age: 45
End: 2020-11-10

## 2020-11-10 NOTE — TELEPHONE ENCOUNTER
SCANNED IN TO MEDIA A LETTER FROM THE [employer]. THERE REQUESTING CLARIFICATION TO SECTION 13 ON THE C30  ON THE FORM AND ARE ASKING IF YOU WILL ADD THAT YOU FEEL THAT THE PRE- EXISTING CHONDROMALACIA WAS SUBSTANTIALLY AGGRAVATED AS A RESULT OF HER INJURY.      IF DR Stephie Ramirez FEELS THAT THE INJURY CAUSED A SUBSTANTIAL AGGRAVATION OF THE CHONDROMALACIA WE ARE ASKING THAT YOU ADD TO SECTION 13 THAT  \"THE CHONDROMALACIA  WAS SUBSTANTIALLY AGGRAVATED\"

## 2020-11-11 ENCOUNTER — TELEPHONE (OUTPATIENT)
Dept: ORTHOPEDIC SURGERY | Age: 45
End: 2020-11-11

## 2021-01-11 RX ORDER — MELOXICAM 15 MG/1
15 TABLET ORAL DAILY
Qty: 90 TABLET | Refills: 3 | Status: ON HOLD | OUTPATIENT
Start: 2021-01-11 | End: 2022-02-24 | Stop reason: HOSPADM

## 2021-02-26 ENCOUNTER — HOSPITAL ENCOUNTER (OUTPATIENT)
Age: 46
Setting detail: OBSERVATION
Discharge: HOME | End: 2021-02-27
Attending: EMERGENCY MEDICINE | Admitting: INTERNAL MEDICINE
Payer: COMMERCIAL

## 2021-02-26 ENCOUNTER — NURSE TRIAGE (OUTPATIENT)
Dept: OTHER | Facility: CLINIC | Age: 46
End: 2021-02-26

## 2021-02-26 ENCOUNTER — APPOINTMENT (OUTPATIENT)
Dept: GENERAL RADIOLOGY | Age: 46
End: 2021-02-26

## 2021-02-26 DIAGNOSIS — I10 ESSENTIAL HYPERTENSION: Chronic | ICD-10-CM

## 2021-02-26 DIAGNOSIS — R07.9 CHEST PAIN, UNSPECIFIED TYPE: Primary | ICD-10-CM

## 2021-02-26 LAB
A/G RATIO: 1.3 (ref 1.1–2.2)
ALBUMIN SERPL-MCNC: 4.5 G/DL (ref 3.4–5)
ALP BLD-CCNC: 67 U/L (ref 40–129)
ALT SERPL-CCNC: 22 U/L (ref 10–40)
ANION GAP SERPL CALCULATED.3IONS-SCNC: 10 MMOL/L (ref 3–16)
AST SERPL-CCNC: 22 U/L (ref 15–37)
BASOPHILS ABSOLUTE: 0.1 K/UL (ref 0–0.2)
BASOPHILS RELATIVE PERCENT: 0.9 %
BILIRUB SERPL-MCNC: <0.2 MG/DL (ref 0–1)
BUN BLDV-MCNC: 14 MG/DL (ref 7–20)
CALCIUM SERPL-MCNC: 9.7 MG/DL (ref 8.3–10.6)
CHLORIDE BLD-SCNC: 99 MMOL/L (ref 99–110)
CO2: 29 MMOL/L (ref 21–32)
CREAT SERPL-MCNC: 0.8 MG/DL (ref 0.6–1.1)
EKG ATRIAL RATE: 85 BPM
EKG DIAGNOSIS: NORMAL
EKG P AXIS: 52 DEGREES
EKG P-R INTERVAL: 140 MS
EKG Q-T INTERVAL: 348 MS
EKG QRS DURATION: 80 MS
EKG QTC CALCULATION (BAZETT): 414 MS
EKG R AXIS: 33 DEGREES
EKG T AXIS: 53 DEGREES
EKG VENTRICULAR RATE: 85 BPM
EOSINOPHILS ABSOLUTE: 0.4 K/UL (ref 0–0.6)
EOSINOPHILS RELATIVE PERCENT: 3.8 %
GFR AFRICAN AMERICAN: >60
GFR NON-AFRICAN AMERICAN: >60
GLOBULIN: 3.4 G/DL
GLUCOSE BLD-MCNC: 101 MG/DL (ref 70–99)
HCT VFR BLD CALC: 35.6 % (ref 36–48)
HEMOGLOBIN: 11.9 G/DL (ref 12–16)
LYMPHOCYTES ABSOLUTE: 2.3 K/UL (ref 1–5.1)
LYMPHOCYTES RELATIVE PERCENT: 21.9 %
MCH RBC QN AUTO: 27.1 PG (ref 26–34)
MCHC RBC AUTO-ENTMCNC: 33.5 G/DL (ref 31–36)
MCV RBC AUTO: 80.7 FL (ref 80–100)
MONOCYTES ABSOLUTE: 0.6 K/UL (ref 0–1.3)
MONOCYTES RELATIVE PERCENT: 6.1 %
NEUTROPHILS ABSOLUTE: 7.1 K/UL (ref 1.7–7.7)
NEUTROPHILS RELATIVE PERCENT: 67.3 %
PDW BLD-RTO: 14 % (ref 12.4–15.4)
PLATELET # BLD: 318 K/UL (ref 135–450)
PMV BLD AUTO: 8.6 FL (ref 5–10.5)
POTASSIUM SERPL-SCNC: 3.9 MMOL/L (ref 3.5–5.1)
RBC # BLD: 4.41 M/UL (ref 4–5.2)
SODIUM BLD-SCNC: 138 MMOL/L (ref 136–145)
TOTAL PROTEIN: 7.9 G/DL (ref 6.4–8.2)
TROPONIN: <0.01 NG/ML
WBC # BLD: 10.6 K/UL (ref 4–11)

## 2021-02-26 PROCEDURE — 71045 X-RAY EXAM CHEST 1 VIEW: CPT

## 2021-02-26 PROCEDURE — 96372 THER/PROPH/DIAG INJ SC/IM: CPT

## 2021-02-26 PROCEDURE — 36415 COLL VENOUS BLD VENIPUNCTURE: CPT

## 2021-02-26 PROCEDURE — 84484 ASSAY OF TROPONIN QUANT: CPT

## 2021-02-26 PROCEDURE — G0378 HOSPITAL OBSERVATION PER HR: HCPCS

## 2021-02-26 PROCEDURE — 6360000002 HC RX W HCPCS: Performed by: INTERNAL MEDICINE

## 2021-02-26 PROCEDURE — 2580000003 HC RX 258: Performed by: INTERNAL MEDICINE

## 2021-02-26 PROCEDURE — 80053 COMPREHEN METABOLIC PANEL: CPT

## 2021-02-26 PROCEDURE — 93005 ELECTROCARDIOGRAM TRACING: CPT | Performed by: EMERGENCY MEDICINE

## 2021-02-26 PROCEDURE — 93010 ELECTROCARDIOGRAM REPORT: CPT | Performed by: INTERNAL MEDICINE

## 2021-02-26 PROCEDURE — 85025 COMPLETE CBC W/AUTO DIFF WBC: CPT

## 2021-02-26 PROCEDURE — 99283 EMERGENCY DEPT VISIT LOW MDM: CPT

## 2021-02-26 PROCEDURE — 6370000000 HC RX 637 (ALT 250 FOR IP): Performed by: INTERNAL MEDICINE

## 2021-02-26 PROCEDURE — 6370000000 HC RX 637 (ALT 250 FOR IP): Performed by: NURSE PRACTITIONER

## 2021-02-26 RX ORDER — ASPIRIN 81 MG/1
81 TABLET, CHEWABLE ORAL DAILY
Status: DISCONTINUED | OUTPATIENT
Start: 2021-02-27 | End: 2021-02-26 | Stop reason: SDUPTHER

## 2021-02-26 RX ORDER — ASPIRIN 81 MG/1
81 TABLET, CHEWABLE ORAL DAILY
Status: DISCONTINUED | OUTPATIENT
Start: 2021-02-26 | End: 2021-02-27 | Stop reason: HOSPADM

## 2021-02-26 RX ORDER — NITROGLYCERIN 0.4 MG/1
0.4 TABLET SUBLINGUAL EVERY 5 MIN PRN
Status: DISCONTINUED | OUTPATIENT
Start: 2021-02-26 | End: 2021-02-27 | Stop reason: HOSPADM

## 2021-02-26 RX ORDER — PROMETHAZINE HYDROCHLORIDE 25 MG/1
12.5 TABLET ORAL EVERY 6 HOURS PRN
Status: DISCONTINUED | OUTPATIENT
Start: 2021-02-26 | End: 2021-02-27 | Stop reason: HOSPADM

## 2021-02-26 RX ORDER — SODIUM CHLORIDE 0.9 % (FLUSH) 0.9 %
10 SYRINGE (ML) INJECTION EVERY 12 HOURS SCHEDULED
Status: DISCONTINUED | OUTPATIENT
Start: 2021-02-26 | End: 2021-02-27 | Stop reason: HOSPADM

## 2021-02-26 RX ORDER — SODIUM CHLORIDE 0.9 % (FLUSH) 0.9 %
10 SYRINGE (ML) INJECTION PRN
Status: DISCONTINUED | OUTPATIENT
Start: 2021-02-26 | End: 2021-02-27 | Stop reason: HOSPADM

## 2021-02-26 RX ORDER — ATORVASTATIN CALCIUM 80 MG/1
80 TABLET, FILM COATED ORAL NIGHTLY
Status: DISCONTINUED | OUTPATIENT
Start: 2021-02-26 | End: 2021-02-27 | Stop reason: HOSPADM

## 2021-02-26 RX ORDER — ATORVASTATIN CALCIUM 40 MG/1
40 TABLET, FILM COATED ORAL NIGHTLY
Status: DISCONTINUED | OUTPATIENT
Start: 2021-02-26 | End: 2021-02-26 | Stop reason: SDUPTHER

## 2021-02-26 RX ORDER — HYDRALAZINE HYDROCHLORIDE 20 MG/ML
10 INJECTION INTRAMUSCULAR; INTRAVENOUS EVERY 6 HOURS PRN
Status: DISCONTINUED | OUTPATIENT
Start: 2021-02-26 | End: 2021-02-27 | Stop reason: HOSPADM

## 2021-02-26 RX ORDER — ATENOLOL 25 MG/1
50 TABLET ORAL DAILY
Status: DISCONTINUED | OUTPATIENT
Start: 2021-02-26 | End: 2021-02-27 | Stop reason: HOSPADM

## 2021-02-26 RX ORDER — LEVOTHYROXINE SODIUM 0.03 MG/1
75 TABLET ORAL DAILY
Status: DISCONTINUED | OUTPATIENT
Start: 2021-02-26 | End: 2021-02-27 | Stop reason: HOSPADM

## 2021-02-26 RX ORDER — LOSARTAN POTASSIUM 25 MG/1
25 TABLET ORAL DAILY
Status: DISCONTINUED | OUTPATIENT
Start: 2021-02-26 | End: 2021-02-27

## 2021-02-26 RX ORDER — ASPIRIN 81 MG/1
260 TABLET, CHEWABLE ORAL ONCE
Status: COMPLETED | OUTPATIENT
Start: 2021-02-26 | End: 2021-02-26

## 2021-02-26 RX ORDER — ACETAMINOPHEN 650 MG/1
650 SUPPOSITORY RECTAL EVERY 6 HOURS PRN
Status: DISCONTINUED | OUTPATIENT
Start: 2021-02-26 | End: 2021-02-27 | Stop reason: HOSPADM

## 2021-02-26 RX ORDER — ONDANSETRON 2 MG/ML
4 INJECTION INTRAMUSCULAR; INTRAVENOUS EVERY 6 HOURS PRN
Status: DISCONTINUED | OUTPATIENT
Start: 2021-02-26 | End: 2021-02-27 | Stop reason: HOSPADM

## 2021-02-26 RX ORDER — POLYETHYLENE GLYCOL 3350 17 G/17G
17 POWDER, FOR SOLUTION ORAL DAILY PRN
Status: DISCONTINUED | OUTPATIENT
Start: 2021-02-26 | End: 2021-02-27 | Stop reason: HOSPADM

## 2021-02-26 RX ORDER — ACETAMINOPHEN 325 MG/1
650 TABLET ORAL EVERY 6 HOURS PRN
Status: DISCONTINUED | OUTPATIENT
Start: 2021-02-26 | End: 2021-02-27 | Stop reason: HOSPADM

## 2021-02-26 RX ADMIN — ATENOLOL 50 MG: 25 TABLET ORAL at 19:51

## 2021-02-26 RX ADMIN — ASPIRIN 243 MG: 81 TABLET, CHEWABLE ORAL at 17:07

## 2021-02-26 RX ADMIN — Medication 10 ML: at 21:35

## 2021-02-26 RX ADMIN — ENOXAPARIN SODIUM 40 MG: 40 INJECTION SUBCUTANEOUS at 21:35

## 2021-02-26 RX ADMIN — LOSARTAN POTASSIUM 25 MG: 25 TABLET, FILM COATED ORAL at 19:51

## 2021-02-26 ASSESSMENT — PAIN SCALES - GENERAL: PAINLEVEL_OUTOF10: 6

## 2021-02-26 NOTE — ED PROVIDER NOTES
EMERGENCY DEPARTMENT ENCOUNTER      This patient was seen and evaluated by the attending physician. Pt Name: Larissa Ramos  MRN: [id number]  [place] 1975  Date of evaluation: 2/26/2021  Provider: ALIYAH TothC  PCP: Christine Driver DO  ED Attending: Swetha Trejo MD    History provided by the patient. CHIEF COMPLAINT:     Chief Complaint   Patient presents with    Chest Pain     cp radiates into her back mid sternum, cardiac hx.  Shortness of Breath    Back Pain       HISTORY OF PRESENT ILLNESS:      Larissa Ramos is a 50 y.o. female who presents [facility]  ED with complaints of chest pain. Patient states that she developed chest pain today that radiates between her shoulder blades, she describes it as a pressure. States that she has shortness of breath with that  Patient states that she had a heart attack about 4 years ago and this feels similar, she states that she does not get chest pain on a regular basis. Patient has 1 coronary stent. She has a history of hyperlipidemia, hypertension, previous smoker. She is here for further evaluation. Location:chest  Quality:pressure  Severity:-  Duration:today  Modifying factors:none noted    Nursing Notes were reviewed     REVIEW OF SYSTEMS:     Review of Systems  All systems, atotal of 10, are reviewed and negative except for those that were just noted in history present illness. PAST MEDICAL HISTORY:     Past Medical History:   Diagnosis Date    CAD (coronary artery disease)     GERD (gastroesophageal reflux disease) 6/6/2018    Heart attack ([address] 11/19/2016    Hyperlipidemia 12/29/2016    Hypertension     Obesity (BMI 30-39. 9) 8/12/2017         SURGICAL HISTORY:      Past Surgical History:   Procedure Laterality Date    APPENDECTOMY      CORONARY ANGIOPLASTY WITH STENT PLACEMENT      KNEE ARTHROSCOPY      KNEE ARTHROSCOPY Left 03/06/2019    medial menisectomy    KNEE ARTHROSCOPY Left 3/6/2019    LEFT KNEE VIDEO ARTHROSCOPY, PARTIAL MEDIAL MENISCECTOMY, CHONDROPLASTY performed by Micah Allan DO at [address] [place] [address]      SPINE SURGERY      neck    TONSILLECTOMY           CURRENT MEDICATIONS:       Previous Medications    ASCORBIC ACID (VITAMIN C) 500 MG TABLET    Take 500 mg by mouth daily    ASPIRIN 81 MG TABLET    Take 81 mg by mouth daily    ATENOLOL (TENORMIN) 50 MG TABLET    Take 1 tablet by mouth daily    ATORVASTATIN (LIPITOR) 80 MG TABLET    Take by mouth daily     BENZONATATE (TESSALON PERLES) 100 MG CAPSULE    Take 1 capsule by mouth 3 times daily as needed for Cough    DICLOFENAC (VOLTAREN) 50 MG EC TABLET    TAKE 1 TABLET BY MOUTH TWO TIMES A DAY WITH MEALS    DICLOFENAC SODIUM 1 % GEL    Apply 2 g topically 4 times daily    DM-DOXYLAMINE-ACETAMINOPHEN (NYQUIL COLD & FLU PO)    Take by mouth    FERROUS SULFATE 325 (65 FE) MG TABLET    Take 325 mg by mouth daily (with breakfast)    IBUPROFEN (ADVIL;MOTRIN) 800 MG TABLET    Take 1 tablet by mouth every 8 hours as needed for Pain or Fever    LEVOTHYROXINE (SYNTHROID) 75 MCG TABLET    TAKE 1 TABLET BY MOUTH ONE TIME A DAY     LOSARTAN (COZAAR) 25 MG TABLET    Take 25 mg by mouth daily    MELOXICAM (MOBIC) 15 MG TABLET    TAKE 1 TABLET BY MOUTH  DAILY    THERAPEUTIC MULTIVITAMIN-MINERALS (THERAGRAN-M) TABLET    Take 1 tablet by mouth daily. VITAMIN D3 (CHOLECALCIFEROL) 400 UNITS TABS TABLET    Take 2 tablets by mouth daily         ALLERGIES:    Patient has no known allergies.     FAMILY HISTORY:       Family History   Problem Relation Age of Onset    Heart Disease Mother     High Blood Pressure Mother     Heart Disease Father 54    High Blood Pressure Father           SOCIAL HISTORY:       Social History     Socioeconomic History    Marital status:      Spouse name: None    Number of children: None    Years of education: None    Highest education level: None Occupational History    None   Social Needs    Financial resource strain: None    Food insecurity     Worry: None     Inability: None    Transportation needs     Medical: None     Non-medical: None   Tobacco Use    Smoking status: Former Smoker     Packs/day: 0.50     Years: 10.00     Pack years: 5.00     Types: Cigarettes     Quit date: 2016     Years since quittin.3    Smokeless tobacco: Never Used   Substance and Sexual Activity    Alcohol use: Not Currently     Comment: occasional    Drug use: No    Sexual activity: Yes     Partners: Male   Lifestyle    Physical activity     Days per week: None     Minutes per session: None    Stress: None   Relationships    Social connections     Talks on phone: None     Gets together: None     Attends Yazidi service: None     Active member of club or organization: None     Attends meetings of clubs or organizations: None     Relationship status: None    Intimate partner violence     Fear of current or ex partner: None     Emotionally abused: None     Physically abused: None     Forced sexual activity: None   Other Topics Concern    None   Social History Narrative    ** Merged History Encounter **            SCREENINGS:            PHYSICAL EXAM:       ED Triage Vitals   BP Temp Temp Source Pulse Resp SpO2 Height Weight   21 1629 21 1629 21 1629 21 1626 21 1629 21 1626 21 1629 21 1629   (!) 162/99 99 °F (37.2 °C) Oral 110 16 95 % 5' 6\" (1.676 m) 215 lb (97.5 kg)       Physical Exam    CONSTITUTIONAL: Awake and alert. Cooperative. Well-developed. Well-nourished. Vitals:    21 1626 21 1629   BP:  (!) 162/99   Pulse: 110 89   Resp:  16   Temp:  99 °F (37.2 °C)   TempSrc:  Oral   SpO2: 95%    Weight:  215 lb (97.5 kg)   Height:  5' 6\" (1.676 m)     HENT: Normocephalic. Atraumatic. External ears normal, without discharge. TMs clear bilaterally. No nasal discharge. Oropharynx clear, no erythema. Mucous membranes moist.  EYES: Conjunctiva non-injected, no lid abnormalities noted. No scleral icterus. PERRL. EOM's grossly intact. Anterior chambers clear. NECK: Supple. Normal ROM. No meningismus. No thyroid tenderness or swelling noted. CARDIOVASCULAR: RRR. No Murmer. PULMONARY/CHEST WALL: Effort normal. No tachypnea. Lungs clear to ausculation. ABDOMEN: Normal BS. Soft. Nondistended. No tenderness to palpate. No guarding. No hernias noted. No splenomegaly. Back: Spine is midline. No ecchymosis. No crepitus on palpation. No obvious subluxation of vertebral column. No saddle anesthesia or evidence of cauda equina. /ANORECTAL: Not assessed  MUSKULOSKELETAL: Normal ROM. No acute deformities. No edema. No tenderness to palpate. SKIN: Warm and dry. NEUROLOGICAL:  GCS 15. CN II-XII grossly intact. Strength is 5/5 in allextremities and sensation is intact. PSYCHIATRIC: Normal affect, normal insight and judgement. Alert andoriented x 3.         DIAGNOSTIC RESULTS:     LABS:    Results for orders placed or performed during the hospital encounter of 02/26/21   CBC Auto Differential   Result Value Ref Range    WBC 10.6 4.0 - 11.0 K/uL    RBC 4.41 4.00 - 5.20 M/uL    Hemoglobin 11.9 (L) 12.0 - 16.0 g/dL    Hematocrit 35.6 (L) 36.0 - 48.0 %    MCV 80.7 80.0 - 100.0 fL    MCH 27.1 26.0 - 34.0 pg    MCHC 33.5 31.0 - 36.0 g/dL    RDW 14.0 12.4 - 15.4 %    Platelets 912 318 - 155 K/uL    MPV 8.6 5.0 - 10.5 fL    Neutrophils % 67.3 %    Lymphocytes % 21.9 %    Monocytes % 6.1 %    Eosinophils % 3.8 %    Basophils % 0.9 %    Neutrophils Absolute 7.1 1.7 - 7.7 K/uL    Lymphocytes Absolute 2.3 1.0 - 5.1 K/uL    Monocytes Absolute 0.6 0.0 - 1.3 K/uL    Eosinophils Absolute 0.4 0.0 - 0.6 K/uL    Basophils Absolute 0.1 0.0 - 0.2 K/uL   Comprehensive Metabolic Panel   Result Value Ref Range    Sodium 138 136 - 145 mmol/L    Potassium 3.9 3.5 - 5.1 mmol/L    Chloride 99 99 - 110 mmol/L    CO2 29 21 - 32 mmol/L    Anion Gap 10 3 - 16    Glucose 101 (H) 70 - 99 mg/dL    BUN 14 7 - 20 mg/dL    CREATININE 0.8 0.6 - 1.1 mg/dL    GFR Non-African American >60 >60    GFR African American >60 >60    Calcium 9.7 8.3 - 10.6 mg/dL    Total Protein 7.9 6.4 - 8.2 g/dL    Albumin 4.5 3.4 - 5.0 g/dL    Albumin/Globulin Ratio 1.3 1.1 - 2.2    Total Bilirubin <0.2 0.0 - 1.0 mg/dL    Alkaline Phosphatase 67 40 - 129 U/L    ALT 22 10 - 40 U/L    AST 22 15 - 37 U/L    Globulin 3.4 g/dL   Troponin   Result Value Ref Range    Troponin <0.01 <0.01 ng/mL   EKG 12 Lead   Result Value Ref Range    Ventricular Rate 85 BPM    Atrial Rate 85 BPM    P-R Interval 140 ms    QRS Duration 80 ms    Q-T Interval 348 ms    QTc Calculation (Bazett) 414 ms    P Axis 52 degrees    R Axis 33 degrees    T Axis 53 degrees    Diagnosis       Normal sinus rhythmNormal ECGWhen compared with ECG of 22-SEP-2018 03:06,No significant change was found         RADIOLOGY:  All x-ray studies are viewed/reviewedby me. Formal interpretations per the radiologist are as follows:      XR CHEST PORTABLE   Final Result   No acute cardiopulmonary disease. EKG:  See EKG interpretation by an attending [id number]:   N/A    CRITICAL CARE TIME:   N/A    CONSULTS:  None      EMERGENCYDEPARTMENT COURSE and DIFFERENTIAL DIAGNOSIS/MDM:   Vitals:    Vitals:    02/26/21 1626 02/26/21 1629   BP:  (!) 162/99   Pulse: 110 89   Resp:  16   Temp:  99 °F (37.2 °C)   TempSrc:  Oral   SpO2: 95%    Weight:  215 lb (97.5 kg)   Height:  5' 6\" (1.676 m)       Patient was given the following medications:  Medications   aspirin chewable tablet 243 mg (243 mg Oral Given 2/26/21 1707)         Patient was evaluated by both myself and Dakotah Bowman MD.    Patient presented to the emergency room today with complaints of chest pain or shortness of breath, she describes it as a pressure radiating between her shoulder blades.   Patient states that this pressure and discomfort is similar in nature to when she had her MI. Patient initial work-up in the ER shows no ischemia on EKG, negative troponin. Labs otherwise unremarkable. Patient resting comfortably at this time. We did have discussion in regards to her risk factors, patient calculated heart score is 5. Patient vital stable, she did agree with plan for admission to the hospital for cardiac evaluation. I did discuss case the hospitalist as well as the attending physician who agrees with the plan of care. Patient laboratory studies, radiographic imaging, andassessment were all discussed with the patient and/or patient family. There was shared decision-making between myself, the attending physician, as well as the patient and/or their surrogate and we are all in agreement with admission. There was an opportunity for questions and all questions were answered to the best of my ability and to the satisfaction of the patient and/or patient family. FINAL IMPRESSION:      1. Chest pain, unspecified type          DISPOSITION/PLAN:   DISPOSITIONDecision To Admit      PATIENT REFERRED TO:  No follow-up provider specified.     DISCHARGE MEDICATIONS:  New Prescriptions    No medications on file                  (Please note that portions of this note were completed with a voice recognition program.  Efforts were made to edit the dictations, but occasionally words are mis-transcribed.)    Skylar Abdullahi, APRN - CNP-C (electronically signed)        Skylar Abdullahi, ALIYAH - CECY  02/26/21 [id number]

## 2021-02-26 NOTE — ED PROVIDER NOTES
I independently performed a history and physical on [employer]. All diagnostic, treatment, and disposition decisions were made by myself in conjunction with the advanced practice provider.     -[employer] is a 50 y.o. female with hx of HTN, CAD, HLD presents to ED for back pain and chest pain. Patient reports back pain that started yesterday sitting on her computer, and felt discomfort to her upper shoulder. Noticed it was worsened today now associated with  chest pain and shortness of breath. Chest pain is substernal, sharp/burning and left axillary, intermittent . Chest pain free currently. Nauseous, fatigue and HA x 2 days. States she -PE:also had back pain and shoulder pain with her last MI.    -PE: well appearing, nontoxic, not in acute distress. RRR, CTAB/l, no w/r/r, abdomen soft, ND, NTTP, + BS x 4, no rigidity, rebound, guarding  -lab workup was unremarkable. Chest x-ray showed no acute cardiopulmonary process. -The Ekg interpreted by me shows  normal sinus rhythm with a rate of 85  Axis is   Normal  QTc is  414  Intervals and Durations are unremarkable. ST Segments: no acute change  No significant change from prior EKG dated 9/22/2018  -Given patient's significant cardiac history and description of chest pain that similar to her prior MI, felt that she warrants further inpatient work-up to rule out ACS. -Plan for admission for further workup and treatment discussed with patient who is reluctant but in agreement with plan and have no further questions/concerns    For further details of [address] emergency department encounter, please see INESSA Granda's documentation.        Lorena Rodriguez MD  02/26/21 [id number]

## 2021-02-26 NOTE — TELEPHONE ENCOUNTER
[facility]  with red flag complaint. Brief description of triage: Patient calling for concerns about chest pain and pain in between her shoulder blades. Patient with previous history of heart attack with stint placement. Triage indicates for patient to go to ED now. Care advice provided, patient verbalizes understanding; denies any other questions or concerns; instructed to call back for any new or worsening symptoms. Attention Provider: Thank you for allowing me to participate in the care of your patient. The patient was connected to triage in response to information provided to the [facility]. Please do not respond through this encounter as the response is not directed to a shared pool. Reason for Disposition   Pain also in shoulder(s) or arm(s) or jaw    Answer Assessment - Initial Assessment Questions  1. LOCATION: \"Where does it hurt? \"        Center and left side of chest    2. RADIATION: \"Does the pain go anywhere else? \" (e.g., into neck, jaw, arms, back)      Between her shoulder blades, under her left arm    3. ONSET: \"When did the chest pain begin? \" (Minutes, hours or days)       Started a couple days ago not sure of exactly when    4. PATTERN \"Does the pain come and go, or has it been constant since it started? \"  \"Does it get worse with exertion? \"       Comes and goes, worsens with exertion    5. DURATION: \"How long does it last\" (e.g., seconds, minutes, hours)      Last a couple minutes    6. SEVERITY: \"How bad is the pain? \"  (e.g., Scale 1-10; mild, moderate, or severe)     - MILD (1-3): doesn't interfere with normal activities      - MODERATE (4-7): interferes with normal activities or awakens from sleep     - SEVERE (8-10): excruciating pain, unable to do any normal activities        Yes, between her shoulder blades, 3/10    7.  CARDIAC RISK FACTORS: \"Do you have any history of heart problems or risk factors for heart disease? \" (e.g., angina, prior heart attack; diabetes, high blood pressure, high cholesterol, smoker, or strong family history of heart disease)      Heart attack:  Stints placed    8. PULMONARY RISK FACTORS: \"Do you have any history of lung disease? \"  (e.g., blood clots in lung, asthma, emphysema, birth control pills)      No    9. CAUSE: \"What do you think is causing the chest pain? \"      Worried about another heart attack    10. OTHER SYMPTOMS: \"Do you have any other symptoms? \" (e.g., dizziness, nausea, vomiting, sweating, fever, difficulty breathing, cough)        Nausea, headache, shortness of breath with exertion    11. PREGNANCY: \"Is there any chance you are pregnant? \" \"When was your last menstrual period? \"        no    Protocols used: CHEST PAIN-ADULT-[place]

## 2021-02-26 NOTE — H&P
Hospital Medicine History & Physical      PCP: Luis Taylor DO    Date of Admission: 2/26/2021    Date of Service: Pt seen/examined on 2/26/2021 and  Placed in Observation. Chief Complaint: Chest pain      History Of Present Illness:  (    44 y.o. female who presented to [facility] with above complaint. She experience upper chest pain yesterday and it continued as a chest pain today. She experience chest pain more like a pressure-like intermittent 5 in intensity associated with shortness of breath today. She told she felt exactly the same when she had a heart attack few years ago. Currently she does not have any chest pain or shortness of breath. No cough sputum fever change in mental status dizziness syncope edema of the leg or leg pain. There is no recent travel history. She told she has been having high blood pressure lately and she has been experiencing headache for last few days. She does not have any acute change in vision or any focal neurological symptoms. Past Medical History:          Diagnosis Date    CAD (coronary artery disease)     GERD (gastroesophageal reflux disease) 6/6/2018    Heart attack ([address] 11/19/2016    Hyperlipidemia 12/29/2016    Hypertension     Obesity (BMI 30-39. 9) 8/12/2017       Past Surgical History:          Procedure Laterality Date    APPENDECTOMY      CORONARY ANGIOPLASTY WITH STENT PLACEMENT      KNEE ARTHROSCOPY      KNEE ARTHROSCOPY Left 03/06/2019    medial menisectomy    KNEE ARTHROSCOPY Left 3/6/2019    LEFT KNEE VIDEO ARTHROSCOPY, PARTIAL MEDIAL MENISCECTOMY, CHONDROPLASTY performed by Aimee Roberto DO at [address] [place] [address]      SPINE SURGERY      neck    TONSILLECTOMY         Medications Prior to Admission:      Prior to Admission medications    Medication Sig Start Date End Date Taking?  Authorizing Provider   meloxicam (MOBIC) 15 MG tablet TAKE 1 TABLET BY MOUTH  DAILY 1/11/21   Aimee Roberto DO   atenolol (TENORMIN) 50 MG tablet Take 1 tablet by mouth daily 11/2/20   Crawford Fothergill Ward, DO   diclofenac (VOLTAREN) 50 MG EC tablet TAKE 1 TABLET BY MOUTH TWO TIMES A DAY WITH MEALS 4/6/20   Donna Blevins, DO   DM-Doxylamine-Acetaminophen (NYQUIL COLD & FLU PO) Take by mouth    Historical Provider, MD   benzonatate (TESSALON PERLES) 100 MG capsule Take 1 capsule by mouth 3 times daily as needed for Cough 2/28/20 2/27/21  Dania Hurtado,    levothyroxine (SYNTHROID) 75 MCG tablet TAKE 1 TABLET BY MOUTH ONE TIME A DAY  2/10/20   ALIYAH Marcus CNP   losartan (COZAAR) 25 MG tablet Take 25 mg by mouth daily 9/23/19 9/12/21  Historical Provider, MD   diclofenac sodium 1 % GEL Apply 2 g topically 4 times daily 12/13/19   Guerrero Hoffmann,    ibuprofen (ADVIL;MOTRIN) 800 MG tablet Take 1 tablet by mouth every 8 hours as needed for Pain or Fever 10/17/18   ALIYAH Hodgson CNP   ferrous sulfate 325 (65 Fe) MG tablet Take 325 mg by mouth daily (with breakfast)    Historical Provider, MD   vitamin D3 (CHOLECALCIFEROL) 400 units TABS tablet Take 2 tablets by mouth daily 8/14/17   ALIYAH Mack CNP   Ascorbic Acid (VITAMIN C) 500 MG tablet Take 500 mg by mouth daily    Historical Provider, MD   atorvastatin (LIPITOR) 80 MG tablet Take by mouth daily     Historical Provider, MD   aspirin 81 MG tablet Take 81 mg by mouth daily    Historical Provider, MD   therapeutic multivitamin-minerals (THERAGRAN-M) tablet Take 1 tablet by mouth daily. Historical Provider, MD       Allergies:  Patient has no known allergies. Social History:      The patient currently lives at home    TOBACCO:   reports that she quit smoking about 4 years ago. Her smoking use included cigarettes. She has a 5.00 pack-year smoking history. She has never used smokeless tobacco.  ETOH:   reports previous alcohol use.   E-Cigarettes/Vaping Use     Questions Responses    E-Cigarette/Vaping Use Never User    Start Date Passive Exposure     Quit Date     Counseling Given     Comments             Family History:       Reviewed in detail and negative for DM, CAD, Cancer, CVA. Positive as follows:        Problem Relation Age of Onset    Heart Disease Mother     High Blood Pressure Mother     Heart Disease Father 54    High Blood Pressure Father        REVIEW OF SYSTEMS:   Pertinent positives as noted in the HPI. All other systems reviewed and negative. PHYSICAL EXAM PERFORMED:    BP (!) 186/89   Pulse 68   Temp 99 °F (37.2 °C) (Oral)   Resp 16   Ht 5' 6\" (1.676 m)   Wt 215 lb (97.5 kg)   LMP 02/24/2021   SpO2 96%   BMI 34.70 kg/m²     General appearance:  No apparent distress, appears stated age and cooperative. Obese  HEENT:  Normal cephalic, atraumatic without obvious deformity. Pupils equal, round, and reactive to light. Extra ocular muscles intact. Conjunctivae/corneas clear. Neck: Supple, with full range of motion. No jugular venous distention. Trachea midline. Respiratory:  Normal respiratory effort. Clear to auscultation, bilaterally without Rales/Wheezes/Rhonchi. Cardiovascular:  Regular rate and rhythm with normal S1/S2 without murmurs, rubs or gallops. Abdomen: Soft, non-tender, non-distended with normal bowel sounds. Obese  Musculoskeletal:  No clubbing, cyanosis or edema bilaterally. Full range of motion without deformity. Skin: Skin color, texture, turgor normal.  No rashes or lesions. Neurologic:  Neurovascularly intact without any focal sensory/motor deficits.  Cranial nerves: II-XII intact, grossly non-focal.  Psychiatric:  Alert and oriented, thought content appropriate, normal insight  Capillary Refill: Brisk,< 3 seconds   Peripheral Pulses: +2 palpable, equal bilaterally       Labs:     Recent Labs     02/26/21  1646   WBC 10.6   HGB 11.9*   HCT 35.6*        Recent Labs     02/26/21  1646      K 3.9   CL 99   CO2 29   BUN 14   CREATININE 0.8   CALCIUM 9.7     Recent Labs

## 2021-02-27 ENCOUNTER — APPOINTMENT (OUTPATIENT)
Dept: NUCLEAR MEDICINE | Age: 46
End: 2021-02-27

## 2021-02-27 VITALS
WEIGHT: 228.3 LBS | RESPIRATION RATE: 16 BRPM | OXYGEN SATURATION: 96 % | HEIGHT: 66 IN | TEMPERATURE: 98 F | SYSTOLIC BLOOD PRESSURE: 123 MMHG | HEART RATE: 63 BPM | DIASTOLIC BLOOD PRESSURE: 77 MMHG | BODY MASS INDEX: 36.69 KG/M2

## 2021-02-27 PROBLEM — R07.9 CHEST PAIN: Status: RESOLVED | Noted: 2021-02-26 | Resolved: 2021-02-27

## 2021-02-27 LAB
CHOLESTEROL, TOTAL: 166 MG/DL (ref 0–199)
EKG ATRIAL RATE: 63 BPM
EKG DIAGNOSIS: NORMAL
EKG P AXIS: 53 DEGREES
EKG P-R INTERVAL: 148 MS
EKG Q-T INTERVAL: 422 MS
EKG QRS DURATION: 82 MS
EKG QTC CALCULATION (BAZETT): 431 MS
EKG R AXIS: 30 DEGREES
EKG T AXIS: 50 DEGREES
EKG VENTRICULAR RATE: 63 BPM
HCG QUALITATIVE: NEGATIVE
HCT VFR BLD CALC: 37.3 % (ref 36–48)
HDLC SERPL-MCNC: 41 MG/DL (ref 40–60)
HEMOGLOBIN: 12.3 G/DL (ref 12–16)
LDL CHOLESTEROL CALCULATED: 83 MG/DL
LV EF: 72 %
LVEF MODALITY: NORMAL
MCH RBC QN AUTO: 26.8 PG (ref 26–34)
MCHC RBC AUTO-ENTMCNC: 33 G/DL (ref 31–36)
MCV RBC AUTO: 81.2 FL (ref 80–100)
PDW BLD-RTO: 13.6 % (ref 12.4–15.4)
PLATELET # BLD: 260 K/UL (ref 135–450)
PMV BLD AUTO: 8.5 FL (ref 5–10.5)
RBC # BLD: 4.6 M/UL (ref 4–5.2)
TRIGL SERPL-MCNC: 211 MG/DL (ref 0–150)
VLDLC SERPL CALC-MCNC: 42 MG/DL
WBC # BLD: 7.3 K/UL (ref 4–11)

## 2021-02-27 PROCEDURE — G0378 HOSPITAL OBSERVATION PER HR: HCPCS

## 2021-02-27 PROCEDURE — 6360000002 HC RX W HCPCS: Performed by: HOSPITALIST

## 2021-02-27 PROCEDURE — G0008 ADMIN INFLUENZA VIRUS VAC: HCPCS | Performed by: NURSE PRACTITIONER

## 2021-02-27 PROCEDURE — 3430000000 HC RX DIAGNOSTIC RADIOPHARMACEUTICAL: Performed by: INTERNAL MEDICINE

## 2021-02-27 PROCEDURE — 96374 THER/PROPH/DIAG INJ IV PUSH: CPT

## 2021-02-27 PROCEDURE — 6360000002 HC RX W HCPCS: Performed by: INTERNAL MEDICINE

## 2021-02-27 PROCEDURE — 99205 OFFICE O/P NEW HI 60 MIN: CPT | Performed by: INTERNAL MEDICINE

## 2021-02-27 PROCEDURE — 6360000002 HC RX W HCPCS: Performed by: NURSE PRACTITIONER

## 2021-02-27 PROCEDURE — 78452 HT MUSCLE IMAGE SPECT MULT: CPT

## 2021-02-27 PROCEDURE — 36415 COLL VENOUS BLD VENIPUNCTURE: CPT

## 2021-02-27 PROCEDURE — 93017 CV STRESS TEST TRACING ONLY: CPT

## 2021-02-27 PROCEDURE — 93005 ELECTROCARDIOGRAM TRACING: CPT | Performed by: INTERNAL MEDICINE

## 2021-02-27 PROCEDURE — 6370000000 HC RX 637 (ALT 250 FOR IP): Performed by: INTERNAL MEDICINE

## 2021-02-27 PROCEDURE — 6370000000 HC RX 637 (ALT 250 FOR IP): Performed by: HOSPITALIST

## 2021-02-27 PROCEDURE — 84703 CHORIONIC GONADOTROPIN ASSAY: CPT

## 2021-02-27 PROCEDURE — A9502 TC99M TETROFOSMIN: HCPCS | Performed by: INTERNAL MEDICINE

## 2021-02-27 PROCEDURE — 93010 ELECTROCARDIOGRAM REPORT: CPT | Performed by: INTERNAL MEDICINE

## 2021-02-27 PROCEDURE — 80061 LIPID PANEL: CPT

## 2021-02-27 PROCEDURE — 90686 IIV4 VACC NO PRSV 0.5 ML IM: CPT | Performed by: NURSE PRACTITIONER

## 2021-02-27 PROCEDURE — 85027 COMPLETE CBC AUTOMATED: CPT

## 2021-02-27 PROCEDURE — 96372 THER/PROPH/DIAG INJ SC/IM: CPT

## 2021-02-27 PROCEDURE — 2580000003 HC RX 258: Performed by: INTERNAL MEDICINE

## 2021-02-27 RX ORDER — KETOROLAC TROMETHAMINE 30 MG/ML
30 INJECTION, SOLUTION INTRAMUSCULAR; INTRAVENOUS ONCE
Status: COMPLETED | OUTPATIENT
Start: 2021-02-27 | End: 2021-02-27

## 2021-02-27 RX ORDER — LOSARTAN POTASSIUM 50 MG/1
50 TABLET ORAL 2 TIMES DAILY
Qty: 60 TABLET | Refills: 1 | Status: SHIPPED | OUTPATIENT
Start: 2021-02-27 | End: 2022-02-11 | Stop reason: ALTCHOICE

## 2021-02-27 RX ORDER — BUTALBITAL, ACETAMINOPHEN AND CAFFEINE 50; 325; 40 MG/1; MG/1; MG/1
1 TABLET ORAL EVERY 4 HOURS PRN
Status: DISCONTINUED | OUTPATIENT
Start: 2021-02-27 | End: 2021-02-27 | Stop reason: HOSPADM

## 2021-02-27 RX ORDER — LOSARTAN POTASSIUM 25 MG/1
50 TABLET ORAL DAILY
Status: DISCONTINUED | OUTPATIENT
Start: 2021-02-27 | End: 2021-02-27 | Stop reason: HOSPADM

## 2021-02-27 RX ADMIN — LEVOTHYROXINE SODIUM 75 MCG: 0.03 TABLET ORAL at 12:51

## 2021-02-27 RX ADMIN — Medication 10 ML: at 08:18

## 2021-02-27 RX ADMIN — ENOXAPARIN SODIUM 40 MG: 40 INJECTION SUBCUTANEOUS at 13:26

## 2021-02-27 RX ADMIN — BUTALBITAL, ACETAMINOPHEN, AND CAFFEINE 1 TABLET: 50; 325; 40 TABLET ORAL at 13:25

## 2021-02-27 RX ADMIN — KETOROLAC TROMETHAMINE 30 MG: 30 INJECTION, SOLUTION INTRAMUSCULAR at 08:18

## 2021-02-27 RX ADMIN — TETROFOSMIN 31.6 MILLICURIE: 1.38 INJECTION, POWDER, LYOPHILIZED, FOR SOLUTION INTRAVENOUS at 11:50

## 2021-02-27 RX ADMIN — ASPIRIN 81 MG: 81 TABLET, CHEWABLE ORAL at 12:51

## 2021-02-27 RX ADMIN — INFLUENZA A VIRUS A/VICTORIA/2454/2019 IVR-207 (H1N1) ANTIGEN (PROPIOLACTONE INACTIVATED), INFLUENZA A VIRUS A/HONG KONG/2671/2019 IVR-208 (H3N2) ANTIGEN (PROPIOLACTONE INACTIVATED), INFLUENZA B VIRUS B/VICTORIA/705/2018 BVR-11 ANTIGEN (PROPIOLACTONE INACTIVATED), INFLUENZA B VIRUS B/PHUKET/3073/2013 BVR-1B ANTIGEN (PROPIOLACTONE INACTIVATED) 0.5 ML: 15; 15; 15; 15 INJECTION, SUSPENSION INTRAMUSCULAR at 16:29

## 2021-02-27 RX ADMIN — TETROFOSMIN 10.8 MILLICURIE: 1.38 INJECTION, POWDER, LYOPHILIZED, FOR SOLUTION INTRAVENOUS at 10:05

## 2021-02-27 RX ADMIN — LOSARTAN POTASSIUM 50 MG: 25 TABLET, FILM COATED ORAL at 12:51

## 2021-02-27 RX ADMIN — ATENOLOL 50 MG: 25 TABLET ORAL at 12:51

## 2021-02-27 ASSESSMENT — PAIN DESCRIPTION - LOCATION: LOCATION: HEAD

## 2021-02-27 ASSESSMENT — PAIN SCALES - GENERAL
PAINLEVEL_OUTOF10: 6
PAINLEVEL_OUTOF10: 6

## 2021-02-27 NOTE — DISCHARGE SUMMARY
Hospital Medicine Discharge Summary    Patient ID: Adeline Zavala      Patient's PCP: Rodolfo Lopez DO    Admit Date: 2/26/2021     Discharge Date:   2/27/21    Admitting Physician: Blayne Saravia MD     Discharge Physician: Rohan Kuhn MD     Discharge Diagnoses:     chest pain  Coronary artery disease  Active Hospital Problems    Diagnosis    Obesity (BMI 30-39. 9) [E66.9]    Hyperlipidemia [E78.5]    CAD s/p NIKOLE to mid-LAD (Nov 2016) [I25.10]    Hypertension [I10]   hypothyroidism  ABHILASH  Headache  Former tobacco use    The patient was seen and examined on day of discharge and this discharge summary is in conjunction with any daily progress note from day of discharge. Hospital Course:   50 y.o. female who presented to [facility] CAD stent, HTN, HLD, hypothyroidism. . c/o chest pain. more like a pressure intermittent 5 in intensity associated with shortness of breath today.  She told she felt exactly the same when she had a heart attack few years ago.  Currently she does not have any chest pain or shortness of breath.  No cough sputum fever change in mental status dizziness syncope edema of the leg or leg pain. Jovita Going is no recent travel history.  She told she has been having high blood pressure lately and she has been experiencing headache for week with some blurring in vision . States usually gets hives when her BP is high and did get them one day. no focal neurological symptoms. blood pressures 186/89 on the atenolol 50mg po daily and cozaar 25mg daily. Increased her to 50mg daily and bP improved . trops neg and EKG neg for ischemic changes . Seen by cardiology underwent NM stress test which was negative. she c/o HA no relief with excedrin at home, toradol 30mg iV here. Gave fioricet with improvement.    Cleared for dc home to increase her cozaar to 50mg po BID on dc and FU with her outpt cardiologist and PCP            Physical Exam Performed:     /79   Pulse 63   Temp 98 °F (36.7 °C) (Oral)   Resp 16   Ht 5' 6\" (1.676 m)   Wt 228 lb 4.8 oz (103.6 kg)   LMP 02/24/2021   SpO2 96%   BMI 36.85 kg/m²     General appearance: No distress, appears stated age and cooperative. awoke easily   HEENT: Pupils equal, round, and reactive to light. Conjunctivae/corneas clear. EOMI anicteric   Neck: Supple, full range of motion. No JVD Trachea midline. Respiratory:  Normal effort. Clear to auscultation. Cardiovascular: Regular rate and rhythm  S1/S2 without murmurs, rubs or gallops. Abdomen: Soft, non-tender, non-distended with normal bowel sounds. Musculoskeletal: No clubbing, cyanosis or edema bilaterally. Skin: Skin color, texture, turgor normal.  No rashes or lesions. Neurologic:  Cranial nerves: II-XII intact, grossly non-focal.  Psychiatric: Alert and oriented, thought content appropriate, normal insight   voiding per BRP  Peripheral Pulses: +2 palpable, equal bilaterally         Labs: For convenience and continuity at follow-up the following most recent labs are provided:      CBC:    Lab Results   Component Value Date    WBC 7.3 02/27/2021    HGB 12.3 02/27/2021    HCT 37.3 02/27/2021     02/27/2021       Renal:    Lab Results   Component Value Date     02/26/2021    K 3.9 02/26/2021    CL 99 02/26/2021    CO2 29 02/26/2021    BUN 14 02/26/2021    CREATININE 0.8 02/26/2021    CALCIUM 9.7 02/26/2021    PHOS 4.0 08/14/2017         Significant Diagnostic Studies    Radiology:   NM Cardiac Stress Test Nuclear Imaging   Final Result      XR CHEST PORTABLE   Final Result   No acute cardiopulmonary disease. Consults:     IP CONSULT TO CARDIOLOGY    Disposition:  home     Condition at Discharge: Stable    Discharge Instructions/Follow-up:   With your primary MD for continued blood pressure management  Follow up with your cardiologist     Code Status:  Full Code     Activity: activity as tolerated    Diet: cardiac diet      Discharge Medications:     Current

## 2021-02-27 NOTE — CONSULTS
[address]  Advanced CHF/Pulmonary Hypertension   Cardiac Evaluation      Sussy Thomas  YOB: 1975    Requesting PHysician:  Dr. Darwin Bhandari      Chief Complaint   Patient presents with    Chest Pain     cp radiates into her back mid sternum, cardiac hx.  Shortness of Breath    Back Pain        History of Present Illness:  Mary Paez is a 49 yo female who presented to the ED with chest pain radiating into her back and midsternal area. She has had the pain for a couple of days now. The chest pain is more like a pressure-like pain intermittent, level 5 in intensity associated with shortness of breath. She had a NSTEMI about 4 years ago in [place]. The symptoms were very similar to these symptoms. At that time, she needed a drug eluting stent to the mid LAD. No obstructive disease otherwise. On arrival to the ED, she did not have chest pain or shortness of breath. No cough, sputum production. No fever, dizziness, syncope, edema. No recent travel history. She has noticed that her blood pressure has been higher lately. Over the past week, she has had a severe headache. No changes in vision or focal neurological symptoms. She had a nuclear stress test in April, 2019 at [facility], with results shown below. She has been taking her medications as prescribed. No further chest pain since admission, but she does continue to have a headache. troponins are negative. EKG is unremarkable. Labs:    Troponin negative x 3  H/H 11.9/35.6  CXR negative for acute disease  EKG:  NSR, no acute ST-T wave changes    Meds Prior to Admission:  Atenolol 50 qd  Losartan 25 qd  lipitor 80 qd  Aspirin 81 qd    Stress test ([facility]):  4/2019  This is a normal study  Normal EF  The right ventricular size and function appears normal.     Good quality study  Attenuation artifact absent. No prior studies available for comparison.   The risk of this study is low     No Known partner: Not on file     Emotionally abused: Not on file     Physically abused: Not on file     Forced sexual activity: Not on file   Other Topics Concern    Not on file   Social History Narrative    ** Merged History Encounter **            Review of Systems:   · Constitutional: there has been no unanticipated weight loss. There's been no change in energy level, sleep pattern, or activity level. · Eyes: No visual changes or diplopia. No scleral icterus. · ENT: No Headaches, hearing loss or vertigo. No mouth sores or sore throat. · Cardiovascular: Reviewed in HPI  · Respiratory: No cough or wheezing, no sputum production. No hematemesis. · Gastrointestinal: No abdominal pain, appetite loss, blood in stools. No change in bowel or bladder habits. · Genitourinary: No dysuria, trouble voiding, or hematuria. · Musculoskeletal:  No gait disturbance, weakness or joint complaints. · Integumentary: No rash or pruritis. · Neurological: No headache, diplopia, change in muscle strength, numbness or tingling. No change in gait, balance, coordination, mood, affect, memory, mentation, behavior. · Psychiatric: No anxiety, no depression. · Endocrine: No malaise, fatigue or temperature intolerance. No excessive thirst, fluid intake, or urination. No tremor. · Hematologic/Lymphatic: No abnormal bruising or bleeding, blood clots or swollen lymph nodes. · Allergic/Immunologic: No nasal congestion or hives. Physical Examination:    Vitals:    02/27/21 0054 02/27/21 0404 02/27/21 0415 02/27/21 0816   BP: 120/71 (!) 172/78 112/62 138/86   Pulse: 64 83 58 59   Resp: 16 18  18   Temp: 97.6 °F (36.4 °C) 97.9 °F (36.6 °C)  98.9 °F (37.2 °C)   TempSrc: Oral Oral  Oral   SpO2: 97% 97%  98%   Weight: 228 lb 4.8 oz (103.6 kg)      Height:         Body mass index is 36.85 kg/m².      Wt Readings from Last 3 Encounters:   02/27/21 228 lb 4.8 oz (103.6 kg)   07/13/20 214 lb (97.1 kg)   03/13/20 220 lb (99.8 kg)     BP Readings

## 2021-02-27 NOTE — PROGRESS NOTES
Patient given discharge instructions and voiced understanding via teach back. Patient discharged in stable condition with all belongings. To car via wheelchair. Home per self.  CORA,EV

## 2021-02-27 NOTE — PROGRESS NOTES
Hospitalist Progress Note      PCP: Christine Driver DO    Date of Admission: 2/26/2021    Chief Complaint: chest pain     Hospital Course:  50 y.o. female who presented to [facility] CAD stent, HTN, HLD, hypothyroidism. . c/o chest pain. more like a pressure intermittent 5 in intensity associated with shortness of breath today. She told she felt exactly the same when she had a heart attack few years ago. Currently she does not have any chest pain or shortness of breath. No cough sputum fever change in mental status dizziness syncope edema of the leg or leg pain. There is no recent travel history. She told she has been having high blood pressure lately and she has been experiencing headache for week with some blurring in vision . States usually gets hives when her BP is high and did get them one day. no focal neurological symptoms. Subjective: HA with nausea. HA x1 one week on and off no relief multiple doses excedrin. Not tested for covid but she thinks had it in December, her boyfriend had it then and she lost her sense of taste and smell at that time, has no cough. Actually she says pain  upper back pain between shoulder blades squeezing in nature. Wed HA was so bad didn't even get OOB.  No reported F/C all other systems neg       Medications:  Reviewed    Infusion Medications   Scheduled Medications    losartan  50 mg Oral Daily    aspirin  81 mg Oral Daily    atenolol  50 mg Oral Daily    atorvastatin  80 mg Oral Nightly    levothyroxine  75 mcg Oral Daily    sodium chloride flush  10 mL Intravenous 2 times per day    enoxaparin  40 mg Subcutaneous Daily    influenza virus vaccine  0.5 mL Intramuscular Prior to discharge     PRN Meds: sodium chloride flush, promethazine **OR** ondansetron, acetaminophen **OR** acetaminophen, polyethylene glycol, nitroGLYCERIN, hydrALAZINE      Intake/Output Summary (Last 24 hours) at 2/27/2021 1325  Last data filed at 2/27/2021 0422  Gross per 24 hour   Intake 360 ml   Output 0 ml   Net 360 ml       Physical Exam Performed:    /62   Pulse 58   Temp 97.9 °F (36.6 °C) (Oral)   Resp 18   Ht 5' 6\" (1.676 m)   Wt 228 lb 4.8 oz (103.6 kg)   LMP 02/24/2021   SpO2 97%   BMI 36.85 kg/m²     General appearance: No distress, appears stated age and cooperative. awoke easily   HEENT: Pupils equal, round, and reactive to light. Conjunctivae/corneas clear. EOMI anicteric   Neck: Supple, full range of motion. No JVD Trachea midline. Respiratory:  Normal effort. Clear to auscultation. Cardiovascular: Regular rate and rhythm  S1/S2 without murmurs, rubs or gallops. Abdomen: Soft, non-tender, non-distended with normal bowel sounds. Musculoskeletal: No clubbing, cyanosis or edema bilaterally. Skin: Skin color, texture, turgor normal.  No rashes or lesions. Neurologic:  Cranial nerves: II-XII intact, grossly non-focal.  Psychiatric: Alert and oriented, thought content appropriate, normal insight   voiding per BRP  Peripheral Pulses: +2 palpable, equal bilaterally       Labs:   Recent Labs     02/26/21  1646   WBC 10.6   HGB 11.9*   HCT 35.6*        Recent Labs     02/26/21  1646      K 3.9   CL 99   CO2 29   BUN 14   CREATININE 0.8   CALCIUM 9.7     Recent Labs     02/26/21  1646   AST 22   ALT 22   BILITOT <0.2   ALKPHOS 67     No results for input(s): INR in the last 72 hours. Recent Labs     02/26/21  1646 02/26/21  1922 02/26/21  2213   TROPONINI <0.01 <0.01 <0.01       Urinalysis:      Lab Results   Component Value Date    NITRU Negative 08/12/2017    WBCUA 3-5 08/26/2014    BACTERIA 1+ 08/26/2014    RBCUA 20-50 08/26/2014    BLOODU large 10/31/2018    BLOODU Negative 08/12/2017    SPECGRAV 1.020 10/31/2018    SPECGRAV >=1.030 08/12/2017    GLUCOSEU neg 10/31/2018    GLUCOSEU Negative 08/12/2017       Radiology:  XR CHEST PORTABLE   Final Result   No acute cardiopulmonary disease.                  Assessment/Plan:    Active Hospital Problems Diagnosis    Chest pain [R07.9]    Obesity (BMI 30-39. 9) [E66.9]    Hyperlipidemia [E78.5]    CAD s/p NIKOLE to mid-LAD (Nov 2016) [I25.10]    Hypertension [I10]   chest pain  --trops neg  --No ischemic changes on EKG  --Was made NPO in case cardiology feels warrants stress test . If not resume diet     Essential HTN  --on atenolol 50mg po daily   --Increase cozaar from 25mg daily to 50mg po daily   --If still uncontrolled consider norvasc     Coronary artery disease  --history NIKOLE to mid LAD 11/2016 EF 50-55  --was on imdur in past  --continue BB, aspirin, statin      Hyperlipidemia  -atorvastatin  80mg po QHS as at home     ABHILASH  --CPAP    Hypothyroidism  --continue levothyroxine     Former tobacco     Headache  --tylenol prn orderd  --see if improves with BP control, nonfocal   --no recent med changes to explain  --try dose toradol 30mg IV x1, if no relief can do fioricet or norco?   --    DVT Prophylaxis: lovenox   Diet: Diet NPO, After Midnight  Code Status: Full Code    PT/OT Eval Status: independent     Dispo - home today if no further cardiac puga, BP can be controlled and HA improved     Gavin Hicks MD

## 2021-03-02 ENCOUNTER — TELEPHONE (OUTPATIENT)
Dept: FAMILY MEDICINE CLINIC | Age: 46
End: 2021-03-02

## 2021-03-02 NOTE — TELEPHONE ENCOUNTER
[facility] Trans[facility] Initial Follow Up Call    Outreach made within 2 business days of discharge: Yes    Patient: Brayan Barrera Patient : 1975   MRN: [id number]  Reason for Admission: There are no discharge diagnoses documented for the most recent discharge. Discharge Date: 21       Spoke with: [id number]    Discharge department/facility: [facility]    TCM Interactive Patient Contact:  Was patient able to fill all prescriptions: Yes  Was patient instructed to bring all medications to the follow-up visit:   Is patient taking all medications as directed in the discharge summary? Yes  Does patient understand their discharge instructions: Yes  Does patient have questions or concerns that need addressed prior to 7-14 day follow up office visit: no    Scheduled appointment with PCP within 7-14 days    Follow Up  No future appointments.     Rashawn Cárdenas

## 2021-03-04 ENCOUNTER — TELEPHONE (OUTPATIENT)
Dept: FAMILY MEDICINE CLINIC | Age: 46
End: 2021-03-04

## 2021-03-08 RX ORDER — LEVOTHYROXINE SODIUM 0.07 MG/1
TABLET ORAL
Qty: 30 TABLET | Refills: 9 | Status: CANCELLED | OUTPATIENT
Start: 2021-03-08

## 2021-03-08 NOTE — TELEPHONE ENCOUNTER
I called the pt she needs to be seen her last OV was 02/28/2020 No future appointments.     Please see order attached

## 2021-06-14 ENCOUNTER — TELEPHONE (OUTPATIENT)
Dept: ORTHOPEDIC SURGERY | Age: 46
End: 2021-06-14

## 2021-06-14 NOTE — TELEPHONE ENCOUNTER
Appointment Request     Patient requesting earlier appointment: Yes  Appointment offered to patient: 7/6/2021  Patient Contact Number: [phone]    Scheduled appt for first available new patient spot. She would like to contacted for a sooner appointment if possible.

## 2021-06-29 ENCOUNTER — OFFICE VISIT (OUTPATIENT)
Dept: ORTHOPEDIC SURGERY | Age: 46
End: 2021-06-29
Payer: COMMERCIAL

## 2021-06-29 VITALS — BODY MASS INDEX: 36.64 KG/M2 | HEIGHT: 66 IN | WEIGHT: 228 LBS

## 2021-06-29 DIAGNOSIS — M25.561 CHRONIC PAIN OF RIGHT KNEE: ICD-10-CM

## 2021-06-29 DIAGNOSIS — S76.312A STRAIN OF LEFT HAMSTRING, INITIAL ENCOUNTER: ICD-10-CM

## 2021-06-29 DIAGNOSIS — G89.29 CHRONIC PAIN OF RIGHT KNEE: ICD-10-CM

## 2021-06-29 DIAGNOSIS — S83.242S ACUTE MEDIAL MENISCUS TEAR OF LEFT KNEE, SEQUELA: ICD-10-CM

## 2021-06-29 DIAGNOSIS — M25.462 EFFUSION OF LEFT KNEE: ICD-10-CM

## 2021-06-29 DIAGNOSIS — M25.562 ACUTE PAIN OF LEFT KNEE: Primary | ICD-10-CM

## 2021-06-29 DIAGNOSIS — Z98.890 S/P ARTHROSCOPIC PARTIAL MEDIAL MENISCECTOMY: ICD-10-CM

## 2021-06-29 DIAGNOSIS — M23.42 LOOSE BODY IN KNEE, LEFT KNEE: ICD-10-CM

## 2021-06-29 PROCEDURE — 99213 OFFICE O/P EST LOW 20 MIN: CPT | Performed by: ORTHOPAEDIC SURGERY

## 2021-06-29 RX ORDER — SULINDAC 200 MG/1
200 TABLET ORAL 2 TIMES DAILY
Qty: 60 TABLET | Refills: 3 | Status: SHIPPED | OUTPATIENT
Start: 2021-06-29 | End: 2022-02-08

## 2021-06-29 NOTE — PROGRESS NOTES
KNEE VISIT      HISTORY OF PRESENT ILLNESS    Sussy Pitts is a 50 y.o. female who presents for evaluation of bilateral left worse than right knee pain she has had for a few years. She had previously been taken care of by Dr. Stone Baker, who performed left knee arthroscopy for loose body on 3/6/2019. She had an injury dated 10/5/2018 and gave a history of this to Dr. Oseguera Life:  \"a 38 y.o. female here regarding bilateral knee injuries, she is a dispatcher, currently in management, was exiting a bus on October 5 when she slipped and fell directly onto her right knee and forcefully ended her left knee pulling the acute part of her thigh. She did not see improvement of symptoms over the course of a few days and presented to the emergency room where x-rays were obtained. She continues to have 4 out of 10 discomfort on the posterior aspect of the left knee and thigh, this is worse after sitting for long periods of time and walking. She has 2 out of 10 anterior pain on the knee and states this continues to improve. She denies catching, locking or instability. \"  She had MRI and subsequent surgery and still persistent having pain swelling stiffness and hamstring type pain despite substantial intervention over the last few years. Although the attention has been directed her left knee which was the worst 1 for the better part of this last few years, she still persistent having pain in her right knee also. ROS    Well-documented patient history form dated 6/29/2021  All other ROS negative except for above.     Past Surgical history    Past Surgical History:   Procedure Laterality Date    APPENDECTOMY      CORONARY ANGIOPLASTY WITH STENT PLACEMENT      KNEE ARTHROSCOPY      KNEE ARTHROSCOPY Left 03/06/2019    medial menisectomy    KNEE ARTHROSCOPY Left 3/6/2019    LEFT KNEE VIDEO ARTHROSCOPY, PARTIAL MEDIAL MENISCECTOMY, CHONDROPLASTY performed by Leif Meade DO at [address] [place] [address]  NECK SURGERY      SPINE SURGERY      neck    TONSILLECTOMY         PAST MEDICAL    Past Medical History:   Diagnosis Date    CAD (coronary artery disease)     GERD (gastroesophageal reflux disease) 6/6/2018    Heart attack ([address] 11/19/2016    Hyperlipidemia 12/29/2016    Hypertension     Obesity (BMI 30-39. 9) 8/12/2017       Allergies    No Known Allergies    Meds    Current Outpatient Medications   Medication Sig Dispense Refill    sulindac (CLINORIL) 200 MG tablet Take 1 tablet by mouth 2 times daily 60 tablet 3    losartan (COZAAR) 50 MG tablet Take 1 tablet by mouth 2 times daily 60 tablet 1    meloxicam (MOBIC) 15 MG tablet TAKE 1 TABLET BY MOUTH  DAILY 90 tablet 3    atenolol (TENORMIN) 50 MG tablet Take 1 tablet by mouth daily 30 tablet 1    diclofenac (VOLTAREN) 50 MG EC tablet TAKE 1 TABLET BY MOUTH TWO TIMES A DAY WITH MEALS 60 tablet 0    DM-Doxylamine-Acetaminophen (NYQUIL COLD & FLU PO) Take by mouth      levothyroxine (SYNTHROID) 75 MCG tablet TAKE 1 TABLET BY MOUTH ONE TIME A DAY  30 tablet 9    diclofenac sodium 1 % GEL Apply 2 g topically 4 times daily 2 Tube 3    ibuprofen (ADVIL;MOTRIN) 800 MG tablet Take 1 tablet by mouth every 8 hours as needed for Pain or Fever 20 tablet 0    ferrous sulfate 325 (65 Fe) MG tablet Take 325 mg by mouth daily (with breakfast)      vitamin D3 (CHOLECALCIFEROL) 400 units TABS tablet Take 2 tablets by mouth daily 30 tablet 0    Ascorbic Acid (VITAMIN C) 500 MG tablet Take 500 mg by mouth daily      atorvastatin (LIPITOR) 80 MG tablet Take by mouth daily       aspirin 81 MG tablet Take 81 mg by mouth daily      therapeutic multivitamin-minerals (THERAGRAN-M) tablet Take 1 tablet by mouth daily. No current facility-administered medications for this visit.        Social    Social History     Socioeconomic History    Marital status:      Spouse name: Not on file    Number of children: Not on file    Years of education: Not on file    Highest education level: Not on file   Occupational History    Not on file   Tobacco Use    Smoking status: Former Smoker     Packs/day: 0.50     Years: 10.00     Pack years: 5.00     Types: Cigarettes     Quit date: 2016     Years since quittin.6    Smokeless tobacco: Never Used   Vaping Use    Vaping Use: Never used   Substance and Sexual Activity    Alcohol use: Not Currently     Comment: occasional    Drug use: No    Sexual activity: Yes     Partners: Male   Other Topics Concern    Not on file   Social History Narrative    ** Merged History Encounter **          Social Determinants of Health     Financial Resource Strain:     Difficulty of Paying Living Expenses:    Food Insecurity:     Worried About Running Out of Food in the Last Year:     Ran Out of Food in the Last Year:    Transportation Needs:     Lack of Transportation (Medical):  Lack of Transportation (Non-Medical):    Physical Activity:     Days of Exercise per Week:     Minutes of Exercise per Session:    Stress:     Feeling of Stress :    Social Connections:     Frequency of Communication with Friends and Family:     Frequency of Social Gatherings with Friends and Family:     Attends Yazdanism Services:     Active Member of Clubs or Organizations:     Attends Club or Organization Meetings:     Marital Status:    Intimate Partner Violence:     Fear of Current or Ex-Partner:     Emotionally Abused:     Physically Abused:     Sexually Abused:        Family HISTORY    Family History   Problem Relation Age of Onset    Heart Disease Mother     High Blood Pressure Mother     Heart Disease Father 54    High Blood Pressure Father        PHYSICAL EXAM    Vital Signs:  Ht 5' 6\" (1.676 m)   Wt 228 lb (103.4 kg)   BMI 36.80 kg/m²   General Appearance:  Normal body habitus. Alert and oriented to person, place, and time.    Affect:  Normal.   Gait: Antalgic stiff leg again mostly in the right leg and difficulty with steps. Good balance and coordination. Skin:  Intact. Sensation:  Intact. Strength:  Intact. Reflexes:  Intact. Pulses:  Intact. Knee Exam:    Effusion: 2+ in the left knee and negative in the right    Range of Motion Right Left   Extension 0 0   Flexion 110 110     Provocative Test Right Left    Positive Negative Positive Negative   Anterior drawer [] [] [] []   Lachman [] [] [] []   Posterior drawer [] [] [] []   Varus testing [] [x] [] [x]   Valgus testing [] [x] [x] []   Joint line tenderness [x] [] [x] []     Additional Exam Comments: Her neurocirculatory lymphatic exam appears normal symmetric both lower extremities but she does have pain in both knees generalized to direct palpation and stress. Most of her pain in the left knee is anterolateral and posterior medial.      IMAGING STUDIES    X-rays that she had taken in the past were reviewed and showed no acute pathology but has some arthritic wear mostly medially. She still appear to have greater than 50% of joint space remaining. I reviewed MRI report which demonstrated that she had tricompartmentalarthritismostlyprominentinthemedial with degeneration of the posterior horn of the medial meniscus without definite tear but also had a loose body there as well as subchondral edema in the medial compartment that they said was similar to the prior MRI. IMPRESSION    Left knee pain and effusion secondary to injury with possible stress reaction persistent in the medial compartment of her knee. PLAN      1. Conservative care options including physical therapy, NSAIDs, bracing, and activity modification were discussed. 2.  The indications for therapeutic injections were discussed. 3.  The indications for additional imaging studies were discussed. 4.  After considering the various options discussed, the patient elected to pursue a course that includes obtaining another MRI of the left knee and give her some Clinoril for medication.   I told her that I would like to evaluate the amount of stress reaction she has in her knee to see if she may be a candidate for subchondroplasty. Certainly the meniscal pathology and loose body seem to be an issue with her but she also has arthritis which although likely present prior to her injury, was asymptomatic and nondisabling prior to the injury. She has pain that can be construed to be arthritic in origin and because of that, it would be my medical opinion based upon a reasonable degree of medical probability that the arthritic pain she has in her knees at this time is causally related to her injury based upon substantial aggravation of a dormant nondisabling pre-existing condition that was brought into disabling reality by the injury. The MRI will help me better define her present condition in the left knee and what we can do to try to help facilitate relief of pain in that knee prior to consideration of addressing her left knee which is less disabling at this time. She feels able to do the job she is doing at this time but does not feel that she can return back to her prior occupation based upon the pain she is having in her knees and most required overnight job.

## 2021-07-01 ENCOUNTER — NURSE TRIAGE (OUTPATIENT)
Dept: OTHER | Facility: CLINIC | Age: 46
End: 2021-07-01

## 2021-07-01 NOTE — TELEPHONE ENCOUNTER
Received call from Sam Storm at [facility] with Red Flag Complaint. Brief description of triage: patient calling with c/o of upper abdominal pain radiating to her back between her shoulder blades. See below assessment. Triage indicates for patient to go to ED now, patient agreeable. Care advice provided, patient verbalizes understanding; denies any other questions or concerns; instructed to call back for any new or worsening symptoms. Attention Provider: Thank you for allowing me to participate in the care of your patient. The patient was connected to triage in response to information provided to the ECC. Please do not respond through this encounter as the response is not directed to a shared pool. Reason for Disposition   Pain lasting > 10 minutes and over 36years old and associated chest, arm, neck, upper back, or jaw pain    Answer Assessment - Initial Assessment Questions  1. LOCATION: \"Where does it hurt? \"       Upper abdomen, right under ribs    2. RADIATION: \"Does the pain shoot anywhere else? \" (e.g., chest, back)      Back, in between shoulder blades    3. ONSET: \"When did the pain begin? \" (e.g., minutes, hours or days ago)       Saturday, began with nausea and no appetite and then the pain started    4. SUDDEN: \"Gradual or sudden onset? \"      Feeling \"worn down and fatigue\" for two weeks, Saturday the pain started    5. PATTERN \"Does the pain come and go, or is it constant? \"     - If constant: \"Is it getting better, staying the same, or worsening? \"       (Note: Constant means the pain never goes away completely; most serious pain is constant and it progresses)      - If intermittent: \"How long does it last?\" \"Do you have pain now? \"      (Note: Intermittent means the pain goes away completely between bouts)      Constant    6. SEVERITY: \"How bad is the pain? \"  (e.g., Scale 1-10; mild, moderate, or severe)     - MILD (1-3): doesn't interfere with normal activities, abdomen soft and not tender to touch      - MODERATE (4-7): interferes with normal activities or awakens from sleep, tender to touch      - SEVERE (8-10): excruciating pain, doubled over, unable to do any normal activities        Constant but severity varies, \"squeezing\", 5-6/10, interfering with normal activities, still working but [employer]"    7. RECURRENT SYMPTOM: \"Have you ever had this type of abdominal pain before? \" If so, ask: \"When was the last time? \" and \"What happened that time? \"       Denies abdominal pain, back pain is similar to when she had an MI in the past    8. AGGRAVATING FACTORS: \"Does anything seem to cause this pain? \" (e.g., foods, stress, alcohol)      Food makes it worse    9. CARDIAC SYMPTOMS: \"Do you have any of the following symptoms: chest pain, difficulty breathing, sweating, nausea? \"      Nausea, back pain between shoulder blades    10. OTHER SYMPTOMS: \"Do you have any other symptoms? \" (e.g., fever, vomiting, diarrhea)        Diarrhea, very soft stool but not runny    11. PREGNANCY: \"Is there any chance you are pregnant? \" \"When was your last menstrual period? \"        Not asked    Protocols used: ABDOMINAL PAIN - UPPER-ADULT-OH

## 2021-07-13 ENCOUNTER — TELEPHONE (OUTPATIENT)
Dept: ORTHOPEDIC SURGERY | Age: 46
End: 2021-07-13

## 2021-07-13 NOTE — TELEPHONE ENCOUNTER
LEFT MESSAGE THAT C9 FOR MRI HAS BEEN DENIED DUE TO CLAIM BEING CLASSIFIED AS INACTIVE. IW CAN APPEAL THIS WITH [facility].

## 2021-09-14 ENCOUNTER — OFFICE VISIT (OUTPATIENT)
Dept: ORTHOPEDIC SURGERY | Age: 46
End: 2021-09-14
Payer: COMMERCIAL

## 2021-09-14 VITALS — BODY MASS INDEX: 36.64 KG/M2 | HEIGHT: 66 IN | WEIGHT: 228 LBS

## 2021-09-14 DIAGNOSIS — G89.29 CHRONIC PAIN OF LEFT KNEE: ICD-10-CM

## 2021-09-14 DIAGNOSIS — M25.562 CHRONIC PAIN OF LEFT KNEE: ICD-10-CM

## 2021-09-14 DIAGNOSIS — S83.242S ACUTE MEDIAL MENISCUS TEAR OF LEFT KNEE, SEQUELA: ICD-10-CM

## 2021-09-14 DIAGNOSIS — M25.462 EFFUSION OF LEFT KNEE: ICD-10-CM

## 2021-09-14 DIAGNOSIS — M25.561 CHRONIC PAIN OF RIGHT KNEE: Primary | ICD-10-CM

## 2021-09-14 DIAGNOSIS — G89.29 CHRONIC PAIN OF RIGHT KNEE: Primary | ICD-10-CM

## 2021-09-14 PROCEDURE — 99213 OFFICE O/P EST LOW 20 MIN: CPT | Performed by: ORTHOPAEDIC SURGERY

## 2021-09-14 RX ORDER — PREDNISONE 1 MG/1
TABLET ORAL
Qty: 55 TABLET | Refills: 0 | Status: ON HOLD | OUTPATIENT
Start: 2021-09-14 | End: 2022-02-24 | Stop reason: HOSPADM

## 2021-09-14 NOTE — PROGRESS NOTES
She returns today for evaluation. Apparently her MRI of her left knee was not allowed prior to this visit because her claim was inactive. At this time according to the patient, her  has reactivated her claim and she should be good to go. We still need to get confirmation of that in the chart. She now complains of severe pain in both knees left worse than the right with swelling stiffness and locking in her left knee. On examination to her left knee she has a 3+ effusion which also demonstrates a Baker's cyst.  She has generalized pain medially laterally and in the parapatellar region. She feels a grating sensation when she tries to bend her knee which feels quite tight and her range of motion is limited both extension and flexion lacking 8 degrees full extension and flexion to 90 degrees before she has pain which limits that motion. Exam is similar to a lesser degree on the right side. Her neurocirculatory lymphatic exam otherwise is normal symmetric to both lower extremities. She has mild varus deformity of both knees. X-rays 3 views of both knees demonstrate grade 2 osteoarthritis with greater than 50% joint space remaining but she has moderate varus deformity in both knees. Impression: Bilateral left worse than right knee pain secondary to meniscal pathology and osteoarthritis with effusion and locking. Recommendation: At this time and recommend at 10 then prednisone taper to alleviate some of the pain and swelling in her legs. I would also recommend an MRI of her left knee to see if she would in fact have anything that would benefit from surgical intervention such as arthroscopy or subchondroplasty. Because of her young age, and because she is not bone-on-bone arthritis at this point, I would like to put off for prolonged the potential need for knee replacement surgery as long as possible. She appears to understand these recommendations will proceed accordingly.   She should return after testing

## 2022-01-10 ENCOUNTER — OFFICE VISIT (OUTPATIENT)
Dept: FAMILY MEDICINE CLINIC | Age: 47
End: 2022-01-10
Payer: COMMERCIAL

## 2022-01-10 VITALS
SYSTOLIC BLOOD PRESSURE: 155 MMHG | HEIGHT: 66 IN | WEIGHT: 230 LBS | DIASTOLIC BLOOD PRESSURE: 91 MMHG | BODY MASS INDEX: 36.96 KG/M2 | TEMPERATURE: 97.9 F

## 2022-01-10 DIAGNOSIS — I10 ESSENTIAL HYPERTENSION: Primary | Chronic | ICD-10-CM

## 2022-01-10 DIAGNOSIS — I25.10 ASHD (ARTERIOSCLEROTIC HEART DISEASE): ICD-10-CM

## 2022-01-10 DIAGNOSIS — Z12.11 SCREENING FOR COLON CANCER: ICD-10-CM

## 2022-01-10 DIAGNOSIS — E66.3 OVERWEIGHT: ICD-10-CM

## 2022-01-10 DIAGNOSIS — T75.3XXA MOTION SICKNESS, INITIAL ENCOUNTER: ICD-10-CM

## 2022-01-10 DIAGNOSIS — E78.5 HYPERLIPIDEMIA, UNSPECIFIED HYPERLIPIDEMIA TYPE: ICD-10-CM

## 2022-01-10 PROCEDURE — 99214 OFFICE O/P EST MOD 30 MIN: CPT | Performed by: FAMILY MEDICINE

## 2022-01-10 RX ORDER — LEVOTHYROXINE SODIUM 0.07 MG/1
TABLET ORAL
Qty: 30 TABLET | Refills: 9 | Status: SHIPPED | OUTPATIENT
Start: 2022-01-10

## 2022-01-10 RX ORDER — ROSUVASTATIN CALCIUM 40 MG/1
TABLET, COATED ORAL
Qty: 90 TABLET | Refills: 0 | Status: SHIPPED | OUTPATIENT
Start: 2022-01-10 | End: 2022-02-11 | Stop reason: SDUPTHER

## 2022-01-10 RX ORDER — SCOLOPAMINE TRANSDERMAL SYSTEM 1 MG/1
1 PATCH, EXTENDED RELEASE TRANSDERMAL
Qty: 10 PATCH | Refills: 0 | Status: ON HOLD | OUTPATIENT
Start: 2022-01-10 | End: 2022-02-24 | Stop reason: HOSPADM

## 2022-01-10 RX ORDER — ATENOLOL 50 MG/1
50 TABLET ORAL DAILY
Qty: 30 TABLET | Refills: 1 | Status: SHIPPED | OUTPATIENT
Start: 2022-01-10 | End: 2022-03-10

## 2022-01-10 SDOH — ECONOMIC STABILITY: FOOD INSECURITY: WITHIN THE PAST 12 MONTHS, THE FOOD YOU BOUGHT JUST DIDN'T LAST AND YOU DIDN'T HAVE MONEY TO GET MORE.: NEVER TRUE

## 2022-01-10 SDOH — ECONOMIC STABILITY: FOOD INSECURITY: WITHIN THE PAST 12 MONTHS, YOU WORRIED THAT YOUR FOOD WOULD RUN OUT BEFORE YOU GOT MONEY TO BUY MORE.: NEVER TRUE

## 2022-01-10 ASSESSMENT — ENCOUNTER SYMPTOMS
ABDOMINAL PAIN: 0
STRIDOR: 0
BACK PAIN: 0
CHOKING: 0
COUGH: 0
CHEST TIGHTNESS: 0
WHEEZING: 0
SHORTNESS OF BREATH: 0

## 2022-01-10 ASSESSMENT — PATIENT HEALTH QUESTIONNAIRE - PHQ9
2. FEELING DOWN, DEPRESSED OR HOPELESS: 0
SUM OF ALL RESPONSES TO PHQ9 QUESTIONS 1 & 2: 0
SUM OF ALL RESPONSES TO PHQ QUESTIONS 1-9: 0
1. LITTLE INTEREST OR PLEASURE IN DOING THINGS: 0
SUM OF ALL RESPONSES TO PHQ QUESTIONS 1-9: 0

## 2022-01-10 ASSESSMENT — SOCIAL DETERMINANTS OF HEALTH (SDOH): HOW HARD IS IT FOR YOU TO PAY FOR THE VERY BASICS LIKE FOOD, HOUSING, MEDICAL CARE, AND HEATING?: NOT VERY HARD

## 2022-01-10 NOTE — PROGRESS NOTES
Subjective:      Patient ID: Ana Velazquez is a 48 y.o. female. JUDE Hi is here for follow-up on hypertension whose control is not good here today. She monitors her home blood pressure frequently and it is in the 120s over the 80s. Her ASHD seems stable. I wrote her a prescription for Transderm-Scop because she is going on a cruise and has known motion sickness. I sent her for a liver and a lipid panel to follow-up on her hyperlipidemia. She is apparently not been taking her statin drug that have been prescribed for her in the past.  She remains overweight. Review of Systems   Constitutional: Negative for activity change, appetite change, chills, diaphoresis, fatigue, fever and unexpected weight change. Respiratory: Negative for cough, choking, chest tightness, shortness of breath, wheezing and stridor. Cardiovascular: Negative for chest pain, palpitations and leg swelling. Gastrointestinal: Negative for abdominal pain. Genitourinary: Negative for difficulty urinating. Musculoskeletal: Negative for arthralgias and back pain. Skin: Negative for rash. Neurological: Negative for dizziness. Objective:   Physical Exam  Vitals and nursing note reviewed. Constitutional:       Appearance: She is well-developed. HENT:      Head: Normocephalic and atraumatic. Right Ear: External ear normal.      Left Ear: External ear normal.      Nose: Nose normal.   Eyes:      Conjunctiva/sclera: Conjunctivae normal.      Pupils: Pupils are equal, round, and reactive to light. Neck:      Thyroid: No thyromegaly. Vascular: No JVD. Trachea: No tracheal deviation. Cardiovascular:      Rate and Rhythm: Normal rate and regular rhythm. Heart sounds: Normal heart sounds. No murmur heard. No friction rub. No gallop. Pulmonary:      Effort: Pulmonary effort is normal. No respiratory distress. Breath sounds: Normal breath sounds. No stridor. No wheezing or rales.    Chest: Chest wall: No tenderness. Abdominal:      General: Bowel sounds are normal. There is no distension. Palpations: Abdomen is soft. There is no mass. Tenderness: There is no abdominal tenderness. There is no guarding or rebound. Musculoskeletal:         General: No tenderness. Normal range of motion. Cervical back: Normal range of motion and neck supple. Lymphadenopathy:      Cervical: No cervical adenopathy. Skin:     General: Skin is warm and dry. Coloration: Skin is not pale. Findings: No erythema or rash. Neurological:      Mental Status: She is alert and oriented to person, place, and time. Cranial Nerves: No cranial nerve deficit. Motor: No abnormal muscle tone. Coordination: Coordination normal.      Deep Tendon Reflexes: Reflexes are normal and symmetric. Reflexes normal.         Assessment / Plan:       1. Essential hypertension-poorly controlled here but well controlled at home.    - atenolol (TENORMIN) 50 MG tablet; Take 1 tablet by mouth daily  Dispense: 30 tablet; Refill: 1  - Grisel Nguyen MD, Cardiology, [facility]  - CBC; Future  - Comprehensive Metabolic Panel; Future  - Hepatic Function Panel; Future  - Lipid Panel; Future  - TSH without Reflex; Future    2. Screening for colon cancer-ordered colonoscopy    - COLONOSCOPY W/ OR W/O BIOPSY    3. ASHD (arteriosclerotic heart disease)-apparently stable. I referred Crystal to Dr. Arash Alicea for cardiology follow-up      4. Motion sickness, initial encounter-prescribed Transderm-Scop      5. Hyperlipidemia, unspecified hyperlipidemia type-ordered liver and lipid panel for follow-up      6.  Overweight

## 2022-01-27 DIAGNOSIS — I10 ESSENTIAL HYPERTENSION: Chronic | ICD-10-CM

## 2022-01-27 LAB
A/G RATIO: 1.5 (ref 1.1–2.2)
ALBUMIN SERPL-MCNC: 4.4 G/DL (ref 3.4–5)
ALP BLD-CCNC: 75 U/L (ref 40–129)
ALT SERPL-CCNC: 15 U/L (ref 10–40)
ANION GAP SERPL CALCULATED.3IONS-SCNC: 16 MMOL/L (ref 3–16)
AST SERPL-CCNC: 17 U/L (ref 15–37)
BILIRUB SERPL-MCNC: <0.2 MG/DL (ref 0–1)
BILIRUBIN DIRECT: <0.2 MG/DL (ref 0–0.3)
BILIRUBIN, INDIRECT: NORMAL MG/DL (ref 0–1)
BUN BLDV-MCNC: 13 MG/DL (ref 7–20)
CALCIUM SERPL-MCNC: 9.4 MG/DL (ref 8.3–10.6)
CHLORIDE BLD-SCNC: 98 MMOL/L (ref 99–110)
CHOLESTEROL, TOTAL: 212 MG/DL (ref 0–199)
CO2: 24 MMOL/L (ref 21–32)
CREAT SERPL-MCNC: 0.6 MG/DL (ref 0.6–1.1)
GFR AFRICAN AMERICAN: >60
GFR NON-AFRICAN AMERICAN: >60
GLUCOSE BLD-MCNC: 89 MG/DL (ref 70–99)
HCT VFR BLD CALC: 37.3 % (ref 36–48)
HDLC SERPL-MCNC: 45 MG/DL (ref 40–60)
HEMOGLOBIN: 12.2 G/DL (ref 12–16)
LDL CHOLESTEROL CALCULATED: 118 MG/DL
MCH RBC QN AUTO: 26.5 PG (ref 26–34)
MCHC RBC AUTO-ENTMCNC: 32.5 G/DL (ref 31–36)
MCV RBC AUTO: 81.4 FL (ref 80–100)
PDW BLD-RTO: 14.2 % (ref 12.4–15.4)
PLATELET # BLD: 314 K/UL (ref 135–450)
PMV BLD AUTO: 8.8 FL (ref 5–10.5)
POTASSIUM SERPL-SCNC: 4.2 MMOL/L (ref 3.5–5.1)
RBC # BLD: 4.59 M/UL (ref 4–5.2)
SODIUM BLD-SCNC: 138 MMOL/L (ref 136–145)
TOTAL PROTEIN: 7.3 G/DL (ref 6.4–8.2)
TRIGL SERPL-MCNC: 246 MG/DL (ref 0–150)
TSH SERPL DL<=0.05 MIU/L-ACNC: 2.06 UIU/ML (ref 0.27–4.2)
VLDLC SERPL CALC-MCNC: 49 MG/DL
WBC # BLD: 7.4 K/UL (ref 4–11)

## 2022-02-08 RX ORDER — SULINDAC 200 MG/1
TABLET ORAL
Qty: 60 TABLET | Refills: 0 | Status: SHIPPED | OUTPATIENT
Start: 2022-02-08 | End: 2022-03-10

## 2022-02-10 NOTE — PROGRESS NOTES
[address]   Cardiac Consultation    Referring Provider:  Shabana Hi DO     Chief Complaint   Patient presents with    New Patient     high BP, had a heart attack 6 years ago    Coronary Artery Disease    Hypertension    Hyperlipidemia      Sussy Hallie Rod   1975    History of Present Illness:   Janel Sharif is a 48 y.o. female who is here today as a NEW patient consult for cardiology, referred by Shabana Hi DO for hypertension. Patient has a past medical history of coronary artery disease, hypertension, and hyperlipidemia. Patient had a left heart cath in 2016 which showed patient LAD stent. Normal stress echocardiogram in 2017. Stress test 2018 showed small, moderate intensity, fixed apical defect with hypokinesis, consistent with infarct. There is a small, mild intensity, fixed inferolateral defect consistent with  diaphragmatic attenuation artifact. Repeat stress test 2/27/2021 showed no ischemia or scar. Most recent echocardiogram from 6/6/2018 showed an EF of 55%. Today she states her original stent was placed in [place] in 2016. She states she had been following with [facility] cardiology but is transferring her care here. She states she had not been taking her Crestor on a regular basis. She feels well overall. Checks her blood pressure at home and it is elevated occasionally. She has SOB at times with exertion. This is chronic w/o sig change. No assoc chest pain. Resolves w/ rest. Mild. Maybe a few times a week. She had a stress test in 2/2021 due to chest pain which was normal. Patient currently denies any weight gain, edema, palpitations, chest pain,  dizziness, and syncope. Past Medical History:   has a past medical history of CAD (coronary artery disease), GERD (gastroesophageal reflux disease), Heart attack ([address] Hyperlipidemia, Hypertension, and Obesity (BMI 30-39.9). Surgical History:   has a past surgical history that includes Spine surgery;  Appendectomy; Historical Provider, MD   therapeutic multivitamin-minerals (THERAGRAN-M) tablet Take 1 tablet by mouth daily. Yes Historical Provider, MD   scopolamine (TRANSDERM-SCOP, 1.5 MG,) transdermal patch Place 1 patch onto the skin every 72 hours  Patient not taking: Reported on 2/11/2022 1/10/22 1/10/23  [facility], DO   rosuvastatin (CRESTOR) 40 MG tablet TAKE 1 TABLET BY MOUTH IN THE EVENING  Patient not taking: Reported on 2/11/2022 1/10/22   [facility], DO   predniSONE (DELTASONE) 5 MG tablet TAKE 10 TABLETS THE FIRST DAY THEN DECREASED BY 1 TABLET EACH DAY UNTIL FINISHED  Patient not taking: Reported on 1/10/2022 9/14/21   Bryanna Ansari MD   meloxicam (MOBIC) 15 MG tablet TAKE 1 TABLET BY MOUTH  DAILY  Patient not taking: Reported on 1/10/2022 1/11/21   James Hoffmann DO   diclofenac (VOLTAREN) 50 MG EC tablet TAKE 1 TABLET BY MOUTH TWO TIMES A DAY WITH MEALS  Patient not taking: Reported on 1/10/2022 4/6/20   James Hoffmann DO   DM-Doxylamine-Acetaminophen (NYQUIL COLD & FLU PO) Take by mouth  Patient not taking: Reported on 2/11/2022    Historical Provider, MD        Allergies:  Patient has no known allergies. Review of Systems:   · Constitutional: there has been no unanticipated weight loss. There's been no change in energy level, sleep pattern, or activity level. · Eyes: No visual changes or diplopia. No scleral icterus. · ENT: No Headaches, hearing loss or vertigo. No mouth sores or sore throat. · Cardiovascular: Reviewed in HPI  · Respiratory: No cough or wheezing, no sputum production. No hematemesis. · Gastrointestinal: No abdominal pain, appetite loss, blood in stools. No change in bowel or bladder habits. · Genitourinary: No dysuria, trouble voiding, or hematuria. · Musculoskeletal:  No gait disturbance, weakness or joint complaints. · Integumentary: No rash or pruritis. · Neurological: No headache, diplopia, change in muscle strength, numbness or tingling.  No change in gait, balance, coordination, mood, affect, memory, mentation, behavior. · Psychiatric: No anxiety, no depression. · Endocrine: No malaise, fatigue or temperature intolerance. No excessive thirst, fluid intake, or urination. No tremor. · Hematologic/Lymphatic: No abnormal bruising or bleeding, blood clots or swollen lymph nodes. · Allergic/Immunologic: No nasal congestion or hives. Physical Examination:    Vitals:    02/11/22 1002   BP: 136/84   Pulse: 77   SpO2: 96%        Constitutional and General Appearance: NAD   Respiratory:  · Normal excursion and expansion without use of accessory muscles  · Resp Auscultation: Normal breath sounds without dullness  Cardiovascular:  · The apical impulses not displaced  · Heart tones are crisp and normal  · Cervical veins are not engorged  · The carotid upstroke is normal in amplitude and contour without delay or bruit  · Normal S1S2, No S3, No Murmur  · Peripheral pulses are symmetrical and full  · There is no clubbing, cyanosis of the extremities. · No edema  · Femoral Arteries: 2+ and equal  · Pedal Pulses: 2+ and equal   Abdomen:  · No masses or tenderness  · Liver/Spleen: No Abnormalities Noted  Neurological/Psychiatric:  · Alert and oriented in all spheres  · Moves all extremities well  · Exhibits normal gait balance and coordination  · No abnormalities of mood, affect, memory, mentation, or behavior are noted    [place] [facility] [place]  Mild CAD w/ patent LAD stent  LVEF 50-55%    Echocardiogram 11/23/2016  Summary   LV systolic function is lower normal with ejection fraction estimated at   50-55%. There is mild hypokinesis of the apex. Diastolic filling parameters suggests normal diastolic filling pressure . Mild mitral annular calcification. Trivial tricuspid regurgitation without jet adequate to estimate systolic   pulmonary artery pressure (SPAP).      Echocardiogram 8/2017  Summary   Normal left ventricle systolic function with an estimated ejection fraction   of 55%. No regional wall motion abnormalities are seen. Normal left ventricular diastolic filling pressure. The left atrium is mildly dilated. Mild mitral and tricuspid regurgitation. Systolic pulmonary artery pressure (SPAP) is normal and estimated at 34 mmHg   (RA pressure 3 mmHg). Last echo on 11/23/2016 showed EF 50-55%. Stress echocardiogram 2017 [facility]   Study Conclusions     - Stress ECG conclusions: There were no stress arrhythmias or     conduction abnormalities. The stress ECG was negative for     ischemia. Maldonado scoring: exercise time of 9.5min; maximum ST     deviation of 0mm; no angina; resulting score is 10. This score     predicts a low risk of cardiac events. - Stress echo: Normal echo stress. Echocardiogram 6/6/2018  Summary   LV systolic function is normal with ejection fraction estimated at 55%. No regional wall motion abnormalities. Normal left ventricular diastolic filling pressure. Mild mitral regurgitation. Trivial tricuspid regurgitation. Systolic pulmonary artery pressure (SPAP) is normal estimated at 30 mmHg   (Right atrial pressure of 3 mmHg). Stress test 9/22/2018  Summary  Normal hemodynamic response to exercise. ECG portion of stress test is negative for ischemia by diagnostic criteria  (reached 93% of max predicted). Normal LV size and systolic function. Left ventricular ejection fraction of 73 %. There is a small, moderate intensity, fixed apical defect with hypokinesis,  consistent with infarct. There is a small, mild intensity, fixed inferolateral defect consistent with  diaphragmatic attenuation artifact. No evidence of myocardium at risk for significant reversible ischemia     Stress test 2019  IMPRESSION:   This is a normal study   Normal EF   The right ventricular size and function appears normal.     Good quality study   Attenuation artifact absent. No prior studies available for comparison.    The risk of this study is low Stress test 2/27/2021  Conclusions    Summary  There is normal isotope uptake at stress and rest.  No evidence of myocardium at risk for significant reversible ischemia. Normal LV function. Overall findings represent a low risk scan. EKG 2/27/2021  NSR, rate 63    Assessment:   Coronary artery disease - stable  VILLARREAL - mild, chronic, stable  Mild mitral regurgitation. Hypertension - suboptimal   Hyperlipidemia - suboptimal. likely due to not consistently taking statin recently     Plan:  Stop Losartan   Start Benicar (Olmesartan) 40 mg daily   Plan to repeat fasting lipids in a few months after consistently taking Crestor and after working on diet and exercise   Cardiac medications reviewed including indications and pertinent side effects    Check blood pressure and heart rate at home a few times per week- keep a log with dates and times and bring to office visit- if blood pressure is still running over 140/90 after 3-4 weeks on new medication then call the office    Regular exercise and following a healthy diet encouraged   Follow up with me in 3 months       Scribe's attestation: This note was scribed in the presence of Dr. Beatrice Hatfield M.D. By Devin Dhaliwal RN    The scribes documentation has been prepared under my direction and personally reviewed by me in its entirety. I confirm that the note above accurately reflects all work, treatment, procedures, and medical decision making performed by me. Dr. Beatrice Hatfield MD    Thank you for allowing me to participate in the care of this individual.      Dedrick Gonzalez.  Yelena Tuttle M.D., [facility]

## 2022-02-11 ENCOUNTER — OFFICE VISIT (OUTPATIENT)
Dept: CARDIOLOGY CLINIC | Age: 47
End: 2022-02-11
Payer: COMMERCIAL

## 2022-02-11 VITALS
WEIGHT: 229.6 LBS | DIASTOLIC BLOOD PRESSURE: 84 MMHG | HEART RATE: 77 BPM | SYSTOLIC BLOOD PRESSURE: 136 MMHG | BODY MASS INDEX: 36.9 KG/M2 | HEIGHT: 66 IN | OXYGEN SATURATION: 96 %

## 2022-02-11 DIAGNOSIS — I25.118 CORONARY ARTERY DISEASE INVOLVING NATIVE CORONARY ARTERY OF NATIVE HEART WITH OTHER FORM OF ANGINA PECTORIS (HCC): Primary | Chronic | ICD-10-CM

## 2022-02-11 DIAGNOSIS — E78.5 HYPERLIPIDEMIA, UNSPECIFIED HYPERLIPIDEMIA TYPE: Chronic | ICD-10-CM

## 2022-02-11 DIAGNOSIS — I10 PRIMARY HYPERTENSION: Chronic | ICD-10-CM

## 2022-02-11 PROCEDURE — 99244 OFF/OP CNSLTJ NEW/EST MOD 40: CPT | Performed by: INTERNAL MEDICINE

## 2022-02-11 RX ORDER — ROSUVASTATIN CALCIUM 40 MG/1
TABLET, COATED ORAL
Qty: 90 TABLET | Refills: 3 | Status: SHIPPED | OUTPATIENT
Start: 2022-02-11

## 2022-02-11 RX ORDER — OLMESARTAN MEDOXOMIL 40 MG/1
40 TABLET ORAL DAILY
Qty: 30 TABLET | Refills: 11 | Status: SHIPPED | OUTPATIENT
Start: 2022-02-11 | End: 2022-03-11 | Stop reason: ALTCHOICE

## 2022-02-11 NOTE — PATIENT INSTRUCTIONS
Plan:  Stop Losartan   Start Benicar (Olmesartan) 40 mg daily   Plan to repeat fasting lipids in a few months after consistently taking Crestor and after working on diet and exercise   Cardiac medications reviewed including indications and pertinent side effects    Check blood pressure and heart rate at home a few times per week- keep a log with dates and times and bring to office visit- if blood pressure is still running over 140/90 after 3-4 weeks on new medication then call the office    Regular exercise and following a healthy diet encouraged   Follow up with me in 3 months

## 2022-02-22 ENCOUNTER — APPOINTMENT (OUTPATIENT)
Dept: GENERAL RADIOLOGY | Age: 47
End: 2022-02-22
Payer: COMMERCIAL

## 2022-02-22 ENCOUNTER — APPOINTMENT (OUTPATIENT)
Dept: CT IMAGING | Age: 47
End: 2022-02-22
Payer: COMMERCIAL

## 2022-02-22 ENCOUNTER — HOSPITAL ENCOUNTER (OUTPATIENT)
Age: 47
Setting detail: OBSERVATION
Discharge: HOME | End: 2022-02-24
Attending: EMERGENCY MEDICINE
Payer: COMMERCIAL

## 2022-02-22 DIAGNOSIS — R07.9 CHEST PAIN, UNSPECIFIED TYPE: Primary | ICD-10-CM

## 2022-02-22 DIAGNOSIS — I20.8 STABLE ANGINA PECTORIS (HCC): ICD-10-CM

## 2022-02-22 LAB
A/G RATIO: 1.3 (ref 1.1–2.2)
ALBUMIN SERPL-MCNC: 4.1 G/DL (ref 3.4–5)
ALP BLD-CCNC: 67 U/L (ref 40–129)
ALT SERPL-CCNC: 15 U/L (ref 10–40)
ANION GAP SERPL CALCULATED.3IONS-SCNC: 13 MMOL/L (ref 3–16)
AST SERPL-CCNC: 20 U/L (ref 15–37)
BACTERIA: ABNORMAL /HPF
BASE EXCESS VENOUS: -1.5 MMOL/L (ref -3–3)
BASOPHILS ABSOLUTE: 0 K/UL (ref 0–0.2)
BASOPHILS RELATIVE PERCENT: 0.2 %
BILIRUB SERPL-MCNC: <0.2 MG/DL (ref 0–1)
BILIRUBIN URINE: NEGATIVE
BLOOD, URINE: ABNORMAL
BUN BLDV-MCNC: 21 MG/DL (ref 7–20)
CALCIUM SERPL-MCNC: 9.3 MG/DL (ref 8.3–10.6)
CARBOXYHEMOGLOBIN: 3.4 % (ref 0–1.5)
CHLORIDE BLD-SCNC: 100 MMOL/L (ref 99–110)
CLARITY: CLEAR
CO2: 22 MMOL/L (ref 21–32)
COLOR: YELLOW
CREAT SERPL-MCNC: 0.6 MG/DL (ref 0.6–1.1)
D DIMER: 231 NG/ML DDU (ref 0–229)
EKG ATRIAL RATE: 78 BPM
EKG DIAGNOSIS: NORMAL
EKG P AXIS: 80 DEGREES
EKG P-R INTERVAL: 116 MS
EKG Q-T INTERVAL: 338 MS
EKG QRS DURATION: 80 MS
EKG QTC CALCULATION (BAZETT): 385 MS
EKG R AXIS: 23 DEGREES
EKG T AXIS: 56 DEGREES
EKG VENTRICULAR RATE: 78 BPM
EOSINOPHILS ABSOLUTE: 0 K/UL (ref 0–0.6)
EOSINOPHILS RELATIVE PERCENT: 0.2 %
EPITHELIAL CELLS, UA: ABNORMAL /HPF (ref 0–5)
GFR AFRICAN AMERICAN: >60
GFR NON-AFRICAN AMERICAN: >60
GLUCOSE BLD-MCNC: 104 MG/DL (ref 70–99)
GLUCOSE URINE: NEGATIVE MG/DL
HCG QUALITATIVE: NEGATIVE
HCG(URINE) PREGNANCY TEST: NEGATIVE
HCO3 VENOUS: 20.1 MMOL/L (ref 23–29)
HCT VFR BLD CALC: 37.8 % (ref 36–48)
HEMOGLOBIN: 12.6 G/DL (ref 12–16)
KETONES, URINE: NEGATIVE MG/DL
LEUKOCYTE ESTERASE, URINE: NEGATIVE
LYMPHOCYTES ABSOLUTE: 2.8 K/UL (ref 1–5.1)
LYMPHOCYTES RELATIVE PERCENT: 17.1 %
MCH RBC QN AUTO: 26.2 PG (ref 26–34)
MCHC RBC AUTO-ENTMCNC: 33.3 G/DL (ref 31–36)
MCV RBC AUTO: 78.7 FL (ref 80–100)
METHEMOGLOBIN VENOUS: 0.5 %
MICROSCOPIC EXAMINATION: YES
MONOCYTES ABSOLUTE: 1.1 K/UL (ref 0–1.3)
MONOCYTES RELATIVE PERCENT: 6.5 %
MUCUS: ABNORMAL /LPF
NEUTROPHILS ABSOLUTE: 12.4 K/UL (ref 1.7–7.7)
NEUTROPHILS RELATIVE PERCENT: 76 %
NITRITE, URINE: NEGATIVE
O2 SAT, VEN: 98 %
O2 THERAPY: ABNORMAL
PCO2, VEN: 25.3 MMHG (ref 40–50)
PDW BLD-RTO: 14.5 % (ref 12.4–15.4)
PH UA: 6 (ref 5–8)
PH VENOUS: 7.52 (ref 7.35–7.45)
PLATELET # BLD: 340 K/UL (ref 135–450)
PMV BLD AUTO: 8.6 FL (ref 5–10.5)
PO2, VEN: 116.3 MMHG (ref 25–40)
POTASSIUM SERPL-SCNC: 4.6 MMOL/L (ref 3.5–5.1)
PROTEIN UA: NEGATIVE MG/DL
RBC # BLD: 4.81 M/UL (ref 4–5.2)
RBC UA: ABNORMAL /HPF (ref 0–4)
REASON FOR REJECTION: NORMAL
REJECTED TEST: NORMAL
SARS-COV-2, NAAT: NOT DETECTED
SODIUM BLD-SCNC: 135 MMOL/L (ref 136–145)
SPECIFIC GRAVITY UA: 1.02 (ref 1–1.03)
TCO2 CALC VENOUS: 21 MMOL/L
TOTAL PROTEIN: 7.2 G/DL (ref 6.4–8.2)
TROPONIN: <0.01 NG/ML
TROPONIN: <0.01 NG/ML
URINE REFLEX TO CULTURE: ABNORMAL
URINE TYPE: ABNORMAL
UROBILINOGEN, URINE: 0.2 E.U./DL
WBC # BLD: 16.3 K/UL (ref 4–11)
WBC UA: ABNORMAL /HPF (ref 0–5)

## 2022-02-22 PROCEDURE — 99285 EMERGENCY DEPT VISIT HI MDM: CPT

## 2022-02-22 PROCEDURE — 71260 CT THORAX DX C+: CPT

## 2022-02-22 PROCEDURE — 2580000003 HC RX 258: Performed by: INTERNAL MEDICINE

## 2022-02-22 PROCEDURE — G0378 HOSPITAL OBSERVATION PER HR: HCPCS

## 2022-02-22 PROCEDURE — 6360000002 HC RX W HCPCS: Performed by: INTERNAL MEDICINE

## 2022-02-22 PROCEDURE — 6370000000 HC RX 637 (ALT 250 FOR IP): Performed by: INTERNAL MEDICINE

## 2022-02-22 PROCEDURE — 93005 ELECTROCARDIOGRAM TRACING: CPT | Performed by: EMERGENCY MEDICINE

## 2022-02-22 PROCEDURE — 36415 COLL VENOUS BLD VENIPUNCTURE: CPT

## 2022-02-22 PROCEDURE — 84703 CHORIONIC GONADOTROPIN ASSAY: CPT

## 2022-02-22 PROCEDURE — 96374 THER/PROPH/DIAG INJ IV PUSH: CPT

## 2022-02-22 PROCEDURE — 93010 ELECTROCARDIOGRAM REPORT: CPT | Performed by: INTERNAL MEDICINE

## 2022-02-22 PROCEDURE — 96372 THER/PROPH/DIAG INJ SC/IM: CPT

## 2022-02-22 PROCEDURE — 6370000000 HC RX 637 (ALT 250 FOR IP): Performed by: EMERGENCY MEDICINE

## 2022-02-22 PROCEDURE — 71045 X-RAY EXAM CHEST 1 VIEW: CPT

## 2022-02-22 PROCEDURE — 96375 TX/PRO/DX INJ NEW DRUG ADDON: CPT

## 2022-02-22 PROCEDURE — 87635 SARS-COV-2 COVID-19 AMP PRB: CPT

## 2022-02-22 PROCEDURE — 84484 ASSAY OF TROPONIN QUANT: CPT

## 2022-02-22 PROCEDURE — 85025 COMPLETE CBC W/AUTO DIFF WBC: CPT

## 2022-02-22 PROCEDURE — 6360000002 HC RX W HCPCS: Performed by: EMERGENCY MEDICINE

## 2022-02-22 PROCEDURE — 85379 FIBRIN DEGRADATION QUANT: CPT

## 2022-02-22 PROCEDURE — 6370000000 HC RX 637 (ALT 250 FOR IP)

## 2022-02-22 PROCEDURE — 80053 COMPREHEN METABOLIC PANEL: CPT

## 2022-02-22 PROCEDURE — 81001 URINALYSIS AUTO W/SCOPE: CPT

## 2022-02-22 PROCEDURE — 6360000004 HC RX CONTRAST MEDICATION: Performed by: EMERGENCY MEDICINE

## 2022-02-22 PROCEDURE — 82803 BLOOD GASES ANY COMBINATION: CPT

## 2022-02-22 RX ORDER — MORPHINE SULFATE 2 MG/ML
2 INJECTION, SOLUTION INTRAMUSCULAR; INTRAVENOUS EVERY 4 HOURS PRN
Status: DISCONTINUED | OUTPATIENT
Start: 2022-02-22 | End: 2022-02-24 | Stop reason: HOSPADM

## 2022-02-22 RX ORDER — SODIUM CHLORIDE 0.9 % (FLUSH) 0.9 %
5-40 SYRINGE (ML) INJECTION EVERY 12 HOURS SCHEDULED
Status: DISCONTINUED | OUTPATIENT
Start: 2022-02-22 | End: 2022-02-24 | Stop reason: HOSPADM

## 2022-02-22 RX ORDER — ONDANSETRON 2 MG/ML
4 INJECTION INTRAMUSCULAR; INTRAVENOUS EVERY 6 HOURS PRN
Status: DISCONTINUED | OUTPATIENT
Start: 2022-02-22 | End: 2022-02-24 | Stop reason: HOSPADM

## 2022-02-22 RX ORDER — NITROGLYCERIN 0.4 MG/1
0.4 TABLET SUBLINGUAL EVERY 5 MIN PRN
Status: DISCONTINUED | OUTPATIENT
Start: 2022-02-22 | End: 2022-02-24 | Stop reason: HOSPADM

## 2022-02-22 RX ORDER — ASPIRIN 81 MG/1
81 TABLET, CHEWABLE ORAL DAILY
Status: DISCONTINUED | OUTPATIENT
Start: 2022-02-23 | End: 2022-02-24 | Stop reason: HOSPADM

## 2022-02-22 RX ORDER — POLYETHYLENE GLYCOL 3350 17 G/17G
17 POWDER, FOR SOLUTION ORAL DAILY PRN
Status: DISCONTINUED | OUTPATIENT
Start: 2022-02-22 | End: 2022-02-24 | Stop reason: HOSPADM

## 2022-02-22 RX ORDER — SODIUM CHLORIDE 0.9 % (FLUSH) 0.9 %
5-40 SYRINGE (ML) INJECTION PRN
Status: DISCONTINUED | OUTPATIENT
Start: 2022-02-22 | End: 2022-02-24 | Stop reason: HOSPADM

## 2022-02-22 RX ORDER — ASPIRIN 81 MG/1
324 TABLET, CHEWABLE ORAL ONCE
Status: COMPLETED | OUTPATIENT
Start: 2022-02-22 | End: 2022-02-22

## 2022-02-22 RX ORDER — SODIUM CHLORIDE 9 MG/ML
25 INJECTION, SOLUTION INTRAVENOUS PRN
Status: DISCONTINUED | OUTPATIENT
Start: 2022-02-22 | End: 2022-02-24 | Stop reason: HOSPADM

## 2022-02-22 RX ORDER — ACETAMINOPHEN 500 MG
TABLET ORAL
Status: COMPLETED
Start: 2022-02-22 | End: 2022-02-22

## 2022-02-22 RX ORDER — NITROGLYCERIN 0.4 MG/1
0.4 TABLET SUBLINGUAL EVERY 5 MIN PRN
Status: DISCONTINUED | OUTPATIENT
Start: 2022-02-22 | End: 2022-02-22

## 2022-02-22 RX ORDER — ATENOLOL 25 MG/1
50 TABLET ORAL DAILY
Status: DISCONTINUED | OUTPATIENT
Start: 2022-02-23 | End: 2022-02-24 | Stop reason: HOSPADM

## 2022-02-22 RX ORDER — ACETAMINOPHEN 325 MG/1
650 TABLET ORAL EVERY 6 HOURS PRN
Status: DISCONTINUED | OUTPATIENT
Start: 2022-02-22 | End: 2022-02-24 | Stop reason: HOSPADM

## 2022-02-22 RX ORDER — ACETAMINOPHEN 500 MG
1000 TABLET ORAL ONCE
Status: COMPLETED | OUTPATIENT
Start: 2022-02-22 | End: 2022-02-22

## 2022-02-22 RX ORDER — ASPIRIN 81 MG/1
81 TABLET, CHEWABLE ORAL DAILY
Status: DISCONTINUED | OUTPATIENT
Start: 2022-02-23 | End: 2022-02-22 | Stop reason: SDUPTHER

## 2022-02-22 RX ORDER — ONDANSETRON 4 MG/1
4 TABLET, ORALLY DISINTEGRATING ORAL EVERY 8 HOURS PRN
Status: DISCONTINUED | OUTPATIENT
Start: 2022-02-22 | End: 2022-02-24 | Stop reason: HOSPADM

## 2022-02-22 RX ORDER — MELOXICAM 7.5 MG/1
15 TABLET ORAL DAILY
Status: DISCONTINUED | OUTPATIENT
Start: 2022-02-23 | End: 2022-02-24 | Stop reason: HOSPADM

## 2022-02-22 RX ORDER — ACETAMINOPHEN 650 MG/1
650 SUPPOSITORY RECTAL EVERY 6 HOURS PRN
Status: DISCONTINUED | OUTPATIENT
Start: 2022-02-22 | End: 2022-02-24 | Stop reason: HOSPADM

## 2022-02-22 RX ORDER — ROSUVASTATIN CALCIUM 10 MG/1
40 TABLET, COATED ORAL NIGHTLY
Status: DISCONTINUED | OUTPATIENT
Start: 2022-02-22 | End: 2022-02-24 | Stop reason: HOSPADM

## 2022-02-22 RX ORDER — LOSARTAN POTASSIUM 25 MG/1
50 TABLET ORAL DAILY
Status: DISCONTINUED | OUTPATIENT
Start: 2022-02-23 | End: 2022-02-24 | Stop reason: HOSPADM

## 2022-02-22 RX ORDER — ONDANSETRON 2 MG/ML
4 INJECTION INTRAMUSCULAR; INTRAVENOUS ONCE
Status: COMPLETED | OUTPATIENT
Start: 2022-02-22 | End: 2022-02-22

## 2022-02-22 RX ORDER — LORAZEPAM 2 MG/ML
0.5 INJECTION INTRAMUSCULAR ONCE
Status: COMPLETED | OUTPATIENT
Start: 2022-02-22 | End: 2022-02-22

## 2022-02-22 RX ORDER — MORPHINE SULFATE 2 MG/ML
2 INJECTION, SOLUTION INTRAMUSCULAR; INTRAVENOUS
Status: DISCONTINUED | OUTPATIENT
Start: 2022-02-22 | End: 2022-02-22

## 2022-02-22 RX ADMIN — ROSUVASTATIN CALCIUM 40 MG: 10 TABLET, FILM COATED ORAL at 22:18

## 2022-02-22 RX ADMIN — ASPIRIN 81 MG 324 MG: 81 TABLET ORAL at 12:41

## 2022-02-22 RX ADMIN — ENOXAPARIN SODIUM 30 MG: 30 INJECTION SUBCUTANEOUS at 22:19

## 2022-02-22 RX ADMIN — MORPHINE SULFATE 2 MG: 2 INJECTION, SOLUTION INTRAMUSCULAR; INTRAVENOUS at 21:32

## 2022-02-22 RX ADMIN — ACETAMINOPHEN 1000 MG: 500 TABLET ORAL at 16:42

## 2022-02-22 RX ADMIN — NITROGLYCERIN 0.4 MG: 0.4 TABLET, ORALLY DISINTEGRATING SUBLINGUAL at 16:16

## 2022-02-22 RX ADMIN — IOPAMIDOL 75 ML: 755 INJECTION, SOLUTION INTRAVENOUS at 15:54

## 2022-02-22 RX ADMIN — SODIUM CHLORIDE, PRESERVATIVE FREE 10 ML: 5 INJECTION INTRAVENOUS at 21:35

## 2022-02-22 RX ADMIN — LORAZEPAM 0.5 MG: 2 INJECTION INTRAMUSCULAR; INTRAVENOUS at 12:42

## 2022-02-22 RX ADMIN — Medication 1000 MG: at 16:42

## 2022-02-22 RX ADMIN — NITROGLYCERIN 0.4 MG: 0.4 TABLET, ORALLY DISINTEGRATING SUBLINGUAL at 16:09

## 2022-02-22 RX ADMIN — ONDANSETRON 4 MG: 2 INJECTION INTRAMUSCULAR; INTRAVENOUS at 12:42

## 2022-02-22 ASSESSMENT — PAIN SCALES - GENERAL
PAINLEVEL_OUTOF10: 7
PAINLEVEL_OUTOF10: 5
PAINLEVEL_OUTOF10: 7
PAINLEVEL_OUTOF10: 6
PAINLEVEL_OUTOF10: 5
PAINLEVEL_OUTOF10: 2
PAINLEVEL_OUTOF10: 6

## 2022-02-22 ASSESSMENT — PAIN DESCRIPTION - LOCATION
LOCATION: CHEST
LOCATION: HEAD
LOCATION: CHEST

## 2022-02-22 NOTE — ED PROVIDER NOTES
Patient given to me at signout by attending. Briefly patient presenting for 1 day history of chest pain. States that she felt somewhat unwell yesterday. Has had some nausea, vomiting and diarrhea as well as some shortness of breath and pain with deep inspiration. Recent cortisone injections to the knee. Patient recently seen by cardiologist Dr. Danilo Quiñones. Her emergency department work-up consisted of labs which were unremarkable, 2 - troponins. Mildly elevated D-dimer at 231. Normal chest x-ray. CT of the chest also unremarkable without evidence for acute pulmonary embolus. EKGs obtained were nonischemic. Patient was given aspirin and nitro. Did develop headache and was given some Tylenol. On subsequent evaluations patient reports that her pain has improved although still remains 4 out of 10. Her physical exam was benign without reproducible abdominal tenderness. Heart regular rate and rhythm without murmur, rubs or gallops. Lung fields are clear. No unilateral calf pain or swelling. Vitals have remained stable. Discussed case with on-call cardiologist recommending admission at this time giving ongoing chest pain for stress test tomorrow. Given dose of morphine. Discussed case with hospital medicine regarding admission and orders have been placed. I spoke with Lisa Boo, and [address] We thoroughly discussed the history, physical exam, laboratory and imaging studies, as well as, emergency department course. Based upon that discussion, we've decided to admit Ryan Hernandez to the hospital for further observation, evaluation, treatment. Final Impression  1. Chest pain, unspecified type      Blood pressure 117/60, pulse 82, temperature 98 °F (36.7 °C), temperature source Oral, resp. rate 18, weight 229 lb (103.9 kg), last menstrual period 02/15/2022, SpO2 98 %, not currently breastfeeding.        [place], [place]  02/22/22 9841

## 2022-02-22 NOTE — PROGRESS NOTES
Called on this patient and spoke with Dr. Polo Caceres PA-c in ED. 48year old with CAD with prior PCI LAD, follows with Dr. Yelena Tuttle. Presenting with CP and GI symptoms starting yesterday. CP worsened today. EKG with inferior ST abnormality, PVC's. Trop neg x 1. D dimer elevated, CT neg PE. COVID neg.      Repeat ekg, Trend troponin - if positive or serial EKG changes with CP, start P2Y12, heparin gtt   Continue aspirin/statin/bb  NPO at [place]    Will have Dr. Yelena Tuttle evaluate in Karla Abel MD, [address] [facility]  [phone] [facility]  [phone] [address] [place] [address]

## 2022-02-22 NOTE — ED NOTES
0009 - called [facility] per consult  Re: chest pain  1611 - Dr Renate Jamison called back to speak with Dr Sandrita Mireles  02/22/22 0444

## 2022-02-22 NOTE — ED PROVIDER NOTES
I independently performed a history and physical on [employer]. All diagnostic, treatment, and disposition decisions were made by myself in conjunction with the advanced practice provider. For further details of [address] emergency department encounter, please see FARHAT Mcdowell's documentation. CHIEF COMPLAINT  Chest Pain (heaviness in chest, sob, started 2 days ago, denies cough or fever)      HISTORY OF PRESENT ILLNESS  Sussy Fairbanks is a 48 y.o. female with a history of CAD status post stent who presents to the ED complaining of chest pain. Patient reports that she been developing substernal chest pain 2 days ago. She has noted shortness of breath without cough, chills, or fever. Patient reports left-sided chest pain with radiation to the left neck. She denies lower extremity swelling or edema. Pain is rated 7/10. Luana Maid No other complaints, modifying factors or associated symptoms. I have reviewed the following from the nursing documentation. Past Medical History:   Diagnosis Date    CAD (coronary artery disease)     GERD (gastroesophageal reflux disease) 6/6/2018    Heart attack ([address] 11/19/2016    Hyperlipidemia 12/29/2016    Hypertension     Obesity (BMI 30-39. 9) 8/12/2017     Past Surgical History:   Procedure Laterality Date    APPENDECTOMY      CORONARY ANGIOPLASTY WITH STENT PLACEMENT      KNEE ARTHROSCOPY      KNEE ARTHROSCOPY Left 03/06/2019    medial menisectomy    KNEE ARTHROSCOPY Left 3/6/2019    LEFT KNEE VIDEO ARTHROSCOPY, PARTIAL MEDIAL MENISCECTOMY, CHONDROPLASTY performed by Ruperto Palacios DO at [address] [place] [address] Road      SPINE SURGERY      neck    TONSILLECTOMY       Family History   Problem Relation Age of Onset    Heart Disease Mother     High Blood Pressure Mother     Heart Disease Father 54    High Blood Pressure Father      Social History     Socioeconomic History    Marital status:      Spouse name: Not on file    Number of children: Not on file    Years of education: Not on file    Highest education level: Not on file   Occupational History    Not on file   Tobacco Use    Smoking status: Former Smoker     Packs/day: 0.50     Years: 10.00     Pack years: 5.00     Types: Cigarettes     Quit date: 2016     Years since quittin.3    Smokeless tobacco: Never Used   Vaping Use    Vaping Use: Never used   Substance and Sexual Activity    Alcohol use: Yes     Comment: occasional    Drug use: No    Sexual activity: Yes     Partners: Male   Other Topics Concern    Not on file   Social History Narrative    ** Merged History Encounter **          Social Determinants of Health     Financial Resource Strain: Low Risk     Difficulty of Paying Living Expenses: Not very hard   Food Insecurity: No Food Insecurity    Worried About Running Out of Food in the Last Year: Never true    Rober of Food in the Last Year: Never true   Transportation Needs:     Lack of Transportation (Medical): Not on file    Lack of Transportation (Non-Medical):  Not on file   Physical Activity:     Days of Exercise per Week: Not on file    Minutes of Exercise per Session: Not on file   Stress:     Feeling of Stress : Not on file   Social Connections:     Frequency of Communication with Friends and Family: Not on file    Frequency of Social Gatherings with Friends and Family: Not on file    Attends Temple Services: Not on file    Active Member of Clubs or Organizations: Not on file    Attends Club or Organization Meetings: Not on file    Marital Status: Not on file   Intimate Partner Violence:     Fear of Current or Ex-Partner: Not on file    Emotionally Abused: Not on file    Physically Abused: Not on file    Sexually Abused: Not on file   Housing Stability:     Unable to Pay for Housing in the Last Year: Not on file    Number of Places Lived in the Last Year: Not on file    Unstable Housing in the Last Year: Not on file     Current Facility-Administered Medications   Medication Dose Route Frequency Provider Last Rate Last Admin    LORazepam (ATIVAN) injection 0.5 mg  0.5 mg IntraVENous Once Venetta Foots, DO        aspirin chewable tablet 324 mg  324 mg Oral Once Venetta Foots, DO        [facility]dansetron [facility]) injection 4 mg  4 mg IntraVENous Once Venetta Foots, DO         Current Outpatient Medications   Medication Sig Dispense Refill    olmesartan (BENICAR) 40 MG tablet Take 1 tablet by mouth daily 30 tablet 11    rosuvastatin (CRESTOR) 40 MG tablet TAKE 1 TABLET BY MOUTH IN THE EVENING 90 tablet 3    sulindac (CLINORIL) 200 MG tablet TAKE 1 TABLET BY MOUTH TWO TIMES A DAY 60 tablet 0    levothyroxine (SYNTHROID) 75 MCG tablet TAKE 1 TABLET BY MOUTH ONE TIME A DAY 30 tablet 9    atenolol (TENORMIN) 50 MG tablet Take 1 tablet by mouth daily 30 tablet 1    scopolamine (TRANSDERM-SCOP, 1.5 MG,) transdermal patch Place 1 patch onto the skin every 72 hours (Patient not taking: Reported on 2/11/2022) 10 patch 0    predniSONE (DELTASONE) 5 MG tablet TAKE 10 TABLETS THE FIRST DAY THEN DECREASED BY 1 TABLET EACH DAY UNTIL FINISHED (Patient not taking: Reported on 1/10/2022) 55 tablet 0    meloxicam (MOBIC) 15 MG tablet TAKE 1 TABLET BY MOUTH  DAILY (Patient not taking: Reported on 1/10/2022) 90 tablet 3    diclofenac (VOLTAREN) 50 MG EC tablet TAKE 1 TABLET BY MOUTH TWO TIMES A DAY WITH MEALS (Patient not taking: Reported on 1/10/2022) 60 tablet 0    DM-Doxylamine-Acetaminophen (NYQUIL COLD & FLU PO) Take by mouth (Patient not taking: Reported on 2/11/2022)      diclofenac sodium 1 % GEL Apply 2 g topically 4 times daily 2 Tube 3    ibuprofen (ADVIL;MOTRIN) 800 MG tablet Take 1 tablet by mouth every 8 hours as needed for Pain or Fever 20 tablet 0    ferrous sulfate 325 (65 Fe) MG tablet Take 325 mg by mouth daily (with breakfast)      vitamin D3 (CHOLECALCIFEROL) 400 units TABS tablet Take 2 tablets by mouth daily (Patient taking differently: Take 800 Units by mouth daily 1 tab daily 2/11/22) 30 tablet 0    Ascorbic Acid (VITAMIN C) 500 MG tablet Take 500 mg by mouth daily      aspirin 81 MG tablet Take 81 mg by mouth daily      therapeutic multivitamin-minerals (THERAGRAN-M) tablet Take 1 tablet by mouth daily. No Known Allergies    REVIEW OF SYSTEMS  10 systems reviewed, pertinent positives per HPI otherwise noted to be negative. PHYSICAL EXAM  BP (!) 140/90   Pulse 87   Temp 98 °F (36.7 °C) (Oral)   Resp 20   Wt 229 lb (103.9 kg)   LMP 02/15/2022   SpO2 100%   BMI 36.96 kg/m²   GENERAL APPEARANCE: Awake and alert. Cooperative. No acute distress. Tearful upon arrival.  HEAD: Normocephalic. Atraumatic. EYES: PERRL. EOM's grossly intact. ENT: Mucous membranes are moist.   NECK: Supple, trachea midline. HEART: RRR. Normal S1, S2. No murmurs, rubs or gallops. LUNGS: Respirations unlabored. CTAB. Good air exchange. No wheezes, rales, or rhonchi. Speaking comfortably in full sentences. ABDOMEN: Soft. Non-distended. Non-tender. No guarding or rebound. Normal Bowel sounds. EXTREMITIES: No peripheral edema. MAEE. No acute deformities. SKIN: Warm and dry. No acute rashes. NEUROLOGICAL: Alert and oriented X 3. CN II-XII intact. No gross facial drooping. Strength 5/5, sensation intact. No pronator drift. Normal coordination. Gait normal.   PSYCHIATRIC: Normal mood and affect. Anxious. Tearful. LABS  I have reviewed all labs for this visit.    Results for orders placed or performed during the hospital encounter of 02/22/22   COVID-19, Rapid    Specimen: Nasopharyngeal Swab   Result Value Ref Range    SARS-CoV-2, NAAT Not Detected Not Detected   CBC with Auto Differential   Result Value Ref Range    WBC 16.3 (H) 4.0 - 11.0 K/uL    RBC 4.81 4.00 - 5.20 M/uL    Hemoglobin 12.6 12.0 - 16.0 g/dL    Hematocrit 37.8 36.0 - 48.0 %    MCV 78.7 (L) 80.0 - 100.0 fL    MCH 26.2 26.0 - 34.0 pg    MCHC 33.3 31.0 - 36.0 g/dL    RDW 14.5 12.4 - 15.4 %    Platelets 269 779 - 764 K/uL    MPV 8.6 5.0 - 10.5 fL    Neutrophils % 76.0 %    Lymphocytes % 17.1 %    Monocytes % 6.5 %    Eosinophils % 0.2 %    Basophils % 0.2 %    Neutrophils Absolute 12.4 (H) 1.7 - 7.7 K/uL    Lymphocytes Absolute 2.8 1.0 - 5.1 K/uL    Monocytes Absolute 1.1 0.0 - 1.3 K/uL    Eosinophils Absolute 0.0 0.0 - 0.6 K/uL    Basophils Absolute 0.0 0.0 - 0.2 K/uL   Comprehensive Metabolic Panel   Result Value Ref Range    Sodium 135 (L) 136 - 145 mmol/L    Potassium 4.6 3.5 - 5.1 mmol/L    Chloride 100 99 - 110 mmol/L    CO2 22 21 - 32 mmol/L    Anion Gap 13 3 - 16    Glucose 104 (H) 70 - 99 mg/dL    BUN 21 (H) 7 - 20 mg/dL    CREATININE 0.6 0.6 - 1.1 mg/dL    GFR Non-African American >60 >60    GFR African American >60 >60    Calcium 9.3 8.3 - 10.6 mg/dL    Total Protein 7.2 6.4 - 8.2 g/dL    Albumin 4.1 3.4 - 5.0 g/dL    Albumin/Globulin Ratio 1.3 1.1 - 2.2    Total Bilirubin <0.2 0.0 - 1.0 mg/dL    Alkaline Phosphatase 67 40 - 129 U/L    ALT 15 10 - 40 U/L    AST 20 15 - 37 U/L   Troponin   Result Value Ref Range    Troponin <0.01 <0.01 ng/mL   HCG Qualitative, Serum   Result Value Ref Range    hCG Qual Negative Detects HCG level >10 MIU/mL   Blood gas, venous   Result Value Ref Range    pH, Kg 7.518 (H) 7.350 - 7.450    pCO2, Kg 25.3 (L) 40.0 - 50.0 mmHg    pO2, Kg 116.3 (H) 25.0 - 40.0 mmHg    HCO3, Venous 20.1 (L) 23.0 - 29.0 mmol/L    Base Excess, Kg -1.5 -3.0 - 3.0 mmol/L    O2 Sat, Kg 98 Not Established %    Carboxyhemoglobin 3.4 (H) 0.0 - 1.5 %    MetHgb, Kg 0.5 <1.5 %    TC02 (Calc), Kg 21 Not Established mmol/L    O2 Therapy Unknown    SPECIMEN REJECTION   Result Value Ref Range    Rejected Test DIMER     Reason for Rejection see below    D-Dimer, Quantitative   Result Value Ref Range    D-Dimer, Quant 231 (H) 0 - 229 ng/mL DDU   EKG 12 Lead   Result Value Ref Range    Ventricular Rate 78 BPM    Atrial Rate 78 BPM    P-R Interval 116 ms    QRS Duration 80 ms    Q-T Interval 338 ms    QTc Calculation (Bazett) 385 ms    P Axis 80 degrees    R Axis 23 degrees    T Axis 56 degrees    Diagnosis       Sinus rhythm with frequent Premature ventricular complexesST abnormality, possible digitalis effectAbnormal ECGWhen compared with ECG of 27-FEB-2021 07:53,Premature ventricular complexes are now Present       EKG    Sinus rhythm with frequent PVCs. Normal intervals. Normal durations. QTc 385. Nonspecific ST and T wave changes. Motion artifact secondary crying. Best available. RADIOLOGY  X-RAYS:  I have reviewed radiologic plain film image(s). ALL OTHER NON-PLAIN FILM IMAGES SUCH AS CT, ULTRASOUND AND MRI HAVE BEEN READ BY THE RADIOLOGIST. XR CHEST PORTABLE   Final Result   No acute process. Chart review:    2/26/2021: NM cardiac stress test: No evidence of myocardium at risk for significant reversible ischemia. Rechecks: Physical assessment performed. 1515: Vitals are stable at this time. Blood pressure 150/93. Consult was placed to cardiology. ED COURSE/MDM  Patient seen and evaluated. Old records reviewed. Labs and imaging reviewed and results discussed with patient. Patient was given Ativan, aspirin, and nitroglycerin in the ED with good symptomatic relief. Patient was reassessed as noted above . Patient's EKG, cardiac enzymes and chest x-ray appears stable. Repeat cardiac enzymes were completed. Patient had an elevated D-dimer. CT chest pulmonary performed. . Plan of care discussed with patient and family. Patient and family in agreement with plan. Patient was given scripts for the following medications. I counseled patient how to take these medications. Current Discharge Medication List          CLINICAL IMPRESSION  1. Chest pain, unspecified type        Blood pressure (!) 140/90, pulse 87, temperature 98 °F (36.7 °C), temperature source Oral, resp.  rate 20, weight 229 lb (103.9 kg), last menstrual period 02/15/2022, SpO2 100 %, not currently breastfeeding. DISPOSITION  Sussy Fairbanks was admitted in stable condition.         [facility],   02/23/22 2625

## 2022-02-22 NOTE — H&P
Hospital Medicine History & Physical      PCP: Malcolm Stahl DO    Date of Admission: 2/22/2022    Date of Service: Pt seen/examined on 2/22/2022 and  Placed in Observation. Chief Complaint: Chest pain for 2 days    History Of Present Illness:  ()    48 y.o. female who presented to [facility] with above complaint. Left-sided, pressure-like, associated with shortness of breath and dizziness, no radiation, intensity varies and aggravated by activity and relieved by rest.  She has a history of CAD and stent which was placed  6 years ago. He denies cough noted on fever and mental status nausea vomiting diarrhea or constipation. She went on cruise recently and flew back and forth to [place]. Past Medical History:          Diagnosis Date    CAD (coronary artery disease)     GERD (gastroesophageal reflux disease) 6/6/2018    Heart attack ([address] 11/19/2016    Hyperlipidemia 12/29/2016    Hypertension     Obesity (BMI 30-39. 9) 8/12/2017       Past Surgical History:          Procedure Laterality Date    APPENDECTOMY      CORONARY ANGIOPLASTY WITH STENT PLACEMENT      KNEE ARTHROSCOPY      KNEE ARTHROSCOPY Left 03/06/2019    medial menisectomy    KNEE ARTHROSCOPY Left 3/6/2019    LEFT KNEE VIDEO ARTHROSCOPY, PARTIAL MEDIAL MENISCECTOMY, CHONDROPLASTY performed by Dario Rosario DO at [address] [place] [address]      SPINE SURGERY      neck    TONSILLECTOMY         Medications Prior to Admission:      Prior to Admission medications    Medication Sig Start Date End Date Taking?  Authorizing Provider   olmesartan (BENICAR) 40 MG tablet Take 1 tablet by mouth daily 2/11/22   Neelima Galicia MD   rosuvastatin (CRESTOR) 40 MG tablet TAKE 1 TABLET BY MOUTH IN THE EVENING 2/11/22   Neelima Galicia MD   sulindac (CLINORIL) 200 MG tablet TAKE 1 TABLET BY MOUTH TWO TIMES A DAY 2/8/22   Bryanna Ansari MD   levothyroxine (SYNTHROID) 75 MCG tablet TAKE 1 TABLET BY MOUTH ONE TIME A DAY 1/10/22   Marie Post, DO   atenolol (TENORMIN) 50 MG tablet Take 1 tablet by mouth daily 1/10/22   Marie Post, DO   scopolamine (TRANSDERM-SCOP, 1.5 MG,) transdermal patch Place 1 patch onto the skin every 72 hours  Patient not taking: Reported on 2/11/2022 1/10/22 1/10/23  Marie Post, DO   predniSONE (DELTASONE) 5 MG tablet TAKE 10 TABLETS THE FIRST DAY THEN DECREASED BY 1 TABLET EACH DAY UNTIL FINISHED  Patient not taking: Reported on 1/10/2022 9/14/21   Hiro Chambers MD   meloxicam (MOBIC) 15 MG tablet TAKE 1 TABLET BY MOUTH  DAILY  Patient not taking: Reported on 1/10/2022 1/11/21   Eldonna Juany Hoffmann DO   diclofenac (VOLTAREN) 50 MG EC tablet TAKE 1 TABLET BY MOUTH TWO TIMES A DAY WITH MEALS  Patient not taking: Reported on 1/10/2022 4/6/20   Eldonna Juany Hoffmann DO   DM-Doxylamine-Acetaminophen (NYQUIL COLD & FLU PO) Take by mouth  Patient not taking: Reported on 2/11/2022    Historical Provider, MD   diclofenac sodium 1 % GEL Apply 2 g topically 4 times daily 12/13/19   Eldonna Juany Hoffmann DO   ibuprofen (ADVIL;MOTRIN) 800 MG tablet Take 1 tablet by mouth every 8 hours as needed for Pain or Fever 10/17/18   ALIYAH Allen - CNP   ferrous sulfate 325 (65 Fe) MG tablet Take 325 mg by mouth daily (with breakfast)    Historical Provider, MD   vitamin D3 (CHOLECALCIFEROL) 400 units TABS tablet Take 2 tablets by mouth daily  Patient taking differently: Take 800 Units by mouth daily 1 tab daily 2/11/22 8/14/17   Arthurine CaseyALIYAH - CNP   Ascorbic Acid (VITAMIN C) 500 MG tablet Take 500 mg by mouth daily    Historical Provider, MD   aspirin 81 MG tablet Take 81 mg by mouth daily    Historical Provider, MD   therapeutic multivitamin-minerals (THERAGRAN-M) tablet Take 1 tablet by mouth daily. Historical Provider, MD       Allergies:  Patient has no known allergies.     Social History:      The patient currently lives at home    TOBACCO:   reports that she quit smoking about 5 years ago. Her smoking use included cigarettes. She has a 5.00 pack-year smoking history. She has never used smokeless tobacco.  ETOH:   reports current alcohol use. E-Cigarettes/Vaping Use     Questions Responses    E-Cigarette/Vaping Use Never User    Start Date     Passive Exposure     Quit Date     Counseling Given     Comments             Family History:       Reviewed in detail and negative for DM, CAD, Cancer, CVA. Positive as follows:        Problem Relation Age of Onset    Heart Disease Mother     High Blood Pressure Mother     Heart Disease Father 54    High Blood Pressure Father        REVIEW OF SYSTEMS COMPLETED:   Pertinent positives as noted in the HPI. All other systems reviewed and negative. PHYSICAL EXAM PERFORMED:    /60   Pulse 82   Temp 98 °F (36.7 °C) (Oral)   Resp 18   Wt 229 lb (103.9 kg)   LMP 02/15/2022   SpO2 98%   BMI 36.96 kg/m²     General appearance:  No apparent distress, appears stated age and cooperative. Obese  HEENT:  Normal cephalic, atraumatic without obvious deformity. .  Extra ocular muscles intact. Conjunctivae/corneas clear. Neck: Supple, with full range of motion. No jugular venous distention. Trachea midline. Respiratory:  Normal respiratory effort. Clear to auscultation, bilaterally without Rales/Wheezes/Rhonchi. Cardiovascular:  Regular rate and rhythm with normal S1/S2 without murmurs, rubs or gallops. Abdomen: Soft, non-tender, non-distended with normal bowel sounds. Musculoskeletal:  No clubbing, cyanosis or edema bilaterally. Full range of motion without deformity. Skin: Skin color, texture, turgor normal.  No rashes or lesions. Neurologic:  Neurovascularly intact without any focal sensory/motor deficits.    Psychiatric:  Alert and oriented, thought content appropriate, normal insight  Capillary Refill: Brisk,3 seconds, normal  Peripheral Pulses: +2 palpable, equal bilaterally       Labs:     Recent Labs     02/22/22  1151   WBC 16.3* HGB 12.6   HCT 37.8        Recent Labs     02/22/22  1151   *   K 4.6      CO2 22   BUN 21*   CREATININE 0.6   CALCIUM 9.3     Recent Labs     02/22/22  1151   AST 20   ALT 15   BILITOT <0.2   ALKPHOS 67     No results for input(s): INR in the last 72 hours. Recent Labs     02/22/22  1151   TROPONINI <0.01       Urinalysis:      Lab Results   Component Value Date    NITRU Negative 08/12/2017    WBCUA 3-5 08/26/2014    BACTERIA 1+ 08/26/2014    RBCUA 20-50 08/26/2014    BLOODU large 10/31/2018    BLOODU Negative 08/12/2017    SPECGRAV 1.020 10/31/2018    SPECGRAV >=1.030 08/12/2017    GLUCOSEU neg 10/31/2018    GLUCOSEU Negative 08/12/2017       Radiology:     CXR: I have reviewed the CXR with the following interpretation:   EKG:  I have reviewed the EKG with the following interpretation:   Poor data quality, interpretation may be adversely affectedSinus rhythm with frequent Premature ventricular complexesAbnormal ECGWhen compared with ECG of 27-FEB-2021 07:53,Premature ventricular complexes are now PresentConfirmed by Nancy Lombardi ([id number]) on 2/22/2022    CT CHEST PULMONARY EMBOLISM W CONTRAST   Final Result   No evidence of pulmonary embolism or acute pulmonary abnormality. XR CHEST PORTABLE   Final Result   No acute process.              ASSESSMENT:    Active Hospital Problems    Diagnosis Date Noted    Chest pain [R07.9] 02/22/2022         PLAN:  Chest pain-given the history of CAD stent and aggravated by activity possible angina-rule out ACS-admit telemetry troponin, aspirin statin beta-blocker, n.p.o. midnight, cardiology evaluation  Stress test not ordered-defer to cardiology pulmonary embolism ruled out      Hypertension-continue with Benicar    Hyperlipidemia-Crestor    Hypothyroidism-Synthroid    Obesity-need counseling on weight loss, diet and exercise    Leukocytosis-possibly secondary to recent prednisone versus stress no evidence of infection, repeat      DVT Prophylaxis: Lovenox  Diet: Cardiac, n.p.o. midnight  Code Status: Full code    PT/OT Eval Status:     Dispo -MedSurg telemetry observation       Theresa Pan MD    Thank you Kindra Herbert DO for the opportunity to be involved in this patient's care. If you have any questions or concerns please feel free to contact me at [phone].

## 2022-02-23 ENCOUNTER — APPOINTMENT (OUTPATIENT)
Dept: GENERAL RADIOLOGY | Age: 47
End: 2022-02-23
Payer: COMMERCIAL

## 2022-02-23 ENCOUNTER — APPOINTMENT (OUTPATIENT)
Dept: CARDIAC CATH/INVASIVE PROCEDURES | Age: 47
End: 2022-02-23
Payer: COMMERCIAL

## 2022-02-23 LAB
ANION GAP SERPL CALCULATED.3IONS-SCNC: 12 MMOL/L (ref 3–16)
BUN BLDV-MCNC: 20 MG/DL (ref 7–20)
CALCIUM SERPL-MCNC: 9.1 MG/DL (ref 8.3–10.6)
CHLORIDE BLD-SCNC: 97 MMOL/L (ref 99–110)
CHOLESTEROL, TOTAL: 202 MG/DL (ref 0–199)
CO2: 27 MMOL/L (ref 21–32)
CREAT SERPL-MCNC: 0.7 MG/DL (ref 0.6–1.1)
EKG ATRIAL RATE: 76 BPM
EKG ATRIAL RATE: 77 BPM
EKG DIAGNOSIS: NORMAL
EKG DIAGNOSIS: NORMAL
EKG P AXIS: 57 DEGREES
EKG P AXIS: 62 DEGREES
EKG P-R INTERVAL: 112 MS
EKG P-R INTERVAL: 122 MS
EKG Q-T INTERVAL: 370 MS
EKG Q-T INTERVAL: 388 MS
EKG QRS DURATION: 76 MS
EKG QRS DURATION: 78 MS
EKG QTC CALCULATION (BAZETT): 416 MS
EKG QTC CALCULATION (BAZETT): 439 MS
EKG R AXIS: 20 DEGREES
EKG R AXIS: 44 DEGREES
EKG T AXIS: 52 DEGREES
EKG T AXIS: 63 DEGREES
EKG VENTRICULAR RATE: 76 BPM
EKG VENTRICULAR RATE: 77 BPM
GFR AFRICAN AMERICAN: >60
GFR NON-AFRICAN AMERICAN: >60
GLUCOSE BLD-MCNC: 88 MG/DL (ref 70–99)
GLUCOSE BLD-MCNC: 94 MG/DL (ref 70–99)
HCG QUALITATIVE: NEGATIVE
HCT VFR BLD CALC: 39.8 % (ref 36–48)
HDLC SERPL-MCNC: 43 MG/DL (ref 40–60)
HEMOGLOBIN: 13 G/DL (ref 12–16)
LDL CHOLESTEROL CALCULATED: ABNORMAL MG/DL
LDL CHOLESTEROL DIRECT: 94 MG/DL
LV EF: 58 %
LV EF: 58 %
LV EF: 63 %
LVEF MODALITY: NORMAL
MAGNESIUM: 2.1 MG/DL (ref 1.8–2.4)
MCH RBC QN AUTO: 26.4 PG (ref 26–34)
MCHC RBC AUTO-ENTMCNC: 32.8 G/DL (ref 31–36)
MCV RBC AUTO: 80.5 FL (ref 80–100)
PDW BLD-RTO: 14.7 % (ref 12.4–15.4)
PERFORMED ON: NORMAL
PLATELET # BLD: 317 K/UL (ref 135–450)
PMV BLD AUTO: 8.1 FL (ref 5–10.5)
POTASSIUM REFLEX MAGNESIUM: 4.2 MMOL/L (ref 3.5–5.1)
RBC # BLD: 4.94 M/UL (ref 4–5.2)
SODIUM BLD-SCNC: 136 MMOL/L (ref 136–145)
TRIGL SERPL-MCNC: 435 MG/DL (ref 0–150)
TROPONIN: <0.01 NG/ML
TROPONIN: <0.01 NG/ML
VLDLC SERPL CALC-MCNC: ABNORMAL MG/DL
WBC # BLD: 12.9 K/UL (ref 4–11)

## 2022-02-23 PROCEDURE — 93010 ELECTROCARDIOGRAM REPORT: CPT | Performed by: INTERNAL MEDICINE

## 2022-02-23 PROCEDURE — 96376 TX/PRO/DX INJ SAME DRUG ADON: CPT

## 2022-02-23 PROCEDURE — 6360000002 HC RX W HCPCS

## 2022-02-23 PROCEDURE — 84484 ASSAY OF TROPONIN QUANT: CPT

## 2022-02-23 PROCEDURE — C1894 INTRO/SHEATH, NON-LASER: HCPCS

## 2022-02-23 PROCEDURE — 99214 OFFICE O/P EST MOD 30 MIN: CPT | Performed by: INTERNAL MEDICINE

## 2022-02-23 PROCEDURE — G0378 HOSPITAL OBSERVATION PER HR: HCPCS

## 2022-02-23 PROCEDURE — 2580000003 HC RX 258: Performed by: INTERNAL MEDICINE

## 2022-02-23 PROCEDURE — 84703 CHORIONIC GONADOTROPIN ASSAY: CPT

## 2022-02-23 PROCEDURE — 6370000000 HC RX 637 (ALT 250 FOR IP): Performed by: INTERNAL MEDICINE

## 2022-02-23 PROCEDURE — 80048 BASIC METABOLIC PNL TOTAL CA: CPT

## 2022-02-23 PROCEDURE — 93306 TTE W/DOPPLER COMPLETE: CPT

## 2022-02-23 PROCEDURE — 93005 ELECTROCARDIOGRAM TRACING: CPT | Performed by: STUDENT IN AN ORGANIZED HEALTH CARE EDUCATION/TRAINING PROGRAM

## 2022-02-23 PROCEDURE — 83735 ASSAY OF MAGNESIUM: CPT

## 2022-02-23 PROCEDURE — 80061 LIPID PANEL: CPT

## 2022-02-23 PROCEDURE — 93458 L HRT ARTERY/VENTRICLE ANGIO: CPT

## 2022-02-23 PROCEDURE — 6360000002 HC RX W HCPCS: Performed by: INTERNAL MEDICINE

## 2022-02-23 PROCEDURE — 36415 COLL VENOUS BLD VENIPUNCTURE: CPT

## 2022-02-23 PROCEDURE — 96372 THER/PROPH/DIAG INJ SC/IM: CPT

## 2022-02-23 PROCEDURE — 93005 ELECTROCARDIOGRAM TRACING: CPT

## 2022-02-23 PROCEDURE — C1769 GUIDE WIRE: HCPCS

## 2022-02-23 PROCEDURE — 85027 COMPLETE CBC AUTOMATED: CPT

## 2022-02-23 PROCEDURE — 83721 ASSAY OF BLOOD LIPOPROTEIN: CPT

## 2022-02-23 PROCEDURE — 2709999900 HC NON-CHARGEABLE SUPPLY

## 2022-02-23 PROCEDURE — 6360000002 HC RX W HCPCS: Performed by: STUDENT IN AN ORGANIZED HEALTH CARE EDUCATION/TRAINING PROGRAM

## 2022-02-23 PROCEDURE — 2500000003 HC RX 250 WO HCPCS

## 2022-02-23 PROCEDURE — C1760 CLOSURE DEV, VASC: HCPCS

## 2022-02-23 PROCEDURE — 71045 X-RAY EXAM CHEST 1 VIEW: CPT

## 2022-02-23 RX ORDER — MIDAZOLAM HYDROCHLORIDE 5 MG/ML
INJECTION INTRAMUSCULAR; INTRAVENOUS
Status: COMPLETED | OUTPATIENT
Start: 2022-02-23 | End: 2022-02-23

## 2022-02-23 RX ORDER — SODIUM CHLORIDE 9 MG/ML
25 INJECTION, SOLUTION INTRAVENOUS PRN
Status: DISCONTINUED | OUTPATIENT
Start: 2022-02-23 | End: 2022-02-24 | Stop reason: HOSPADM

## 2022-02-23 RX ORDER — FENTANYL CITRATE 50 UG/ML
INJECTION, SOLUTION INTRAMUSCULAR; INTRAVENOUS
Status: COMPLETED | OUTPATIENT
Start: 2022-02-23 | End: 2022-02-23

## 2022-02-23 RX ORDER — SODIUM CHLORIDE 0.9 % (FLUSH) 0.9 %
5-40 SYRINGE (ML) INJECTION EVERY 12 HOURS SCHEDULED
Status: DISCONTINUED | OUTPATIENT
Start: 2022-02-23 | End: 2022-02-24 | Stop reason: HOSPADM

## 2022-02-23 RX ORDER — SODIUM CHLORIDE 0.9 % (FLUSH) 0.9 %
5-40 SYRINGE (ML) INJECTION PRN
Status: DISCONTINUED | OUTPATIENT
Start: 2022-02-23 | End: 2022-02-24 | Stop reason: HOSPADM

## 2022-02-23 RX ORDER — RANOLAZINE 500 MG/1
500 TABLET, EXTENDED RELEASE ORAL 2 TIMES DAILY
Status: DISCONTINUED | OUTPATIENT
Start: 2022-02-23 | End: 2022-02-24 | Stop reason: HOSPADM

## 2022-02-23 RX ORDER — SODIUM CHLORIDE 9 MG/ML
INJECTION, SOLUTION INTRAVENOUS CONTINUOUS
Status: ACTIVE | OUTPATIENT
Start: 2022-02-23 | End: 2022-02-23

## 2022-02-23 RX ORDER — SODIUM CHLORIDE 9 MG/ML
INJECTION, SOLUTION INTRAVENOUS CONTINUOUS
Status: DISCONTINUED | OUTPATIENT
Start: 2022-02-23 | End: 2022-02-23

## 2022-02-23 RX ORDER — MORPHINE SULFATE 2 MG/ML
1 INJECTION, SOLUTION INTRAMUSCULAR; INTRAVENOUS ONCE
Status: COMPLETED | OUTPATIENT
Start: 2022-02-23 | End: 2022-02-23

## 2022-02-23 RX ADMIN — ROSUVASTATIN CALCIUM 40 MG: 10 TABLET, FILM COATED ORAL at 20:54

## 2022-02-23 RX ADMIN — MORPHINE SULFATE 1 MG: 2 INJECTION, SOLUTION INTRAMUSCULAR; INTRAVENOUS at 10:20

## 2022-02-23 RX ADMIN — MORPHINE SULFATE 2 MG: 2 INJECTION, SOLUTION INTRAMUSCULAR; INTRAVENOUS at 15:13

## 2022-02-23 RX ADMIN — FENTANYL CITRATE 25 MCG: 50 INJECTION, SOLUTION INTRAMUSCULAR; INTRAVENOUS at 13:56

## 2022-02-23 RX ADMIN — NITROGLYCERIN 0.4 MG: 0.4 TABLET, ORALLY DISINTEGRATING SUBLINGUAL at 09:31

## 2022-02-23 RX ADMIN — ENOXAPARIN SODIUM 30 MG: 30 INJECTION SUBCUTANEOUS at 09:33

## 2022-02-23 RX ADMIN — ENOXAPARIN SODIUM 30 MG: 30 INJECTION SUBCUTANEOUS at 20:55

## 2022-02-23 RX ADMIN — MELOXICAM 15 MG: 7.5 TABLET ORAL at 09:30

## 2022-02-23 RX ADMIN — MIDAZOLAM HYDROCHLORIDE 2 MG: 5 INJECTION INTRAMUSCULAR; INTRAVENOUS at 13:56

## 2022-02-23 RX ADMIN — RANOLAZINE 500 MG: 500 TABLET, FILM COATED, EXTENDED RELEASE ORAL at 20:54

## 2022-02-23 RX ADMIN — ACETAMINOPHEN 650 MG: 325 TABLET ORAL at 04:29

## 2022-02-23 RX ADMIN — SODIUM CHLORIDE, PRESERVATIVE FREE 10 ML: 5 INJECTION INTRAVENOUS at 20:55

## 2022-02-23 RX ADMIN — ASPIRIN 81 MG 81 MG: 81 TABLET ORAL at 09:30

## 2022-02-23 RX ADMIN — SODIUM CHLORIDE, PRESERVATIVE FREE 20 ML: 5 INJECTION INTRAVENOUS at 08:24

## 2022-02-23 RX ADMIN — MORPHINE SULFATE 2 MG: 2 INJECTION, SOLUTION INTRAMUSCULAR; INTRAVENOUS at 07:27

## 2022-02-23 RX ADMIN — LOSARTAN POTASSIUM 50 MG: 25 TABLET, FILM COATED ORAL at 09:31

## 2022-02-23 RX ADMIN — SODIUM CHLORIDE: 9 INJECTION, SOLUTION INTRAVENOUS at 14:28

## 2022-02-23 RX ADMIN — FENTANYL CITRATE 25 MCG: 50 INJECTION, SOLUTION INTRAMUSCULAR; INTRAVENOUS at 14:03

## 2022-02-23 RX ADMIN — Medication 10 ML: at 20:57

## 2022-02-23 RX ADMIN — LEVOTHYROXINE SODIUM 75 MCG: 0.05 TABLET ORAL at 06:36

## 2022-02-23 ASSESSMENT — PAIN DESCRIPTION - PROGRESSION
CLINICAL_PROGRESSION: GRADUALLY IMPROVING

## 2022-02-23 ASSESSMENT — PAIN SCALES - GENERAL
PAINLEVEL_OUTOF10: 4
PAINLEVEL_OUTOF10: 0
PAINLEVEL_OUTOF10: 6
PAINLEVEL_OUTOF10: 3
PAINLEVEL_OUTOF10: 3
PAINLEVEL_OUTOF10: 4
PAINLEVEL_OUTOF10: 3
PAINLEVEL_OUTOF10: 5
PAINLEVEL_OUTOF10: 3

## 2022-02-23 ASSESSMENT — PAIN DESCRIPTION - PAIN TYPE
TYPE: ACUTE PAIN
TYPE: CHRONIC PAIN

## 2022-02-23 ASSESSMENT — PAIN DESCRIPTION - LOCATION
LOCATION: CHEST
LOCATION: BACK

## 2022-02-23 ASSESSMENT — PAIN DESCRIPTION - FREQUENCY
FREQUENCY: CONTINUOUS
FREQUENCY: CONTINUOUS

## 2022-02-23 ASSESSMENT — PAIN DESCRIPTION - DESCRIPTORS
DESCRIPTORS: ACHING
DESCRIPTORS: PRESSURE

## 2022-02-23 ASSESSMENT — PAIN DESCRIPTION - ONSET: ONSET: ON-GOING

## 2022-02-23 NOTE — PRE SEDATION
Brief Pre-Op Note/Sedation Assessment      Kenzie Mansfield  1975  [id number]  1:25 PM    Planned Procedure: Cardiac Catheterization Procedure  Post Procedure Plan: Return to same level of care  Consent: I have discussed with the patient and/or the patient representative the indication, alternatives, and the possible risks and/or complications of the planned procedure and the anesthesia methods. The patient and/or patient representative appear to understand and agree to proceed. Chief Complaint:   Chest Pain/Pressure      Indications for Cath Procedure:  1. Presentation:  ACS > 24 hrs  2. Anginal Classification within 2 weeks:  CCS IV - Inability to perform any activity without angina or angina at rest, i.e., severe limitation  3. Angina Symptoms Assessment:  Typical Chest Pain  4. Heart Failure Class within last 2 weeks:  Yes:  Heart Failure Type: Unknown Severity:  Class III - Symptoms of HF on less-than-ordinary exertion  5. Cardiovascular Instability:  Yes:  Persistent ischemic symptoms (CP, ST Elevation)    Prior Ischemic Workup/Eval:  2. Pre-Procedural Medications: Yes: ACE/ARB/ARNI, Aspirin, Beta Blockers and STATIN   Prior to Admission medications    Medication Sig Start Date End Date Taking?  Authorizing Provider   olmesartan (BENICAR) 40 MG tablet Take 1 tablet by mouth daily 2/11/22  Yes Shahnaz Bates MD   rosuvastatin (CRESTOR) 40 MG tablet TAKE 1 TABLET BY MOUTH IN THE EVENING 2/11/22  Yes Shahnaz Bates MD   sulindac (CLINORIL) 200 MG tablet TAKE 1 TABLET BY MOUTH TWO TIMES A DAY 2/8/22  Yes Thomas Rod MD   levothyroxine (SYNTHROID) 75 MCG tablet TAKE 1 TABLET BY MOUTH ONE TIME A DAY 1/10/22  Yes Anton Post, DO   atenolol (TENORMIN) 50 MG tablet Take 1 tablet by mouth daily 1/10/22  Yes Kati Yadav, DO   scopolamine (TRANSDERM-SCOP, 1.5 MG,) transdermal patch Place 1 patch onto the skin every 72 hours 1/10/22 1/10/23 Yes Kati Yadav, DO   vitamin D3 (CHOLECALCIFEROL) 400 units TABS tablet Take 2 tablets by mouth daily  Patient taking differently: Take 800 Units by mouth daily 1 tab daily 2/11/22 8/14/17  Yes ALIYAH Eubanks CNP   aspirin 81 MG tablet Take 81 mg by mouth daily   Yes Historical Provider, MD   therapeutic multivitamin-minerals (THERAGRAN-M) tablet Take 1 tablet by mouth daily. Yes Historical Provider, MD   predniSONE (DELTASONE) 5 MG tablet TAKE 10 TABLETS THE FIRST DAY THEN DECREASED BY 1 TABLET EACH DAY UNTIL FINISHED  Patient not taking: Reported on 1/10/2022 9/14/21   Alycia Tucker MD   meloxicam (MOBIC) 15 MG tablet TAKE 1 TABLET BY MOUTH  DAILY  Patient not taking: Reported on 1/10/2022 1/11/21   Ayush Hoffmann DO   diclofenac (VOLTAREN) 50 MG EC tablet TAKE 1 TABLET BY MOUTH TWO TIMES A DAY WITH MEALS  Patient not taking: Reported on 1/10/2022 4/6/20   Ayush Hoffmann DO   DM-Doxylamine-Acetaminophen (NYQUIL COLD & FLU PO) Take by mouth  Patient not taking: Reported on 2/11/2022    Historical Provider, MD   diclofenac sodium 1 % GEL Apply 2 g topically 4 times daily 12/13/19   Ayush Hoffmann DO   ibuprofen (ADVIL;MOTRIN) 800 MG tablet Take 1 tablet by mouth every 8 hours as needed for Pain or Fever 10/17/18   ALIYAH Shields CNP   ferrous sulfate 325 (65 Fe) MG tablet Take 325 mg by mouth daily (with breakfast)    Historical Provider, MD   Ascorbic Acid (VITAMIN C) 500 MG tablet Take 500 mg by mouth daily    Historical Provider, MD         2. Stress Test Completed? No    Does Patient need surgery?   Cath Valve Surgery:  No    Pre-Procedure Medical History:  Vital Signs:  BP (!) 93/58   Pulse 75   Temp 98.1 °F (36.7 °C) (Oral)   Resp 18   Ht 5' 6\" (1.676 m)   Wt 229 lb 6.4 oz (104.1 kg)   LMP 02/15/2022   SpO2 97%   BMI 37.03 kg/m²     Allergies:  No Known Allergies  Medications:    Current Facility-Administered Medications   Medication Dose Route Frequency Provider Last Rate Last Admin    perflutren lipid microspheres (DEFINITY) injection 1.65 mg  1.5 mL IntraVENous ONCE PRN Antoinette Messer MD        regadenoson [facility]) injection 0.4 mg  0.4 mg IntraVENous ONCE PRN Antoinette Messer MD        0.9 % sodium chloride infusion   IntraVENous Continuous Antoinette Messer MD        nitroGLYCERIN (NITROSTAT) SL tablet 0.4 mg  0.4 mg SubLINGual Q5 Min PRN Jerri Hinojosa MD   0.4 mg at 02/23/22 0931    morphine (PF) injection 2 mg  2 mg IntraVENous Q4H PRN Jerri Hinojosa MD   2 mg at 02/23/22 3254    aspirin chewable tablet 81 mg  81 mg Oral Daily Jerri Hinojosa MD   81 mg at 02/23/22 0930    atenolol (TENORMIN) tablet 50 mg  50 mg Oral Daily Jerri Hinojosa MD        levothyroxine (SYNTHROID) tablet 75 mcg  75 mcg Oral Daily Jerri Hinojosa MD   75 mcg at 02/23/22 0636    meloxicam (MOBIC) tablet 15 mg  15 mg Oral Daily Jerri Hinojosa MD   15 mg at 02/23/22 0930    losartan (COZAAR) tablet 50 mg  50 mg Oral Daily Jerri Hinojosa MD   50 mg at 02/23/22 0931    rosuvastatin (CRESTOR) tablet 40 mg  40 mg Oral Nightly Jerri Hinojosa MD   40 mg at 02/22/22 2218    sodium chloride flush 0.9 % injection 5-40 mL  5-40 mL IntraVENous 2 times per day Jerri Hinojosa MD   20 mL at 02/23/22 0824    sodium chloride flush 0.9 % injection 5-40 mL  5-40 mL IntraVENous PRN Jerri Hinojosa MD        0.9 % sodium chloride infusion  25 mL IntraVENous PRN Jerri Hinojosa MD        ondansetron (ZOFRAN-ODT) disintegrating tablet 4 mg  4 mg Oral Q8H PRN Jerri Hinojosa MD        Or    ondansetron (ZOFRAN) injection 4 mg  4 mg IntraVENous Q6H PRN Jerri Hinojosa MD        acetaminophen (TYLENOL) tablet 650 mg  650 mg Oral Q6H PRN Jerri Hinojosa MD   650 mg at 02/23/22 0937    Or    acetaminophen (TYLENOL) suppository 650 mg  650 mg Rectal Q6H PRN Jerri Hinojosa MD        polyethylene glycol (GLYCOLAX) packet 17 g  17 g Oral Daily PRN MD Cuca Novoa enoxaparin (LOVENOX) injection 30 mg  30 mg SubCUTAneous BID Fannie Sanches MD   30 mg at 02/23/22 5512       Past Medical History:    Past Medical History:   Diagnosis Date    CAD (coronary artery disease)     GERD (gastroesophageal reflux disease) 6/6/2018    Heart attack ([address] 11/19/2016    Hyperlipidemia 12/29/2016    Hypertension     Obesity (BMI 30-39. 9) 8/12/2017       Surgical History:    Past Surgical History:   Procedure Laterality Date    APPENDECTOMY      CORONARY ANGIOPLASTY WITH STENT PLACEMENT      KNEE ARTHROSCOPY      KNEE ARTHROSCOPY Left 03/06/2019    medial menisectomy    KNEE ARTHROSCOPY Left 3/6/2019    LEFT KNEE VIDEO ARTHROSCOPY, PARTIAL MEDIAL MENISCECTOMY, CHONDROPLASTY performed by Jaqueline Servin DO at [address] [place] [address]      SPINE SURGERY      neck    TONSILLECTOMY               Pre-Sedation:  Pre-Sedation Documentation and Exam:  I have assessed the patient and reviewed the H&P on the chart. Prior History of Anesthesia Complications:   none    Modified Mallampati:  II (soft palate, uvula, fauces visible)    ASA Classification:  Class 4 - A patient with an incapacitating systemic disease that is a constant threat to life    Iman Scale: Activity:  2 - Able to move 4 extremities voluntarily on command  Respiration:  2 - Able to breathe deeply and cough freely  Circulation:  2 - BP+/- 20mmHg of normal  Consciousness:  2 - Fully awake  Oxygen Saturation (color):  2 - Able to maintain oxygen saturation >92% on room air    Sedation/Anesthesia Plan:  Guard the patient's safety and welfare. Minimize physical discomfort and pain. Minimize negative psychological responses to treatment by providing sedation and analgesia and maximize the potential amnesia. Patient to meet pre-procedure discharge plan.     Medication Planned:  midazolam intravenously and fentanyl intravenously    Patient is an appropriate candidate for plan of sedation: yes      Electronically signed by Germaine Daigle MD on 2/23/2022 at 1:25 PM

## 2022-02-23 NOTE — PROCEDURES
[address]       Cardiac Catheterization Lab Report    PATIENT: Carole Rodriguez  DATE: 2022  MRN: [id number]  CSN: [id number]  : 1975      Performing Physician: Marcos Gonzalez MD, [facility]  Primary Care Physician: Trinh Flores DO  Admitting Provider: Julián Pulido MD    Procedures Performed:   1. Left heart catheterization  2. Selective left and right coronary angiogram  3. Left ventriculography   4. Mynx    Procedure Findings:  1. Mild non-obstructive coronary artery disease. ~patent LAD stent  2. Normal left ventricular function with EF estimated at 55-60%  3. Normal left heart hemodynamics    Indications:   Patient Active Problem List   Diagnosis    Hypertension    CAD s/p NIKOLE to mid-LAD (2016)    Chronic mediastinal lymphadenopathy (noted 2016)    Hyperlipidemia    Obesity (BMI 30-39. 5)    Former smoker    Chronic fatigue    Acute non-recurrent frontal sinusitis    GERD (gastroesophageal reflux disease)    ABHILASH on CPAP    Contusion of right knee    Strain of left hamstring    Loose body in knee, left knee    Acute medial meniscus tear of left knee    S/P arthroscopic partial medial meniscectomy    Chronic pain of left knee    Effusion of left knee    Chest pain       Details:   Carole Rodriguez was brought to the cardiac catheterization lab in a fasting state after informed consent was obtained. If the patient was able to provide written consent, it was obtained. The patient's vitals were monitored through out the procedure. The patient was sedated using the appropriate levels of sedation and ASA guidelines. The appropriate access site area was prepped and drapped in a sterile fashion. The area was anesthetized with 2% lidocaine. Using the modified Seldinger technique, an arterial sheath was introduced into the arterial access site using a single anterior wall puncture.  The sheath was flushed and prepped in usual fashion. Catheters used during the procedure included 5 Guyanese TIG 4.0 catheter. The catheters were advanced and removed over a .035\" wire, into the appropriate positions. Multiple angiographic views were obtained. An LV gram was obtained. Findings:    1. Left main coronary artery was normal. It gave off the left anterior descending artery and left circumflex. 2. Left anterior descending artery has mild atherosclerotic disease. It was moderate in size. It gave off septal perforators and a moderate sized diagonal branch. The LAD covered the entire apex of the left ventricle. ~There is a stent within the proximal and mid LAD. The stent is patent and has very minimal in-stent restenosis. 3. Left circumflex has mild atherosclerotic disease. It was moderate in size. There was a moderate sized obtuse marginal branch. 4. Right coronary artery has mild atherosclerotic disease. It was moderate in size and was the dominant artery. 5. Left ventriculogram showed normal LVEF at 55-60%. Wall motion was normal . There was no significant mitral valve or aortic valve disease noted. LVEDP was normal. There was no gradient noted across the aortic valve during pullback of the catheter. BP (!) 93/58   Pulse 75   Temp 98.1 °F (36.7 °C) (Oral)   Resp 18   Ht 5' 6\" (1.676 m)   Wt 229 lb 6.4 oz (104.1 kg)   LMP 02/15/2022   SpO2 97%   BMI 37.03 kg/m²     The access site was controlled with manual pressure and/or appropriate closure device. Moderate Conscious Sedation Details: An independent trained observer pushed medications at my direction. We monitoring the patient's level of consciousness and vital signs/physiologic status throughout the procedure.   CPT codes 92383 and 27426      Start time: 1350  Stop time: 1410  ASA class: 3    Sedation totals:  Versad - 2mg  Fentanyl - 50mcg    EBL - minimal <5 cc blood loss    The patient was monitored continuously with the ECG, pulse oximetry, blood pressure, and direct observation. CONCLUSIONS:    1. Mild non-obstructive coronary artery disease with preserved LVEF    ASSESSMENT/RECOMMENDATIONS:    1. Medical management of the patient's coronary disease.   2.  We will add Ranexa 500 mg twice daily for additional antianginal therapy      Yvonne Mena MD, LUCIE, [facility], [address] [place] [address]  [phone] Main central office  [phone] [facility] office  2/23/2022  2:20 PM

## 2022-02-23 NOTE — CODE DOCUMENTATION
Morphine 1mg ordered for pts pain. Fall instructions given. Pt verbalized understanding. End 1020, pt to remain at same level of care. Stress test ordered. rn and md to follow trop and mag results. And review all date to adjust poc. Pt stable for now. Aleida was at bedside and spoke with pt.

## 2022-02-23 NOTE — CONSULTS
[address]                                  CONSULTATION    PATIENT NAME: Deon Paniagua                  :        1975  MED REC NO:   [id number]                          ROOM:       [id number]  ACCOUNT NO:   [de-identified]                           ADMIT DATE: 2022  PROVIDER:     [place] Hernandez. Genny Blake MD    CONSULT DATE:  2022    REASON FOR CONSULTATION:  Chest pain. HISTORY OF PRESENT ILLNESS:  A 49-year-old female with history of  coronary artery disease, presented to the hospital with multiple  complaints, chief of which is chest pain. She states over the past two  to three days, she developed worsening chest pain. She has chronic  chest pain, which had been stable until the past few days. It is now  much more severe, different than her usual chest pain. It feels like a  tightness, wheezing sensation in the center of her chest radiates into  her neck and shoulders. It is intermittent. No precipitating factor. No relieving factor, its worse, it is 7/10, lasts for hours at a time. She also notes general malaise, nausea, vomiting, diarrhea, weakness,  night sweats, and shortness of breath. She denies fevers or cough. PAST MEDICAL HISTORY:  1. Coronary artery disease status post angioplasty with stenting in the  past.  Most recent stress test approximately one year ago, which was  without ischemia. 2.  Hypertension. 3.  Hyperlipidemia. SOCIAL HISTORY:  She is a former smoker. FAMILY HISTORY:  Positive for heart disease. REVIEW OF SYSTEMS:  No focal neurologic symptoms. No headache. No  visual changes. No syncope. She has been having some palpitations. No  rash. All other systems were negative except as present illness. ALLERGIES:  No known drug allergies. MEDICATIONS:  See list in the chart, which I have reviewed.     PHYSICAL EXAMINATION:  VITALS:  Blood pressure is 110/70, heart rate 69, respirations 18,  temperature is 98.2. She is 96% saturated on room air. GENERAL:  Well-developed, well-nourished white female, in no acute  distress. HEENT:  Normocephalic and atraumatic. Oropharynx clear. Moist mucous  membranes. NECK:  Supple. CHEST:  Clear. CARDIAC:  Regular S1 and S2. There is no S3 or S4 gallop. There is no  significant murmur. Jugular venous pressure is normal.  Carotids are 2+  and symmetric without bruit. ABDOMEN:  Soft and nontender. Positive bowel sounds. EXTREMITIES:  No cyanosis or edema. NEUROLOGIC:  Grossly nonfocal.  SKIN:  Warm and dry. PSYCHIATRY:  Affect calm. LABORATORY DATA:  EKG, sinus rhythm. Premature ventricular  contractions, nonspecific ST-segment abnormalities. Troponin less than  0.01. Hemoglobin 13. White blood cell count 16.3. COVID not detected. Chest x-ray negative for pulmonary edema. Telemetry normal sinus  rhythm. IMPRESSION:  1. Chest pain with typical and atypical features. 2.  Coronary artery disease, history of angioplasty and stent in the  past.  Most recent stress test approximately one year ago without  ischemia. 3.  Hypertension, stable. 4.  Hyperlipidemia, stable. 5.  Former smoker. 6.  General malaise with multiple constitutional symptoms. RECOMMENDATIONS:  1. Stress test.  2.  Echocardiogram.        CANDACE Plaza MD    D: 02/23/2022 9:09:40       T: 02/23/2022 10:01:51     [email or web address]  Job#: [id number]     Doc#: [id number]    CC:

## 2022-02-23 NOTE — PROGRESS NOTES
Hospitalist Progress Note      PCP: Chico Pereira DO    Date of Admission: 2/22/2022    Chief Complaint: chest pain    Hospital Course: reviewed. 48 yoF with hisotry of CAD s/p NIKOLE 6 years ago who presents with atypical chest pain concerning for cardiac etiology. Hospital course complicated by acute worsening of chest discomfort with hypotension perhaps iatrogenic from medications (nitro and morphine). Now HDS awaiting Ehco and further cardiac studies. Subjective: rapid response called for chest pain and hypotension after receiving nitro and morphine. sxs are now improving but she endorse chest pain with jaw pain. Chest pain described as squeezing in nature. Feels slight dyspneic and diaphoretic with nausea. No dyspepsia. Prior to this episode her chest discomfort has been on and off since Sunday with nausea and emesis (x 1 in ED). No abdominal pain. No h/o DVT/PE.        Medications:  Reviewed    Infusion Medications    sodium chloride       Scheduled Medications    morphine  1 mg IntraVENous Once    aspirin  81 mg Oral Daily    atenolol  50 mg Oral Daily    levothyroxine  75 mcg Oral Daily    meloxicam  15 mg Oral Daily    losartan  50 mg Oral Daily    rosuvastatin  40 mg Oral Nightly    sodium chloride flush  5-40 mL IntraVENous 2 times per day    enoxaparin  30 mg SubCUTAneous BID     PRN Meds: perflutren lipid microspheres, regadenoson, nitroGLYCERIN, morphine, sodium chloride flush, sodium chloride, ondansetron **OR** ondansetron, acetaminophen **OR** acetaminophen, polyethylene glycol    No intake or output data in the 24 hours ending 02/23/22 1015    Physical Exam Performed:    /84   Pulse (!) 43   Temp 98.1 °F (36.7 °C) (Oral)   Resp 20   Ht 5' 6\" (1.676 m)   Wt 229 lb 6.4 oz (104.1 kg)   LMP 02/15/2022   SpO2 96%   BMI 37.03 kg/m²     General appearance: in distress uncomfortable, diaphoretic, labored breathing but improving  HEENT: Pupils equal, round, and reactive to light. Conjunctivae/corneas clear. Neck: Supple, with full range of motion. Respiratory:  Increased respiratory effort. Clear to auscultation, bilaterally without Rales/Wheezes/Rhonchi. On non rebreather but transitioned to 3L NC with 100% saO2. Cardiovascular: Regular rate and rhythm with normal S1/S2 without murmurs, rubs or gallops. 2+ radial pulsus  Abdomen: Soft, non-tender, non-distended with normal bowel sounds. Musculoskeletal: No clubbing, cyanosis or edema bilaterally. Full range of motion without deformity. Skin: Skin color, texture, turgor normal.  No rashes or lesions. Neurologic:  Neurovascularly intact without any focal sensory/motor deficits. Cranial nerves: II-XII intact, grossly non-focal.  Psychiatric: Alert and oriented, thought content appropriate, normal insight  Capillary Refill: Brisk,3 seconds, normal   Peripheral Pulses: +2 palpable, equal bilaterally       Labs:   Recent Labs     02/22/22  1151 02/23/22  0741   WBC 16.3* 12.9*   HGB 12.6 13.0   HCT 37.8 39.8    317     Recent Labs     02/22/22  1151 02/23/22  0741   * 136   K 4.6 4.2    97*   CO2 22 27   BUN 21* 20   CREATININE 0.6 0.7   CALCIUM 9.3 9.1     Recent Labs     02/22/22  1151   AST 20   ALT 15   BILITOT <0.2   ALKPHOS 67     No results for input(s): INR in the last 72 hours. Recent Labs     02/22/22  1151 02/22/22  2124 02/23/22  0024   TROPONINI <0.01 <0.01 <0.01       Urinalysis:      Lab Results   Component Value Date    NITRU Negative 02/22/2022    WBCUA 3-5 02/22/2022    BACTERIA 1+ 02/22/2022    RBCUA 5-10 02/22/2022    BLOODU TRACE-INTACT 02/22/2022    SPECGRAV 1.020 02/22/2022    GLUCOSEU Negative 02/22/2022       Radiology:  XR CHEST PORTABLE   Final Result   No acute abnormality. CT CHEST PULMONARY EMBOLISM W CONTRAST   Final Result   No evidence of pulmonary embolism or acute pulmonary abnormality. XR CHEST PORTABLE   Final Result   No acute process.          NM Cardiac Stress Test Nuclear Imaging    (Results Pending)           Assessment/Plan:    Active Hospital Problems    Diagnosis     Chest pain [R07.9]      Atypical chest pain- heart score 5 (high risk story with >3 risk factors and H/o PCI/NIKOLE LAD 11/2016). However her EKG without ST elevations or depressions, trop neg x 3. Though she is high risk her current diagnostic work up does not align well with ACS chest pain (keep in mind trops and EKG can lag behind clinical course). Echo will be very telling which is in process now. Alternative non acs but cardiac etiologies include: Angina, valvular pathology (AS, MV though previous echos do not show this), aortic pathology (CAT PE without thoracic aorta abnormalities) myocarditis or pericarditis (leukocytosis but no infectious sxs), arrhthymias (no events on tele). Non cardiac etiologies to entertain include PE (CTA PE normal), PTX (but cxr with normal and exam with bilateral lung sounds), no fever or pulmonary sxs to suggest pneumonia. Life threatening Gi causes such as esophageal rupture or Gi obstruction does not correlate with current clinical presentation. - plan for echo and possibly stress test. I will discuss the above with cardiology. Hypertension-continue with Benicar     Hyperlipidemia-Crestor     Hypothyroidism-Synthroid     Obesity-need counseling on weight loss, diet and exercise     Leukocytosis-possibly secondary to recent prednisone though today pt made it sound like steroids were local. Need to clarify if systemic steroids received. versus stress/inflamamtion no evidence of infection.  Repeat cbc down trending    DVT Prophylaxis: enoxpaarin  Diet: Diet NPO  Diet NPO  Code Status: Full Code    PT/OT Eval Status: not ordered    Dispo - pending cardiology plan    Jv Watson MD

## 2022-02-23 NOTE — FLOWSHEET NOTE
supported team/pt during rapid response. Pt welcomed brief conversation at pt's bedside where she also invited prayer. Pt has a boy friend and some siblings to support her. The pt seemed more calm after the crisis.  will follow in day for continuing support. 02/23/22 1112   Encounter Summary   Services provided to: Patient   Continue Visiting   (2/23 - Supported team/pt during rapid response, prayer)   Complexity of Encounter Moderate   Length of Encounter 30 minutes   Crisis   Type Rapid response   Assessment Anxious; Approachable   Intervention Nurtured hope;Prayer;Sustaining presence/ Ministry of presence   Outcome Expressed gratitude;Receptive; Expressed feelings/needs/concerns; Less anxious, less agitated

## 2022-02-23 NOTE — PROGRESS NOTES
4 Eyes Skin Assessment     The patient is being assess for   Admission    I agree that 2 RN's have performed a thorough Head to Toe Skin Assessment on the patient. ALL assessment sites listed below have been assessed. Areas assessed for pressure by both nurses:   [x]   Head, Face, and Ears   [x]   Shoulders, Back, and Chest, Abdomen  [x]   Arms, Elbows, and Hands   [x]   Coccyx, Sacrum, and Ischium  [x]   Legs, Feet, and Heels        Skin Assessed Under all Medical Devices by both nurses:           All Mepilex Borders were peeled back and area peeked at by both nurses:    Please list where Mepilex Borders are located:   none             **SHARE this note so that the co-signing nurse is able to place an eSignature**    Co-signer eSignature:     Does the Patient have Skin Breakdown related to pressure?   no             Surya Prevention initiated:  no  Wound Care Orders initiated: no      [facility] nurse consulted for Pressure Injury (Stage 3,4, Unstageable, DTI, NWPT, Complex wounds)and New or Established Ostomies:  no    Primary Nurse eSignature: Tasha Turcios RN  23:00 pm 02/22/22

## 2022-02-23 NOTE — CODE DOCUMENTATION
Serial troponins have been negative since admission. Lab here to drawl pt now. Mag and troponin ordered and nurse to follow.

## 2022-02-23 NOTE — PROGRESS NOTES
Called to pt.s room with diaphoresis, chest pain, sob per aide. Vitals obtained, bp 73/50, put bed in trendelenburg position, pt. Alert & oriented x4. Oxygen applied to help relieve symptoms, fluids set up at bedside. STAT CXR, trop, mg, ekg obtained. Rapid response called. Staff at bedside.

## 2022-02-23 NOTE — POST SEDATION
Patient:  Laura Jimenez   :   1975    A pre-sedation re-evaluation was performed immediately at the end of the procedure. Procedure:  Cardiac cath  Medications: Procedural sedation with minimal conscious sedation  Complications: None  Estimated Blood Loss: none  Specimens: Were not obtained        Alice Medication and Procedural Reconciliation:  I agree that the documented medications and procedures performed are true. The medications were given under my order. The procedures were performed under my direct supervision.

## 2022-02-23 NOTE — CARE COORDINATION
Pt asleep when CM attempted to assess. Per chart review, pt in observation status for evaluation of chest pain. Pt has PCP and insurance. Trop neg x 3. Stress test and echo ordered/pending. No needs identified. Please consult CM team if needs arise.

## 2022-02-23 NOTE — CODE DOCUMENTATION
melanie spoke with cardio. Pt was half through echo and developed cp. Pt was diaphoretic with jaw and cp, a.m. meds were given as ordered. Pt complained of cp and Prn nitro given, as previously ordered. bp dropped to sbp 70's. Trendelenburg positioned, prior rapid response. md reviewed ekg with cardio over the phone [place], they ordered troponin, previous serials were negative. cxr waiting results. Stress ordered. Assessment continues by zoë. Oxygen supplement remains in place.  3l nc

## 2022-02-24 VITALS
BODY MASS INDEX: 36.14 KG/M2 | RESPIRATION RATE: 18 BRPM | OXYGEN SATURATION: 99 % | SYSTOLIC BLOOD PRESSURE: 134 MMHG | WEIGHT: 224.9 LBS | DIASTOLIC BLOOD PRESSURE: 78 MMHG | TEMPERATURE: 98.2 F | HEART RATE: 77 BPM | HEIGHT: 66 IN

## 2022-02-24 LAB
A/G RATIO: 1.4 (ref 1.1–2.2)
ALBUMIN SERPL-MCNC: 3.8 G/DL (ref 3.4–5)
ALP BLD-CCNC: 58 U/L (ref 40–129)
ALT SERPL-CCNC: 11 U/L (ref 10–40)
ANION GAP SERPL CALCULATED.3IONS-SCNC: 10 MMOL/L (ref 3–16)
AST SERPL-CCNC: 9 U/L (ref 15–37)
BASOPHILS ABSOLUTE: 0 K/UL (ref 0–0.2)
BASOPHILS RELATIVE PERCENT: 0.1 %
BILIRUB SERPL-MCNC: <0.2 MG/DL (ref 0–1)
BUN BLDV-MCNC: 19 MG/DL (ref 7–20)
CALCIUM SERPL-MCNC: 8.6 MG/DL (ref 8.3–10.6)
CHLORIDE BLD-SCNC: 97 MMOL/L (ref 99–110)
CO2: 29 MMOL/L (ref 21–32)
CREAT SERPL-MCNC: 0.7 MG/DL (ref 0.6–1.1)
EOSINOPHILS ABSOLUTE: 0.5 K/UL (ref 0–0.6)
EOSINOPHILS RELATIVE PERCENT: 4.6 %
GFR AFRICAN AMERICAN: >60
GFR NON-AFRICAN AMERICAN: >60
GLUCOSE BLD-MCNC: 109 MG/DL (ref 70–99)
HCT VFR BLD CALC: 35.8 % (ref 36–48)
HEMOGLOBIN: 11.9 G/DL (ref 12–16)
LYMPHOCYTES ABSOLUTE: 1.8 K/UL (ref 1–5.1)
LYMPHOCYTES RELATIVE PERCENT: 16.6 %
MCH RBC QN AUTO: 26.4 PG (ref 26–34)
MCHC RBC AUTO-ENTMCNC: 33.2 G/DL (ref 31–36)
MCV RBC AUTO: 79.5 FL (ref 80–100)
MONOCYTES ABSOLUTE: 0.8 K/UL (ref 0–1.3)
MONOCYTES RELATIVE PERCENT: 7.5 %
NEUTROPHILS ABSOLUTE: 7.8 K/UL (ref 1.7–7.7)
NEUTROPHILS RELATIVE PERCENT: 71.2 %
PDW BLD-RTO: 14.5 % (ref 12.4–15.4)
PLATELET # BLD: 268 K/UL (ref 135–450)
PMV BLD AUTO: 8.2 FL (ref 5–10.5)
POTASSIUM REFLEX MAGNESIUM: 4.3 MMOL/L (ref 3.5–5.1)
RBC # BLD: 4.5 M/UL (ref 4–5.2)
SODIUM BLD-SCNC: 136 MMOL/L (ref 136–145)
TOTAL PROTEIN: 6.5 G/DL (ref 6.4–8.2)
WBC # BLD: 11 K/UL (ref 4–11)

## 2022-02-24 PROCEDURE — G0378 HOSPITAL OBSERVATION PER HR: HCPCS

## 2022-02-24 PROCEDURE — 85025 COMPLETE CBC W/AUTO DIFF WBC: CPT

## 2022-02-24 PROCEDURE — 2580000003 HC RX 258: Performed by: INTERNAL MEDICINE

## 2022-02-24 PROCEDURE — 99214 OFFICE O/P EST MOD 30 MIN: CPT | Performed by: NURSE PRACTITIONER

## 2022-02-24 PROCEDURE — 6360000002 HC RX W HCPCS: Performed by: INTERNAL MEDICINE

## 2022-02-24 PROCEDURE — 96372 THER/PROPH/DIAG INJ SC/IM: CPT

## 2022-02-24 PROCEDURE — 6370000000 HC RX 637 (ALT 250 FOR IP): Performed by: INTERNAL MEDICINE

## 2022-02-24 PROCEDURE — 36415 COLL VENOUS BLD VENIPUNCTURE: CPT

## 2022-02-24 PROCEDURE — 80053 COMPREHEN METABOLIC PANEL: CPT

## 2022-02-24 RX ORDER — NITROGLYCERIN 0.4 MG/1
TABLET SUBLINGUAL
Qty: 25 TABLET | Refills: 3 | Status: SHIPPED | OUTPATIENT
Start: 2022-02-24

## 2022-02-24 RX ORDER — RANOLAZINE 500 MG/1
500 TABLET, EXTENDED RELEASE ORAL 2 TIMES DAILY
Qty: 60 TABLET | Refills: 3 | Status: SHIPPED | OUTPATIENT
Start: 2022-02-24 | End: 2022-04-29 | Stop reason: SDUPTHER

## 2022-02-24 RX ADMIN — LOSARTAN POTASSIUM 50 MG: 25 TABLET, FILM COATED ORAL at 08:41

## 2022-02-24 RX ADMIN — MELOXICAM 15 MG: 7.5 TABLET ORAL at 08:40

## 2022-02-24 RX ADMIN — ATENOLOL 50 MG: 25 TABLET ORAL at 08:41

## 2022-02-24 RX ADMIN — ASPIRIN 81 MG 81 MG: 81 TABLET ORAL at 08:41

## 2022-02-24 RX ADMIN — LEVOTHYROXINE SODIUM 75 MCG: 0.05 TABLET ORAL at 05:54

## 2022-02-24 RX ADMIN — Medication 10 ML: at 08:41

## 2022-02-24 RX ADMIN — RANOLAZINE 500 MG: 500 TABLET, FILM COATED, EXTENDED RELEASE ORAL at 08:40

## 2022-02-24 RX ADMIN — ENOXAPARIN SODIUM 30 MG: 30 INJECTION SUBCUTANEOUS at 08:41

## 2022-02-24 ASSESSMENT — PAIN SCALES - GENERAL
PAINLEVEL_OUTOF10: 3
PAINLEVEL_OUTOF10: 0

## 2022-02-24 NOTE — PROGRESS NOTES
[facility]   Daily Progress Note    Admit Date:  2/22/2022  HPI:    Chief Complaint   Patient presents with    Chest Pain     heaviness in chest, sob, started 2 days ago, denies cough or fever      Sussy Yung presented with symptoms of chest pain that have been worsening over the past 2 to 3 days, a tightness sensation as well as a wheezing in the center of her chest which radiated to her neck and shoulders. She also reported symptoms of fatigue, nausea, vomiting, diarrhea, weakness, night sweats, dyspnea. ECGs and troponins were negative for ischemia. Cardiology consulted for chest pain. She has a history of CAD with prior PCI to the LAD in 2016, hypertension, hyperlipidemia. Due to persistent chest pain with hypotension she underwent urgent LHC which revealed mild nonobstructive CAD and patent LAD stent with minimal in-stent restenosis. Started on Ranexa as an antianginal treatment. Subjective:  Ms. Kathleen Watkins lying in bed, reports feels much improved. Her chest heaviness has improved and nearly resolved. She is grateful that cardiac status is stable.     Objective:   Patient Vitals for the past 24 hrs:   BP Temp Temp src Pulse Resp SpO2 Weight   02/24/22 0834 134/78 98.2 °F (36.8 °C) Oral 77 18 99 % --   02/24/22 0557 -- -- -- -- -- -- 224 lb 14.4 oz (102 kg)   02/24/22 0415 116/69 98.6 °F (37 °C) Oral 78 18 98 % --   02/24/22 0100 102/63 98.4 °F (36.9 °C) Oral 80 20 96 % --   02/23/22 2215 107/64 -- -- 83 -- -- --   02/23/22 2045 111/69 98.6 °F (37 °C) Oral 82 22 96 % --   02/23/22 1630 114/77 97.8 °F (36.6 °C) Oral 77 18 97 % --   02/23/22 1610 121/82 -- -- 74 18 99 % --   02/23/22 1530 124/75 -- -- 71 19 99 % --   02/23/22 1515 118/81 -- -- 69 18 100 % --   02/23/22 1507 118/73 -- -- 67 18 99 % --   02/23/22 1445 128/86 98.1 °F (36.7 °C) Oral 73 18 99 % --   02/23/22 1430 121/74 98.2 °F (36.8 °C) Oral 73 18 98 % --   02/23/22 1228 (!) 93/58 98.1 °F (36.7 °C) Oral 75 18 97 % -- 02/23/22 1012 130/84 -- -- (!) 43 -- -- --   02/23/22 1007 -- -- -- -- -- 96 % --   02/23/22 1005 115/75 -- -- 78 20 99 % --   02/23/22 0955 (!) 134/94 -- -- 72 -- -- --   02/23/22 0951 (!) 73/50 -- -- -- -- 94 % --       Intake/Output Summary (Last 24 hours) at 2/24/2022 0918  Last data filed at 2/23/2022 1049  Gross per 24 hour   Intake 0 ml   Output --   Net 0 ml     Wt Readings from Last 3 Encounters:   02/24/22 224 lb 14.4 oz (102 kg)   02/11/22 229 lb 9.6 oz (104.1 kg)   01/10/22 230 lb (104.3 kg)         ASSESSMENT:   1. Chest pain: EKG and troponins negative for ACS  2. CAD: History of PCI to the LAD in 2016, repeat [facility] with no obstructive CAD, patent stent, on aspirin, statin, beta-blocker and now Ranexa  3. HYPERTENSION: Stable  4. HLD: Triglycerides elevated at 435, LDL 94, recently restarted rosuvastatin   5. hypothyroidism  6. Leukocytosis        PLAN:  1. Continue Ranexa 500 mg twice daily at discharge  2. Continue her aspirin, statin, beta-blocker  3. Consider other etiology of her chest pain since no obstructive CAD  4. Okay for discharge from cardiac perspective. Will review cardiac medications on discharge medication reconciliation form. Patient has follow up appointment with Dr. Anya Green in 2 weeks.       ALIYAH Arita - CNP, 2/24/2022, 9:18 AM  [facility]   [phone]       Telemetry: SR 70-80, isolated PVCs  NYHA: III    Physical Exam:  General:  Awake, alert, NAD  Skin:  Warm and dry  Neck:  JVP normal  Chest: Clear to auscultation  Cardiovascular:  RRR, normal S1S2, no MRG  Abdomen:  Soft, nontender, +bowel sounds  Extremities: No BLE edema  right femoral site without ooze, bruise or hematoma, dressing C,D,I, 2+ pulse      Medications:    sodium chloride flush  5-40 mL IntraVENous 2 times per day    ranolazine  500 mg Oral BID    aspirin  81 mg Oral Daily    atenolol  50 mg Oral Daily    levothyroxine  75 mcg Oral Daily    meloxicam  15 mg Oral Daily    losartan 50 mg Oral Daily    rosuvastatin  40 mg Oral Nightly    sodium chloride flush  5-40 mL IntraVENous 2 times per day    enoxaparin  30 mg SubCUTAneous BID      sodium chloride      sodium chloride         Lab Data: Lab results independently reviewed and analyzed by myself 2/24/2022    CBC:   Recent Labs     02/22/22  1151 02/23/22  0741 02/24/22  0714   WBC 16.3* 12.9* 11.0   HGB 12.6 13.0 11.9*    317 268     BMP:    Recent Labs     02/22/22  1151 02/23/22  0741 02/24/22  0714   * 136 136   K 4.6 4.2 4.3   CO2 22 27 29   BUN 21* 20 19   CREATININE 0.6 0.7 0.7     INR:  No results for input(s): INR in the last 72 hours. BNP:  No results for input(s): PROBNP in the last 72 hours. Cardiac Enzymes:   Recent Labs     02/22/22 2124 02/23/22  0024 02/23/22  1016   TROPONINI <0.01 <0.01 <0.01     Lipids:   Lab Results   Component Value Date    TRIG 435 02/23/2022    TRIG 246 01/27/2022    HDL 43 02/23/2022    HDL 45 01/27/2022    HDL 43 06/11/2010    LDLCALC see below 02/23/2022    LDLCALC 118 01/27/2022    LDLDIRECT 94 02/23/2022    LDLDIRECT 110 12/19/2019       Cardiac Imaging:   Catheterization 2/23/2022:    Procedures Performed:   1. Left heart catheterization  2. Selective left and right coronary angiogram  3. Left ventriculography   4. Mynx   Procedure Findings:  1. Mild non-obstructive coronary artery disease. ~patent LAD stent  2. Normal left ventricular function with EF estimated at 55-60%  3. Normal left heart hemodynamics  Findings:   1. Left main coronary artery was normal. It gave off the left anterior descending artery and left circumflex. 2. Left anterior descending artery has mild atherosclerotic disease. It was moderate in size. It gave off septal perforators and a moderate sized diagonal branch. The LAD covered the entire apex of the left ventricle. ~There is a stent within the proximal and mid LAD.   The stent is patent and has very minimal in-stent restenosis. 3. Left circumflex has mild atherosclerotic disease. It was moderate in size. There was a moderate sized obtuse marginal branch. 4. Right coronary artery has mild atherosclerotic disease. It was moderate in size and was the dominant artery. 5. Left ventriculogram showed normal LVEF at 55-60%. Wall motion was normal . There was no significant mitral valve or aortic valve disease noted. LVEDP was normal. There was no gradient noted across the aortic valve during pullback of the catheter. CONCLUSIONS:   1. Mild non-obstructive coronary artery disease with preserved LVEF   ASSESSMENT/RECOMMENDATIONS:   1. Medical management of the patient's coronary disease. 2.  We will add Ranexa 500 mg twice daily for additional antianginal therapy       Stress test 2/27/2021  Conclusions    Summary  There is normal isotope uptake at stress and rest.  No evidence of myocardium at risk for significant reversible ischemia. Normal LV function. Overall findings represent a low risk scan. Stress test 2019  IMPRESSION:   This is a normal study   Normal EF   The right ventricular size and function appears normal.     Good quality study   Attenuation artifact absent. No prior studies available for comparison. The risk of this study is low      Stress test 9/22/2018  Summary  Normal hemodynamic response to exercise. ECG portion of stress test is negative for ischemia by diagnostic criteria  (reached 93% of max predicted). Normal LV size and systolic function. Left ventricular ejection fraction of 73 %. There is a small, moderate intensity, fixed apical defect with hypokinesis,  consistent with infarct. There is a small, mild intensity, fixed inferolateral defect consistent with  diaphragmatic attenuation artifact. No evidence of myocardium at risk for significant reversible ischemia   Echocardiogram 6/6/2018  Summary   LV systolic function is normal with ejection fraction estimated at 55%.    No regional wall motion abnormalities. Normal left ventricular diastolic filling pressure. Mild mitral regurgitation. Trivial tricuspid regurgitation. Systolic pulmonary artery pressure (SPAP) is normal estimated at 30 mmHg   (Right atrial pressure of 3 mmHg). Echocardiogram 8/2017  Summary   Normal left ventricle systolic function with an estimated ejection fraction   of 55%. No regional wall motion abnormalities are seen. Normal left ventricular diastolic filling pressure. The left atrium is mildly dilated. Mild mitral and tricuspid regurgitation. Systolic pulmonary artery pressure (SPAP) is normal and estimated at 34 mmHg   (RA pressure 3 mmHg). Last echo on 11/23/2016 showed EF 50-55%. Stress echocardiogram 2017 [facility] 26   Study Conclusions     - Stress ECG conclusions: There were no stress arrhythmias or     conduction abnormalities. The stress ECG was negative for     ischemia. Maldonado scoring: exercise time of 9.5min; maximum ST     deviation of 0mm; no angina; resulting score is 10. This score     predicts a low risk of cardiac events. - Stress echo: Normal echo stress. Left heart cath 2016 [facility]  Mild CAD w/ patent LAD stent  LVEF 50-55%     Echocardiogram 11/23/2016  Summary   LV systolic function is lower normal with ejection fraction estimated at   50-55%. There is mild hypokinesis of the apex. Diastolic filling parameters suggests normal diastolic filling pressure . Mild mitral annular calcification. Trivial tricuspid regurgitation without jet adequate to estimate systolic   pulmonary artery pressure (SPAP).

## 2022-02-24 NOTE — DISCHARGE SUMMARY
Hospital Medicine Discharge Summary    Patient ID: Yasmeen Savage      Patient's PCP: Mekhi Limon DO    Admit Date: 2/22/2022     Discharge Date:   02/24/2022    Admitting Provider: No admitting provider for patient encounter. Discharge Provider: Jv Watson MD     Discharge Diagnoses: Active Hospital Problems    Diagnosis     Chest pain [R07.9]        The patient was seen and examined on day of discharge and this discharge summary is in conjunction with any daily progress note from day of discharge. Hospital Course: In brief this is a 48 yoF with history of CAD s/p NIKOLE 6 years ago who presented with atypical chest pain concerning for cardiac etiology. Hospital course complicated by acute worsening of chest discomfort with hypotension perhaps iatrogenic from medications (nitro and morphine) who underwent diagnostic cath and was found to have non obstructive CAD. Sxs resolved and pt was medically managed. Pt discharged with cardiac rehab. Please see problem based hospital course below:      Atypical chest pain- heart score 5 (high risk story with >3 risk factors and H/o PCI/NIKOLE LAD 11/2016). However her EKG without ST elevations or depressions, trop neg x 3. Though she is high risk her current diagnostic work up does not align well with ACS chest pain (keep in mind trops and EKG can lag behind clinical course). Echo without WMA and nrl EF. Alternative to non acs but cardiac etiologies include: Angina, valvular pathology (AS, MV though previous echos do not show this), aortic pathology (CAT PE without thoracic aorta abnormalities) myocarditis or pericarditis (leukocytosis but no infectious sxs), arrhthymias (no events on tele). Non cardiac etiologies to entertain include PE (CTA PE normal), PTX (but cxr with normal and exam with bilateral lung sounds), no fever or pulmonary sxs to suggest pneumonia.  Life threatening Gi causes such as esophageal rupture or Gi obstruction does not correlate with current clinical presentation. - sxs resolved and in the presence of non obstructive CAD sxs deemed angina and started on ranexa. Cardiac rehab prescribed at [facility].     Hypertension-continue with Benicar     Hyperlipidemia-Crestor     Hypothyroidism-Synthroid     Obesity-need counseling on weight loss, diet and exercise     Leukocytosis-possibly secondary to recent prednisone though seems unlikely with localized steroid injection. No focal or systemic sign of further inflammation. WBC self resolved.     Physical Exam Performed:     /78   Pulse 77   Temp 98.2 °F (36.8 °C) (Oral)   Resp 18   Ht 5' 6\" (1.676 m)   Wt 224 lb 14.4 oz (102 kg)   LMP 02/15/2022   SpO2 99%   BMI 36.30 kg/m²       General appearance:  No apparent distress, appears stated age and cooperative. HEENT:  Normal cephalic, atraumatic without obvious deformity. Pupils equal, round, and reactive to light. Extra ocular muscles intact. Conjunctivae/corneas clear. Neck: Supple, with full range of motion. No jugular venous distention. Trachea midline. Respiratory:  Normal respiratory effort. Clear to auscultation, bilaterally without Rales/Wheezes/Rhonchi. Cardiovascular:  Regular rate and rhythm with normal S1/S2 without murmurs, rubs or gallops. Abdomen: Soft, non-tender, non-distended with normal bowel sounds. Musculoskeletal:  No clubbing, cyanosis or edema bilaterally. Full range of motion without deformity. Skin: Skin color, texture, turgor normal.  No rashes or lesions. Neurologic:  Neurovascularly intact without any focal sensory/motor deficits. Cranial nerves: II-XII intact, grossly non-focal.  Psychiatric:  Alert and oriented, thought content appropriate, normal insight  Capillary Refill: Brisk,< 3 seconds   Peripheral Pulses: +2 palpable, equal bilaterally       Labs:  For convenience and continuity at follow-up the following most recent labs are provided:      CBC:    Lab Results   Component Value Date    WBC 11.0 30 tablet, Refills: 0      aspirin 81 MG tablet Take 81 mg by mouth daily      therapeutic multivitamin-minerals (THERAGRAN-M) tablet Take 1 tablet by mouth daily. ibuprofen (ADVIL;MOTRIN) 800 MG tablet Take 1 tablet by mouth every 8 hours as needed for Pain or Fever  Qty: 20 tablet, Refills: 0      ferrous sulfate 325 (65 Fe) MG tablet Take 325 mg by mouth daily (with breakfast)      Ascorbic Acid (VITAMIN C) 500 MG tablet Take 500 mg by mouth daily             Time Spent on discharge is more than 45 minutes in the examination, evaluation, counseling and review of medications and discharge plan. Signed:    Maisha Samuels MD   2/24/2022      Thank you Gayla Fofana DO for the opportunity to be involved in this patient's care. If you have any questions or concerns please feel free to contact me at [phone].

## 2022-02-24 NOTE — PROGRESS NOTES
Pt d/c'd home with daughter  Removed Left upper arm and Left wrist IV and stopped bleeding. Catheters intact. Pt tolerated well. No redness noted at site. Notified [facility] and removed tele box. Reviewed d/c instructions, home meds, and  f/u information utilizing teach-back method. Scripts sent to pharmacy per patient request. Patient verbalized understanding.

## 2022-02-28 ENCOUNTER — NURSE TRIAGE (OUTPATIENT)
Dept: OTHER | Facility: CLINIC | Age: 47
End: 2022-02-28

## 2022-02-28 ENCOUNTER — TELEPHONE (OUTPATIENT)
Dept: FAMILY MEDICINE CLINIC | Age: 47
End: 2022-02-28

## 2022-02-28 NOTE — TELEPHONE ENCOUNTER
Received call from Raeann Corona [age][address] 48[address] at [facility] with Red Flag Complaint. Subjective: Caller states \"They just told me at the hospital to follow up with my family doctor if I wasn't feeling better. I'm still really fatigued and having the sweats. The chest pains and SOB of are gone. They gave me some medicine for angina. I just feel horrible. I still don't have any energy to do anything. \" \"It's not that I feel worse, I'm just not getting any better. \"    Admitted to hospital last week for chest pain, SOB, sweats    Possible blood clots d/t symptoms- NO clots found. Angiogram done- and NO blockage. Calling for hospital follow up with PCP    Current Symptoms: Fatigue, Sweats-intermittent/a couple times per day    Hx of MI 6 years ago      Attention Provider: Thank you for allowing me to participate in the care of your patient. The patient was connected to triage in response to information provided to the ECC/PSC. Please do not respond through this encounter as the response is not directed to a shared pool.         Reason for Disposition   Requesting regular office appointment    Protocols used: INFORMATION ONLY CALL - NO TRIAGE-ADULT-

## 2022-02-28 NOTE — TELEPHONE ENCOUNTER
----- Message from Abdirashid Been sent at 2/28/2022  9:59 AM EST -----  Subject: Appointment Request    Reason for Call: Routine Hospital Follow Up    QUESTIONS  Type of Appointment? Established Patient  Reason for appointment request? Available appointments did not meet   patient need  Additional Information for Provider? Pt would like an appt within the next   two days, because is still not feeling well since discharged on Thursday 2/24/2022  ---------------------------------------------------------------------------  --------------  CALL BACK INFO  What is the best way for the office to contact you? OK to leave message on   voicemail  Preferred Call Back Phone Number? [phone]  ---------------------------------------------------------------------------  --------------  SCRIPT ANSWERS  Relationship to Patient? Self  (Patient requests to see provider urgently. )? No  (Has the patient been discharged from the hospital within 2 business days   AND does not have a Telephone Encounter  Follow Up From [address] [place] [address]   documented in [address]?)? No  Do you have any questions for your primary care provider that need to be   answered prior to your appointment? (Use RN Triage if question pertains to   anything on the red flag list)? No  (Patient needs follow up visit after hospital discharge) Book first   available appointment within 7 days OF DISCHARGE, if no appt, proceed to   book the next available time slot within 14 days OF DISCHARGE AND Send   Message to Provider. [address] [facility] Follow Up appointment cannot be booked   beyond 14 Days and should result in a Message to Provider. ? Yes   Have you been diagnosed with, awaiting test results for, or told that you   are suspected of having COVID-19 (Coronavirus)? (If patient has tested   negative or was tested as a requirement for work, school, or travel and   not based on symptoms, answer no)?  No  Within the past 10 days have you developed any of the following symptoms   (answer no if symptoms have been present longer than 10 days or began   more than 10 days ago)? Fever or Chills, Cough, Shortness of breath or   difficulty breathing, Loss of taste or smell, Sore throat, Nasal   congestion, Sneezing or runny nose, Fatigue or generalized body aches   (answer no if pain is specific to a body part e.g. back pain), Diarrhea,   Headache? No  Have you had close contact with someone with COVID-19 in the last 7 days? No  (Service Expert  click yes below to proceed with [employer]   Scheduling)?  Yes

## 2022-02-28 NOTE — TELEPHONE ENCOUNTER
Patient scheduled with Merian Bloch CNP   Future Appointments   Date Time Provider Hari Chowdhury   3/1/2022 11:20 AM ALIYAH Antonio CNP   3/11/2022  7:45 AM Nova Hedrick MD [facility]   Patient is having fatigue and sweats. Patient advised if worse to go to emergency department.

## 2022-03-03 ENCOUNTER — OFFICE VISIT (OUTPATIENT)
Dept: FAMILY MEDICINE CLINIC | Age: 47
End: 2022-03-03
Payer: COMMERCIAL

## 2022-03-03 VITALS
DIASTOLIC BLOOD PRESSURE: 78 MMHG | TEMPERATURE: 97.1 F | SYSTOLIC BLOOD PRESSURE: 122 MMHG | WEIGHT: 225 LBS | BODY MASS INDEX: 36.32 KG/M2 | RESPIRATION RATE: 16 BRPM

## 2022-03-03 DIAGNOSIS — R73.01 ELEVATED FASTING GLUCOSE: ICD-10-CM

## 2022-03-03 DIAGNOSIS — E03.9 HYPOTHYROIDISM, UNSPECIFIED TYPE: ICD-10-CM

## 2022-03-03 DIAGNOSIS — I10 PRIMARY HYPERTENSION: Chronic | ICD-10-CM

## 2022-03-03 DIAGNOSIS — R53.83 FATIGUE, UNSPECIFIED TYPE: ICD-10-CM

## 2022-03-03 DIAGNOSIS — R07.89 ATYPICAL CHEST PAIN: Primary | ICD-10-CM

## 2022-03-03 DIAGNOSIS — L74.519 EXCESSIVE SWEATING, LOCAL: ICD-10-CM

## 2022-03-03 DIAGNOSIS — R07.89 ATYPICAL CHEST PAIN: ICD-10-CM

## 2022-03-03 LAB
HCT VFR BLD CALC: 35.6 % (ref 36–48)
HEMOGLOBIN: 11.6 G/DL (ref 12–16)
IRON SATURATION: 22 % (ref 15–50)
IRON: 74 UG/DL (ref 37–145)
MCH RBC QN AUTO: 26.6 PG (ref 26–34)
MCHC RBC AUTO-ENTMCNC: 32.5 G/DL (ref 31–36)
MCV RBC AUTO: 81.8 FL (ref 80–100)
PDW BLD-RTO: 15.1 % (ref 12.4–15.4)
PLATELET # BLD: 300 K/UL (ref 135–450)
PMV BLD AUTO: 8.4 FL (ref 5–10.5)
RBC # BLD: 4.36 M/UL (ref 4–5.2)
T3 FREE: 2.7 PG/ML (ref 2.3–4.2)
T4 FREE: 1.5 NG/DL (ref 0.9–1.8)
TOTAL IRON BINDING CAPACITY: 337 UG/DL (ref 260–445)
TSH REFLEX: 3.24 UIU/ML (ref 0.27–4.2)
WBC # BLD: 10 K/UL (ref 4–11)

## 2022-03-03 PROCEDURE — 1111F DSCHRG MED/CURRENT MED MERGE: CPT | Performed by: NURSE PRACTITIONER

## 2022-03-03 PROCEDURE — 99214 OFFICE O/P EST MOD 30 MIN: CPT | Performed by: NURSE PRACTITIONER

## 2022-03-03 ASSESSMENT — PATIENT HEALTH QUESTIONNAIRE - PHQ9
SUM OF ALL RESPONSES TO PHQ QUESTIONS 1-9: 0
1. LITTLE INTEREST OR PLEASURE IN DOING THINGS: 0
DEPRESSION UNABLE TO ASSESS: FUNCTIONAL CAPACITY MOTIVATION LIMITS ACCURACY
2. FEELING DOWN, DEPRESSED OR HOPELESS: 0
SUM OF ALL RESPONSES TO PHQ QUESTIONS 1-9: 0
SUM OF ALL RESPONSES TO PHQ9 QUESTIONS 1 & 2: 0

## 2022-03-03 ASSESSMENT — ENCOUNTER SYMPTOMS
WHEEZING: 0
STRIDOR: 0
CHOKING: 0
ABDOMINAL PAIN: 0
CHEST TIGHTNESS: 0
BACK PAIN: 0
COUGH: 0
SHORTNESS OF BREATH: 0

## 2022-03-03 NOTE — PROGRESS NOTES
Post-Discharge Transitional Care Follow Up      Kenzie Mansfield   YOB: 1975    Date of Office Visit:  3/3/2022  Date of Hospital Admission: 2/22/22  Date of Hospital Discharge: 2/24/22  Readmission Risk Score (high >=14%. Medium >=10%):No data recorded    Care management risk score Rising risk (score 2-5) and Complex Care (Scores >=6): 4     Non face to face  following discharge, date last encounter closed (first attempt may have been earlier): *No documented post hospital discharge outreach found in the last 14 days     Call initiated 2 business days of discharge: *No response recorded in the last 14 days     Atypical chest pain  -     MI DISCHARGE MEDS RECONCILED W/ CURRENT OUTPATIENT MED LIST  -     Iron and TIBC; Future  -     CBC; Future  Fatigue, unspecified type  -     Iron and TIBC; Future  -     CBC; Future  Excessive sweating, local  Hypothyroidism, unspecified type  -     TSH with Reflex; Future  -     T4, Free; Future  -     T3, Free; Future  Elevated fasting glucose  -     Hemoglobin A1C; Future  Primary hypertension      Medical Decision Making: low complexity  No follow-ups on file. On this date 3/3/2022 I have spent 35 minutes reviewing previous notes, test results and face to face with the patient discussing the diagnosis and importance of compliance with the treatment plan as well as documenting on the day of the visit. Will get labs today to check fatigue and sweating  No chest pain or dyspnea  Her WBC count was elevated initially at hospital visit- viral etiology? ABHILASH not on CPAP- contributing to fatigue? Discussed with pt last sleep study was 4 years ago. She will get back on her CPAP  Subjective:   HPI    Inpatient course: Discharge summary reviewed- see chart. Interval history/Current status:     No further chest pain but some episodes of chest tightness. Her biggest concern is fatigue and body sweating.   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 She does have a history of obstructive sleep apnea but does not use a CPAP, tells me she was only given this to use when she needed DOT certification but her 6 ABHILASH is very mild. She complains of excessive sweating mostly in the trunk. This is bothersome for her. No shortness of breath. Has appoint with cardiology on 3/1/2022. Her blood pressure is well controlled, has been checking it at home at different times and has been almost consistently controlled. Patient Active Problem List   Diagnosis    Hypertension    CAD s/p NIKOLE to mid-LAD (Nov 2016)    Chronic mediastinal lymphadenopathy (noted Nov 2016)    Hyperlipidemia    Obesity (BMI 30-39. 5)    Former smoker    Chronic fatigue    Acute non-recurrent frontal sinusitis    GERD (gastroesophageal reflux disease)    ABHILASH on CPAP    Contusion of right knee    Strain of left hamstring    Loose body in knee, left knee    Acute medial meniscus tear of left knee    S/P arthroscopic partial medial meniscectomy    Chronic pain of left knee    Effusion of left knee    Chest pain       Medications listed as ordered at the time of discharge from hospital  [unfilled]     Medications marked \"taking\" at this time  Outpatient Medications Marked as Taking for the 3/3/22 encounter (Office Visit) with Matilde Davis, ALIYAH - CNP   Medication Sig Dispense Refill    nitroGLYCERIN (NITROSTAT) 0.4 MG SL tablet up to max of 3 total doses.  If no relief after 1 dose, call 911. 25 tablet 3    ranolazine (RANEXA) 500 MG extended release tablet Take 1 tablet by mouth 2 times daily 60 tablet 3    olmesartan (BENICAR) 40 MG tablet Take 1 tablet by mouth daily 30 tablet 11    rosuvastatin (CRESTOR) 40 MG tablet TAKE 1 TABLET BY MOUTH IN THE EVENING 90 tablet 3    sulindac (CLINORIL) 200 MG tablet TAKE 1 TABLET BY MOUTH TWO TIMES A DAY 60 tablet 0    levothyroxine (SYNTHROID) 75 MCG tablet TAKE 1 TABLET BY MOUTH ONE TIME A DAY 30 tablet 9    atenolol (TENORMIN) 50 MG tablet Take 1 tablet by mouth daily 30 tablet 1    ibuprofen (ADVIL;MOTRIN) 800 MG tablet Take 1 tablet by mouth every 8 hours as needed for Pain or Fever 20 tablet 0    ferrous sulfate 325 (65 Fe) MG tablet Take 325 mg by mouth daily (with breakfast)      vitamin D3 (CHOLECALCIFEROL) 400 units TABS tablet Take 2 tablets by mouth daily (Patient taking differently: Take 800 Units by mouth daily 1 tab daily 2/11/22) 30 tablet 0    Ascorbic Acid (VITAMIN C) 500 MG tablet Take 500 mg by mouth daily      aspirin 81 MG tablet Take 81 mg by mouth daily      therapeutic multivitamin-minerals (THERAGRAN-M) tablet Take 1 tablet by mouth daily. Medications patient taking as of now reconciled against medications ordered at time of hospital discharge: Yes    Review of Systems   Constitutional: Positive for fatigue. Negative for activity change, appetite change, chills, diaphoresis (back and abdomen), fever and unexpected weight change. Respiratory: Negative for cough, choking, chest tightness, shortness of breath, wheezing and stridor. Cardiovascular: Positive for chest pain (chest tightness). Negative for palpitations and leg swelling. Gastrointestinal: Negative for abdominal pain. Endocrine:        Hypothyroid on Synthroid. TSH was normal in Jan 2022   Genitourinary: Negative for difficulty urinating. Regular menses   Musculoskeletal: Negative for arthralgias and back pain. Skin: Negative for rash. Neurological: Negative for dizziness. Objective:    /78 (Site: Left Upper Arm, Position: Sitting)   Temp 97.1 °F (36.2 °C) (Temporal)   Resp 16   Wt 225 lb (102.1 kg)   LMP 02/15/2022 (Approximate)   Breastfeeding No   BMI 36.32 kg/m²   Physical Exam  Vitals and nursing note reviewed. Constitutional:       General: She is not in acute distress. Appearance: Normal appearance. She is well-developed. She is obese. She is not ill-appearing, toxic-appearing or diaphoretic. HENT:      Head: Normocephalic and atraumatic. Eyes:      Extraocular Movements: Extraocular movements intact. Pupils: Pupils are equal, round, and reactive to light. Cardiovascular:      Rate and Rhythm: Normal rate and regular rhythm. Heart sounds: Normal heart sounds, S1 normal and S2 normal. No murmur heard. No friction rub. No gallop. Pulmonary:      Effort: Pulmonary effort is normal. No respiratory distress. Breath sounds: Normal breath sounds. Neurological:      General: No focal deficit present. Mental Status: She is alert and oriented to person, place, and time. Mental status is at baseline. Cranial Nerves: No cranial nerve deficit. Psychiatric:         Speech: Speech normal.       An electronic signature was used to authenticate this note.   --ALIYAH Newby - CNP

## 2022-03-04 LAB
ESTIMATED AVERAGE GLUCOSE: 125.5 MG/DL
HBA1C MFR BLD: 6 %

## 2022-03-10 DIAGNOSIS — I10 ESSENTIAL HYPERTENSION: Chronic | ICD-10-CM

## 2022-03-10 RX ORDER — SULINDAC 200 MG/1
TABLET ORAL
Qty: 60 TABLET | Refills: 0 | Status: SHIPPED | OUTPATIENT
Start: 2022-03-10 | End: 2022-04-11

## 2022-03-10 RX ORDER — ATENOLOL 50 MG/1
TABLET ORAL
Qty: 30 TABLET | Refills: 0 | Status: SHIPPED | OUTPATIENT
Start: 2022-03-10 | End: 2022-04-11

## 2022-03-10 NOTE — PROGRESS NOTES
[facility]   Cardiac Consultation    Referring Provider:  Tania Hassan Blvd, DO     Chief Complaint   Patient presents with    Follow-up    Coronary Artery Disease    Hyperlipidemia    Hypertension    Chest Pain     more of a discomfort    Shortness of Breath      Sussy Amber Betancourtdayo   1975    History of Present Illness:   Barbara Carmichael is a 48 y.o. female who is here today for hospital follow up for chest pain. She has a past medical history of history of coronary artery disease, hypertension, and hyperlipidemia. Patient had a left heart cath in 2016 which showed patient LAD stent. Normal stress echocardiogram in 2017. Stress test 2018 showed small, moderate intensity, fixed apical defect with hypokinesis, consistent with infarct. There is a small, mild intensity, fixed inferolateral defect consistent with  diaphragmatic attenuation artifact. Repeat stress test 2/27/2021 showed no ischemia or scar. Most recent echocardiogram from 6/6/2018 showed an EF of 55%. At her last visit we stopped losartan and started Benicar (Olmesartan) 40 mg daily    She presented to the hospital on 2/22/2022 due to chest pain. An echocardiogram on 2/23/2022 had to be stopped due to patient having chest pain. Rapid response was called. Test resumed, patient had frequent PVC's after testing was restarted. Left heart cath showed patent LAD stent. Today she states she feels slightly better since she left the hospital. She missed a day of her Olmesartan and noted that her chest pain decreased. She states she has constant chest pain that now feels like an ache. Pain wakes her from sleep and causes her to have issues sleeping. She states she also has palpitations at night that feels like a fluttering. Pain is worse when she sits from a standing position. She also has SOB. She has been walking for exercise and doesn't really notice the chest pain with this activity.  Blood pressure at home prior to coming to the hospital was elevated. Patient currently denies any weight gain, edema, dizziness, and syncope. Overall she thinks her pain has decreased with Ranexa. Past Medical History:   has a past medical history of CAD (coronary artery disease), GERD (gastroesophageal reflux disease), Heart attack ([address] Hyperlipidemia, Hypertension, and Obesity (BMI 30-39.9). Surgical History:   has a past surgical history that includes Spine surgery; Appendectomy; Tonsillectomy; Knee arthroscopy; Leg Surgery; Neck surgery; Coronary angioplasty with stent; Knee arthroscopy (Left, 03/06/2019); and Knee arthroscopy (Left, 3/6/2019). Social History:   reports that she quit smoking about 5 years ago. Her smoking use included cigarettes. She has a 5.00 pack-year smoking history. She has never used smokeless tobacco. She reports current alcohol use. She reports that she does not use drugs. Family History:  family history includes Heart Disease in her mother; Heart Disease (age of onset: 54) in her father; High Blood Pressure in her father and mother. Home Medications:  Prior to Admission medications    Medication Sig Start Date End Date Taking? Authorizing Provider   atenolol (TENORMIN) 50 MG tablet TAKE 1 TABLET BY MOUTH EVERY DAY 3/10/22  Yes Bruce Post DO   sulindac (CLINORIL) 200 MG tablet TAKE 1 TABLET BY MOUTH TWO TIMES A DAY 3/10/22  Yes Jenny Childress MD   nitroGLYCERIN (NITROSTAT) 0.4 MG SL tablet up to max of 3 total doses.  If no relief after 1 dose, call 911. 2/24/22  Yes ALIYAH Arita - CNP   ranolazine (RANEXA) 500 MG extended release tablet Take 1 tablet by mouth 2 times daily 2/24/22  Yes ALIYAH Arita - CNP   rosuvastatin (CRESTOR) 40 MG tablet TAKE 1 TABLET BY MOUTH IN THE EVENING 2/11/22  Yes Sheron Chin MD   levothyroxine (SYNTHROID) 75 MCG tablet TAKE 1 TABLET BY MOUTH ONE TIME A DAY 1/10/22  Yes Bruce Post DO   ibuprofen (ADVIL;MOTRIN) 800 MG tablet Take 1 tablet by mouth every 8 hours as needed for Pain or Fever 10/17/18  Yes Alissa Abbott APRN - CNP   ferrous sulfate 325 (65 Fe) MG tablet Take 325 mg by mouth daily (with breakfast)   Yes Historical Provider, MD   vitamin D3 (CHOLECALCIFEROL) 400 units TABS tablet Take 2 tablets by mouth daily  Patient taking differently: Take 800 Units by mouth daily 1 tab daily 2/11/22 8/14/17  Yes Massiel MairaALIYAH Figueroa - CNP   Ascorbic Acid (VITAMIN C) 500 MG tablet Take 500 mg by mouth daily   Yes Historical Provider, MD   aspirin 81 MG tablet Take 81 mg by mouth daily   Yes Historical Provider, MD   therapeutic multivitamin-minerals (THERAGRAN-M) tablet Take 1 tablet by mouth daily. Yes Historical Provider, MD   olmesartan (BENICAR) 40 MG tablet Take 1 tablet by mouth daily  Patient not taking: Reported on 3/11/2022 2/11/22   Ly Boyd MD        Allergies:  Patient has no known allergies. Review of Systems:   · Constitutional: there has been no unanticipated weight loss. There's been no change in energy level, sleep pattern, or activity level. · Eyes: No visual changes or diplopia. No scleral icterus. · ENT: No Headaches, hearing loss or vertigo. No mouth sores or sore throat. · Cardiovascular: Reviewed in HPI  · Respiratory: No cough or wheezing, no sputum production. No hematemesis. · Gastrointestinal: No abdominal pain, appetite loss, blood in stools. No change in bowel or bladder habits. · Genitourinary: No dysuria, trouble voiding, or hematuria. · Musculoskeletal:  No gait disturbance, weakness or joint complaints. · Integumentary: No rash or pruritis. · Neurological: No headache, diplopia, change in muscle strength, numbness or tingling. No change in gait, balance, coordination, mood, affect, memory, mentation, behavior. · Psychiatric: No anxiety, no depression. · Endocrine: No malaise, fatigue or temperature intolerance. No excessive thirst, fluid intake, or urination. No tremor.   · Hematologic/Lymphatic: No abnormal bruising or bleeding, blood clots or swollen lymph nodes. · Allergic/Immunologic: No nasal congestion or hives. Physical Examination:    Vitals:    03/11/22 0755   BP: 130/86   Pulse: 66   SpO2: 98%        Constitutional and General Appearance: NAD   Respiratory:  · Normal excursion and expansion without use of accessory muscles  · Resp Auscultation: Normal breath sounds without dullness  Cardiovascular:  · The apical impulses not displaced  · Heart tones are crisp and normal  · Cervical veins are not engorged  · The carotid upstroke is normal in amplitude and contour without delay or bruit  · Normal S1S2, No S3, No Murmur  · Peripheral pulses are symmetrical and full  · There is no clubbing, cyanosis of the extremities. · No edema  · Femoral Arteries: 2+ and equal  · Pedal Pulses: 2+ and equal   Abdomen:  · No masses or tenderness  · Liver/Spleen: No Abnormalities Noted  Neurological/Psychiatric:  · Alert and oriented in all spheres  · Moves all extremities well  · Exhibits normal gait balance and coordination  · No abnormalities of mood, affect, memory, mentation, or behavior are noted    Left heart cath 2016 [facility] [place]  Mild CAD w/ patent LAD stent  LVEF 50-55%    Echocardiogram 11/23/2016  Summary   LV systolic function is lower normal with ejection fraction estimated at   50-55%. There is mild hypokinesis of the apex. Diastolic filling parameters suggests normal diastolic filling pressure . Mild mitral annular calcification. Trivial tricuspid regurgitation without jet adequate to estimate systolic   pulmonary artery pressure (SPAP). Echocardiogram 8/2017  Summary   Normal left ventricle systolic function with an estimated ejection fraction   of 55%. No regional wall motion abnormalities are seen. Normal left ventricular diastolic filling pressure. The left atrium is mildly dilated. Mild mitral and tricuspid regurgitation.    Systolic pulmonary artery pressure (SPAP) is normal and estimated at 34 mmHg   (RA pressure 3 mmHg). Last echo on 11/23/2016 showed EF 50-55%. Stress echocardiogram 2017 [facility]   Study Conclusions     - Stress ECG conclusions: There were no stress arrhythmias or     conduction abnormalities. The stress ECG was negative for     ischemia. Maldonado scoring: exercise time of 9.5min; maximum ST     deviation of 0mm; no angina; resulting score is 10. This score     predicts a low risk of cardiac events. - Stress echo: Normal echo stress. Echocardiogram 6/6/2018  Summary   LV systolic function is normal with ejection fraction estimated at 55%. No regional wall motion abnormalities. Normal left ventricular diastolic filling pressure. Mild mitral regurgitation. Trivial tricuspid regurgitation. Systolic pulmonary artery pressure (SPAP) is normal estimated at 30 mmHg   (Right atrial pressure of 3 mmHg). Stress test 9/22/2018  Summary  Normal hemodynamic response to exercise. ECG portion of stress test is negative for ischemia by diagnostic criteria  (reached 93% of max predicted). Normal LV size and systolic function. Left ventricular ejection fraction of 73 %. There is a small, moderate intensity, fixed apical defect with hypokinesis,  consistent with infarct. There is a small, mild intensity, fixed inferolateral defect consistent with  diaphragmatic attenuation artifact. No evidence of myocardium at risk for significant reversible ischemia     Stress test 2019  IMPRESSION:   This is a normal study   Normal EF   The right ventricular size and function appears normal.     Good quality study   Attenuation artifact absent. No prior studies available for comparison. The risk of this study is low     Stress test 2/27/2021  Conclusions    Summary  There is normal isotope uptake at stress and rest.  No evidence of myocardium at risk for significant reversible ischemia. Normal LV function. Overall findings represent a low risk scan.      EKG 2/27/2021  NSR, rate 63    Echocardiogram 2/23/2022  Summary   Patient had severe chest pain at 10: 00, stopped echo and activated rapid   response. Restated started test at 10:36. Frequent PVCs seen during 2nd portion of echo. -- Normal left ventricle size, wall thickness (upper limit of normal) and   systolic function with an estimated ejection fraction of 60-65%. No regional   wall motion abnormalities are seen. Normal left ventricular diastolic   filling pressure. -- Normal wall motion during chest pain. -- Trace mitral and tricuspid regurgitation. Left heart cath 2/23/2022  Procedure Findings:  1. Mild non-obstructive coronary artery disease. ~patent LAD stent  2. Normal left ventricular function with EF estimated at 55-60%  3. Normal left heart hemodynamics        Assessment:   Chest pain - atypical. Suspect sig non cardiac component  Palpitations - persistent   Coronary artery disease - stable  VILLARREAL - mild, chronic, stable  Mild mitral regurgitation. Hypertension - suboptimal   Hyperlipidemia - suboptimal. likely due to not consistently taking statin recently     Plan:  Remain off Olmesrtan   Restart Losartan 50 mg daily   Start Verapamil 120 mg daily to help with palpitations    Continue Ranexa if this is helping you chest pain   Cardiac medications reviewed including indications and pertinent side effects    Check blood pressure and heart rate at home a few times per week- keep a log with dates and times and bring to office visit- after 3-4 weeks on new medication blood pressure should not be over 140/90 more than once per week   Regular exercise and following a healthy diet encouraged   Follow up with me in 4-6 weeks   Follow up with PCP to investigate other causes of chest discomfort     Scribe's attestation:   This note was scribed in the presence of Dr. Sirisha Pascual M.D. By Elba Taveras RN    The scribes documentation has been prepared under my direction and personally reviewed by me in its entirety. I confirm that the note above accurately reflects all work, treatment, procedures, and medical decision making performed by me. Dr. Barry Elizalde MD      Thank you for allowing me to participate in the care of this individual.      Nan Simon M.D., [place]

## 2022-03-10 NOTE — TELEPHONE ENCOUNTER
Last ov 03/03/2022   Future Appointments   Date Time Provider Hari Chowdhury   3/11/2022  7:45 AM Lucila Cordova MD [facility]

## 2022-03-11 ENCOUNTER — OFFICE VISIT (OUTPATIENT)
Dept: CARDIOLOGY CLINIC | Age: 47
End: 2022-03-11
Payer: COMMERCIAL

## 2022-03-11 VITALS
SYSTOLIC BLOOD PRESSURE: 130 MMHG | BODY MASS INDEX: 37.92 KG/M2 | HEART RATE: 66 BPM | WEIGHT: 227.6 LBS | DIASTOLIC BLOOD PRESSURE: 86 MMHG | HEIGHT: 65 IN | OXYGEN SATURATION: 98 %

## 2022-03-11 DIAGNOSIS — I25.118 CORONARY ARTERY DISEASE INVOLVING NATIVE CORONARY ARTERY OF NATIVE HEART WITH OTHER FORM OF ANGINA PECTORIS (HCC): Primary | Chronic | ICD-10-CM

## 2022-03-11 DIAGNOSIS — I10 PRIMARY HYPERTENSION: Chronic | ICD-10-CM

## 2022-03-11 DIAGNOSIS — E78.5 HYPERLIPIDEMIA, UNSPECIFIED HYPERLIPIDEMIA TYPE: Chronic | ICD-10-CM

## 2022-03-11 PROCEDURE — 99214 OFFICE O/P EST MOD 30 MIN: CPT | Performed by: INTERNAL MEDICINE

## 2022-03-11 RX ORDER — LOSARTAN POTASSIUM 100 MG/1
100 TABLET ORAL DAILY
Qty: 90 TABLET | Refills: 3 | Status: SHIPPED | OUTPATIENT
Start: 2022-03-11

## 2022-03-11 NOTE — PATIENT INSTRUCTIONS
Plan:  Remain off Olmesrtan   Restart Losartan 50 mg daily   Start Verapamil 120 mg daily to help with palpitations    Continue Ranexa if this is helping you chest pain   Cardiac medications reviewed including indications and pertinent side effects    Check blood pressure and heart rate at home a few times per week- keep a log with dates and times and bring to office visit- after 3-4 weeks on new medication blood pressure should not be over 140/90 more than once per week   Regular exercise and following a healthy diet encouraged   Follow up with me in 4-6 weeks   Follow up with PCP to investigate other causes of chest discomfort

## 2022-04-09 DIAGNOSIS — I10 ESSENTIAL HYPERTENSION: Chronic | ICD-10-CM

## 2022-04-11 RX ORDER — SULINDAC 200 MG/1
TABLET ORAL
Qty: 60 TABLET | Refills: 0 | Status: SHIPPED | OUTPATIENT
Start: 2022-04-11

## 2022-04-11 RX ORDER — ATENOLOL 50 MG/1
TABLET ORAL
Qty: 30 TABLET | Refills: 3 | Status: SHIPPED | OUTPATIENT
Start: 2022-04-11 | End: 2022-08-31

## 2022-04-11 NOTE — TELEPHONE ENCOUNTER
Last ov 03/03/2022   Future Appointments   Date Time Provider Hari Chowdhury   4/29/2022  9:15 AM Hailee Brannon MD [facility]

## 2022-04-28 NOTE — PROGRESS NOTES
[facility]   Cardiac Consultation    Referring Provider:  Obed Schreiber DO     Chief Complaint   Patient presents with    Cardiac Clearance     Knee replacement     Hypertension    Hyperlipidemia    Coronary Artery Disease    Medication Refill     Renexa       Sussy Scott   1975    History of Present Illness:   Jessie Nayak is a 48 y.o. female who is here today for preoperative cardiac risk assessment for knee replacement. She has a past medical history of history of coronary artery disease, hypertension, and hyperlipidemia. Patient had a left heart cath in 2016 which showed patient LAD stent. Normal stress echocardiogram in 2017. Stress test 2018 showed small, moderate intensity, fixed apical defect with hypokinesis, consistent with infarct. There is a small, mild intensity, fixed inferolateral defect consistent with  diaphragmatic attenuation artifact. Repeat stress test 2/27/2021 showed no ischemia or scar. Most recent echocardiogram from 6/6/2018 showed an EF of 55%. At her last visit we stopped losartan and started Benicar (Olmesartan) 40 mg daily. She presented to the hospital on 2/22/2022 due to chest pain. An echocardiogram on 2/23/2022 had to be stopped due to patient having chest pain. Rapid response was called. Test resumed, patient had frequent PVC's after testing was restarted. Left heart cath showed patent LAD stent. Today she states she feels slightly better since she left the hospital. She missed a day of her Olmesartan and noted that her chest pain decreased. Last visit Olmesartan  stopped, Losartan restarted, Valsartan started. Today she states she has been feeling well since she left the hospital. No further episodes of chest pain. Blood pressure at home runs as high as 140/80's. She is tolerating her medications and is taking them as prescribed. Denies chest pain, shortness of breath, edema, dizziness, palpitations and syncope.     Past Medical History:   has a past medical history of CAD (coronary artery disease), GERD (gastroesophageal reflux disease), Heart attack ([address] Hyperlipidemia, Hypertension, and Obesity (BMI 30-39.9). Surgical History:   has a past surgical history that includes Spine surgery; Appendectomy; Tonsillectomy; Knee arthroscopy; Leg Surgery; Neck surgery; Coronary angioplasty with stent; Knee arthroscopy (Left, 03/06/2019); and Knee arthroscopy (Left, 3/6/2019). Social History:   reports that she quit smoking about 5 years ago. Her smoking use included cigarettes. She has a 5.00 pack-year smoking history. She has never used smokeless tobacco. She reports current alcohol use. She reports that she does not use drugs. Family History:  family history includes Heart Disease in her mother; Heart Disease (age of onset: 54) in her father; High Blood Pressure in her father and mother. Home Medications:  Prior to Admission medications    Medication Sig Start Date End Date Taking?  Authorizing Provider   sulindac (CLINORIL) 200 MG tablet TAKE 1 TABLET BY MOUTH TWO TIMES A DAY 4/11/22  Yes Gayla Smith MD   atenolol (TENORMIN) 50 MG tablet TAKE 1 TABLET BY MOUTH EVERY DAY 4/11/22  Yes [facility], DO   verapamil (CALAN SR) 120 MG extended release tablet Take 1 tablet by mouth daily 3/11/22  Yes Mary Agudelo MD   losartan (COZAAR) 100 MG tablet Take 1 tablet by mouth daily 3/11/22  Yes Mary Agudelo MD   rosuvastatin (CRESTOR) 40 MG tablet TAKE 1 TABLET BY MOUTH IN THE EVENING 2/11/22  Yes Mary Agudelo MD   levothyroxine (SYNTHROID) 75 MCG tablet TAKE 1 TABLET BY MOUTH ONE TIME A DAY 1/10/22  Yes Caitlin [facility], DO   ibuprofen (ADVIL;MOTRIN) 800 MG tablet Take 1 tablet by mouth every 8 hours as needed for Pain or Fever 10/17/18  Yes ALIYAH Garza - CNP   ferrous sulfate 325 (65 Fe) MG tablet Take 325 mg by mouth daily (with breakfast)   Yes Historical Provider, MD   vitamin D3 (CHOLECALCIFEROL) 400 units TABS tablet Take 2 tablets by mouth daily  Patient taking differently: Take 800 Units by mouth daily 1 tab daily 2/11/22 8/14/17  Yes ALIYAH Richards CNP   Ascorbic Acid (VITAMIN C) 500 MG tablet Take 500 mg by mouth daily   Yes Historical Provider, MD   aspirin 81 MG tablet Take 81 mg by mouth daily   Yes Historical Provider, MD   therapeutic multivitamin-minerals (THERAGRAN-M) tablet Take 1 tablet by mouth daily. Yes Historical Provider, MD   nitroGLYCERIN (NITROSTAT) 0.4 MG SL tablet up to max of 3 total doses. If no relief after 1 dose, call 911. 2/24/22   Nicki Merlin, APRN - CNP   ranolazine (RANEXA) 500 MG extended release tablet Take 1 tablet by mouth 2 times daily  Patient not taking: Reported on 4/29/2022 2/24/22   Nicki Merlin, APRN - CNP        Allergies:  Olmesartan     Review of Systems:   · Constitutional: there has been no unanticipated weight loss. There's been no change in energy level, sleep pattern, or activity level. · Eyes: No visual changes or diplopia. No scleral icterus. · ENT: No Headaches, hearing loss or vertigo. No mouth sores or sore throat. · Cardiovascular: Reviewed in HPI  · Respiratory: No cough or wheezing, no sputum production. No hematemesis. · Gastrointestinal: No abdominal pain, appetite loss, blood in stools. No change in bowel or bladder habits. · Genitourinary: No dysuria, trouble voiding, or hematuria. · Musculoskeletal:  No gait disturbance, weakness or joint complaints. · Integumentary: No rash or pruritis. · Neurological: No headache, diplopia, change in muscle strength, numbness or tingling. No change in gait, balance, coordination, mood, affect, memory, mentation, behavior. · Psychiatric: No anxiety, no depression. · Endocrine: No malaise, fatigue or temperature intolerance. No excessive thirst, fluid intake, or urination. No tremor.   · Hematologic/Lymphatic: No abnormal bruising or bleeding, blood clots or swollen lymph nodes.  · Allergic/Immunologic: No nasal congestion or hives. Physical Examination:    Vitals:    04/29/22 0924   BP: 126/74   Pulse: 70   SpO2: 98%        Constitutional and General Appearance: NAD   Respiratory:  · Normal excursion and expansion without use of accessory muscles  · Resp Auscultation: Normal breath sounds without dullness  Cardiovascular:  · The apical impulses not displaced  · Heart tones are crisp and normal  · Cervical veins are not engorged  · The carotid upstroke is normal in amplitude and contour without delay or bruit  · Normal S1S2, No S3, No Murmur  · Peripheral pulses are symmetrical and full  · There is no clubbing, cyanosis of the extremities. · No edema  · Femoral Arteries: 2+ and equal  · Pedal Pulses: 2+ and equal   Abdomen:  · No masses or tenderness  · Liver/Spleen: No Abnormalities Noted  Neurological/Psychiatric:  · Alert and oriented in all spheres  · Moves all extremities well  · Exhibits normal gait balance and coordination  · No abnormalities of mood, affect, memory, mentation, or behavior are noted    Left heart cath 2016 [facility]  Mild CAD w/ patent LAD stent  LVEF 50-55%    Echocardiogram 11/23/2016  Summary   LV systolic function is lower normal with ejection fraction estimated at   50-55%. There is mild hypokinesis of the apex. Diastolic filling parameters suggests normal diastolic filling pressure . Mild mitral annular calcification. Trivial tricuspid regurgitation without jet adequate to estimate systolic   pulmonary artery pressure (SPAP). Echocardiogram 8/2017  Summary   Normal left ventricle systolic function with an estimated ejection fraction   of 55%. No regional wall motion abnormalities are seen. Normal left ventricular diastolic filling pressure. The left atrium is mildly dilated. Mild mitral and tricuspid regurgitation. Systolic pulmonary artery pressure (SPAP) is normal and estimated at 34 mmHg   (RA pressure 3 mmHg).    Last echo on 11/23/2016 showed EF 50-55%. Stress echocardiogram 2017 [facility]   Study Conclusions     - Stress ECG conclusions: There were no stress arrhythmias or     conduction abnormalities. The stress ECG was negative for     ischemia. Maldonado scoring: exercise time of 9.5min; maximum ST     deviation of 0mm; no angina; resulting score is 10. This score     predicts a low risk of cardiac events. - Stress echo: Normal echo stress. Echocardiogram 6/6/2018  Summary   LV systolic function is normal with ejection fraction estimated at 55%. No regional wall motion abnormalities. Normal left ventricular diastolic filling pressure. Mild mitral regurgitation. Trivial tricuspid regurgitation. Systolic pulmonary artery pressure (SPAP) is normal estimated at 30 mmHg   (Right atrial pressure of 3 mmHg). Stress test 9/22/2018  Summary  Normal hemodynamic response to exercise. ECG portion of stress test is negative for ischemia by diagnostic criteria  (reached 93% of max predicted). Normal LV size and systolic function. Left ventricular ejection fraction of 73 %. There is a small, moderate intensity, fixed apical defect with hypokinesis,  consistent with infarct. There is a small, mild intensity, fixed inferolateral defect consistent with  diaphragmatic attenuation artifact. No evidence of myocardium at risk for significant reversible ischemia     Stress test 2019  IMPRESSION:   This is a normal study   Normal EF   The right ventricular size and function appears normal.     Good quality study   Attenuation artifact absent. No prior studies available for comparison. The risk of this study is low     Stress test 2/27/2021  Conclusions    Summary  There is normal isotope uptake at stress and rest.  No evidence of myocardium at risk for significant reversible ischemia. Normal LV function. Overall findings represent a low risk scan.      EKG 2/27/2021  NSR, rate 63    Echocardiogram 2/23/2022  Summary Patient had severe chest pain at 10: 00, stopped echo and activated rapid   response. Restated started test at 10:36. Frequent PVCs seen during 2nd portion of echo. -- Normal left ventricle size, wall thickness (upper limit of normal) and   systolic function with an estimated ejection fraction of 60-65%. No regional   wall motion abnormalities are seen. Normal left ventricular diastolic   filling pressure. -- Normal wall motion during chest pain. -- Trace mitral and tricuspid regurgitation. Left heart cath 2/23/2022  Procedure Findings:  1. Mild non-obstructive coronary artery disease. ~patent LAD stent  2. Normal left ventricular function with EF estimated at 55-60%  3. Normal left heart hemodynamics        Assessment:   Preoperative cardiac risk assessment for - low risk   Chest pain - atypical. Suspect sig non cardiac component. Also possible due to micrvascular dz. Resolved post add verapamil and Ranexa   Palpitations - decreased w/ verapamil   Coronary artery disease - stable  Mild mitral regurgitation. Hypertension - suboptimal   Hyperlipidemia - suboptimal. likely due to not consistently taking statin recently. Will monitor     Plan:  Ok for knee replacement- remain on Aspirin   Increase Verapamil to 240 mg daily- you can double up on what you have. Let us know if you have side effects   Cardiac medications reviewed including indications and pertinent side effects. Medication list updated at this visit. Check blood pressure and heart rate at home a few times per week- keep a log with dates and times and bring to office visit- blood pressure goal is 120-130/70-80's. Regular exercise and following a healthy diet encouraged   Follow up with me in 6 months     Thank you for allowing me to participate in the care of this individual.    Scribe's attestation:   This note was scribed in the presence of Dr. Susie Zarate M.D. By Ysabel Bar RN    The scribes documentation has been prepared under my direction and personally reviewed by me in its entirety. I confirm that the note above accurately reflects all work, treatment, procedures, and medical decision making performed by me. MD Emilia Cortez.  Alba Winter M.D., Janice Wilcox

## 2022-04-29 ENCOUNTER — OFFICE VISIT (OUTPATIENT)
Dept: CARDIOLOGY CLINIC | Age: 47
End: 2022-04-29
Payer: COMMERCIAL

## 2022-04-29 VITALS
OXYGEN SATURATION: 98 % | HEIGHT: 65 IN | WEIGHT: 225.4 LBS | SYSTOLIC BLOOD PRESSURE: 126 MMHG | DIASTOLIC BLOOD PRESSURE: 74 MMHG | HEART RATE: 70 BPM | BODY MASS INDEX: 37.55 KG/M2

## 2022-04-29 DIAGNOSIS — I25.118 CORONARY ARTERY DISEASE INVOLVING NATIVE CORONARY ARTERY OF NATIVE HEART WITH OTHER FORM OF ANGINA PECTORIS (HCC): Primary | Chronic | ICD-10-CM

## 2022-04-29 DIAGNOSIS — I10 PRIMARY HYPERTENSION: Chronic | ICD-10-CM

## 2022-04-29 DIAGNOSIS — E78.5 HYPERLIPIDEMIA, UNSPECIFIED HYPERLIPIDEMIA TYPE: Chronic | ICD-10-CM

## 2022-04-29 PROCEDURE — 99214 OFFICE O/P EST MOD 30 MIN: CPT | Performed by: INTERNAL MEDICINE

## 2022-04-29 RX ORDER — RANOLAZINE 500 MG/1
500 TABLET, EXTENDED RELEASE ORAL 2 TIMES DAILY
Qty: 180 TABLET | Refills: 3 | Status: SHIPPED | OUTPATIENT
Start: 2022-04-29

## 2022-04-29 RX ORDER — VERAPAMIL HYDROCHLORIDE 240 MG/1
240 TABLET, FILM COATED, EXTENDED RELEASE ORAL DAILY
Qty: 90 TABLET | Refills: 3 | Status: SHIPPED | OUTPATIENT
Start: 2022-04-29

## 2022-04-29 NOTE — LETTER
[address]  Phone: [phone]  Fax: [phone]    Payton Yoo MD        April 29, 2022     [address]  [facility] [address]  1975    To whom it may concern,            Anju Stoner has no cardiac contraindications to surgery. It is recommended that she continue Aspirin. If you have any questions or concerns, please don't hesitate to call.     Sincerely,        Payton Yoo MD

## 2022-04-29 NOTE — PATIENT INSTRUCTIONS
Plan:  Ok for knee replacement- continue Aspirin   Increase Verapamil to 240 mg daily- you can double up on what you have. Let us know if you have side effects   Cardiac medications reviewed including indications and pertinent side effects. Medication list updated at this visit. Check blood pressure and heart rate at home a few times per week- keep a log with dates and times and bring to office visit- blood pressure goal is 120-130/70-80's.    Regular exercise and following a healthy diet encouraged   Follow up with me in 6 months

## 2022-05-31 ENCOUNTER — OFFICE VISIT (OUTPATIENT)
Dept: FAMILY MEDICINE CLINIC | Age: 47
End: 2022-05-31
Payer: COMMERCIAL

## 2022-05-31 VITALS
SYSTOLIC BLOOD PRESSURE: 158 MMHG | WEIGHT: 230 LBS | OXYGEN SATURATION: 98 % | HEART RATE: 59 BPM | BODY MASS INDEX: 38.32 KG/M2 | HEIGHT: 65 IN | TEMPERATURE: 97.6 F | DIASTOLIC BLOOD PRESSURE: 84 MMHG

## 2022-05-31 DIAGNOSIS — Z01.818 PREOP EXAMINATION: Primary | ICD-10-CM

## 2022-05-31 DIAGNOSIS — M17.32 POST-TRAUMATIC OSTEOARTHRITIS OF LEFT KNEE: ICD-10-CM

## 2022-05-31 PROCEDURE — 99242 OFF/OP CONSLTJ NEW/EST SF 20: CPT | Performed by: FAMILY MEDICINE

## 2022-05-31 ASSESSMENT — ENCOUNTER SYMPTOMS
STRIDOR: 0
CHEST TIGHTNESS: 0
WHEEZING: 0
BACK PAIN: 0
ABDOMINAL PAIN: 0
COUGH: 0
SHORTNESS OF BREATH: 0
CHOKING: 0

## 2022-05-31 NOTE — PROGRESS NOTES
Subjective:      Patient ID: Barney Quintanilla is a 48 y.o. female. JUDE Hi is here for preop exam prior to total knee replacement surgery on the left. She has already received cardiac clearance. She feels well and has no new complaints or problems. Review of Systems   Constitutional: Negative for activity change, appetite change, chills, diaphoresis, fatigue, fever and unexpected weight change. Respiratory: Negative for cough, choking, chest tightness, shortness of breath, wheezing and stridor. Cardiovascular: Negative for chest pain, palpitations and leg swelling. Gastrointestinal: Negative for abdominal pain. Genitourinary: Negative for difficulty urinating. Musculoskeletal: Positive for arthralgias (L knee). Negative for back pain. Skin: Negative for rash. Neurological: Negative for dizziness. Objective:   Physical Exam  Vitals and nursing note reviewed. Constitutional:       Appearance: She is well-developed. HENT:      Head: Normocephalic and atraumatic. Right Ear: External ear normal.      Left Ear: External ear normal.      Nose: Nose normal.   Eyes:      Conjunctiva/sclera: Conjunctivae normal.      Pupils: Pupils are equal, round, and reactive to light. Neck:      Thyroid: No thyromegaly. Vascular: No JVD. Trachea: No tracheal deviation. Cardiovascular:      Rate and Rhythm: Normal rate and regular rhythm. Heart sounds: Normal heart sounds. No murmur heard. No friction rub. No gallop. Pulmonary:      Effort: Pulmonary effort is normal. No respiratory distress. Breath sounds: Normal breath sounds. No stridor. No wheezing or rales. Chest:      Chest wall: No tenderness. Abdominal:      General: Bowel sounds are normal. There is no distension. Palpations: Abdomen is soft. There is no mass. Tenderness: There is no abdominal tenderness. There is no guarding or rebound.    Musculoskeletal:         General: Tenderness (l knee) present. Normal range of motion. Cervical back: Normal range of motion and neck supple. Lymphadenopathy:      Cervical: No cervical adenopathy. Skin:     General: Skin is warm and dry. Coloration: Skin is not pale. Findings: No erythema or rash. Neurological:      Mental Status: She is alert and oriented to person, place, and time. Cranial Nerves: No cranial nerve deficit. Motor: No abnormal muscle tone. Coordination: Coordination normal.      Deep Tendon Reflexes: Reflexes are normal and symmetric. Reflexes normal.         Assessment / Plan:        1.   Preop examination

## 2022-06-23 ENCOUNTER — APPOINTMENT (OUTPATIENT)
Dept: GENERAL RADIOLOGY | Age: 47
End: 2022-06-23
Payer: COMMERCIAL

## 2022-06-23 ENCOUNTER — HOSPITAL ENCOUNTER (EMERGENCY)
Age: 47
Discharge: HOME | End: 2022-06-24
Payer: COMMERCIAL

## 2022-06-23 ENCOUNTER — APPOINTMENT (OUTPATIENT)
Dept: CT IMAGING | Age: 47
End: 2022-06-23
Payer: COMMERCIAL

## 2022-06-23 DIAGNOSIS — G89.18 ACUTE POST-OPERATIVE PAIN: Primary | ICD-10-CM

## 2022-06-23 DIAGNOSIS — R06.02 SHORTNESS OF BREATH: ICD-10-CM

## 2022-06-23 DIAGNOSIS — Z96.652 S/P TKR (TOTAL KNEE REPLACEMENT), LEFT: ICD-10-CM

## 2022-06-23 DIAGNOSIS — R07.9 CHEST PAIN, UNSPECIFIED TYPE: ICD-10-CM

## 2022-06-23 DIAGNOSIS — M25.562 ACUTE PAIN OF LEFT KNEE: ICD-10-CM

## 2022-06-23 LAB
A/G RATIO: 1 (ref 1.1–2.2)
ALBUMIN SERPL-MCNC: 3.4 G/DL (ref 3.4–5)
ALP BLD-CCNC: 71 U/L (ref 40–129)
ALT SERPL-CCNC: 24 U/L (ref 10–40)
ANION GAP SERPL CALCULATED.3IONS-SCNC: 11 MMOL/L (ref 3–16)
AST SERPL-CCNC: 47 U/L (ref 15–37)
BASOPHILS ABSOLUTE: 0.1 K/UL (ref 0–0.2)
BASOPHILS RELATIVE PERCENT: 0.4 %
BILIRUB SERPL-MCNC: <0.2 MG/DL (ref 0–1)
BUN BLDV-MCNC: 13 MG/DL (ref 7–20)
CALCIUM SERPL-MCNC: 9 MG/DL (ref 8.3–10.6)
CHLORIDE BLD-SCNC: 96 MMOL/L (ref 99–110)
CO2: 26 MMOL/L (ref 21–32)
CREAT SERPL-MCNC: 0.7 MG/DL (ref 0.6–1.1)
EOSINOPHILS ABSOLUTE: 0.2 K/UL (ref 0–0.6)
EOSINOPHILS RELATIVE PERCENT: 1.5 %
GFR AFRICAN AMERICAN: >60
GFR NON-AFRICAN AMERICAN: >60
GLUCOSE BLD-MCNC: 118 MG/DL (ref 70–99)
HCG QUALITATIVE: NEGATIVE
HCT VFR BLD CALC: 26 % (ref 36–48)
HEMOGLOBIN: 8.5 G/DL (ref 12–16)
LYMPHOCYTES ABSOLUTE: 1.8 K/UL (ref 1–5.1)
LYMPHOCYTES RELATIVE PERCENT: 13.2 %
MCH RBC QN AUTO: 26.9 PG (ref 26–34)
MCHC RBC AUTO-ENTMCNC: 32.8 G/DL (ref 31–36)
MCV RBC AUTO: 81.9 FL (ref 80–100)
MONOCYTES ABSOLUTE: 0.9 K/UL (ref 0–1.3)
MONOCYTES RELATIVE PERCENT: 6.7 %
NEUTROPHILS ABSOLUTE: 10.9 K/UL (ref 1.7–7.7)
NEUTROPHILS RELATIVE PERCENT: 78.2 %
PDW BLD-RTO: 13.9 % (ref 12.4–15.4)
PLATELET # BLD: 297 K/UL (ref 135–450)
PMV BLD AUTO: 9.4 FL (ref 5–10.5)
POTASSIUM REFLEX MAGNESIUM: 5.3 MMOL/L (ref 3.5–5.1)
RBC # BLD: 3.17 M/UL (ref 4–5.2)
SODIUM BLD-SCNC: 133 MMOL/L (ref 136–145)
TOTAL PROTEIN: 6.9 G/DL (ref 6.4–8.2)
TROPONIN: <0.01 NG/ML
WBC # BLD: 13.9 K/UL (ref 4–11)

## 2022-06-23 PROCEDURE — 99285 EMERGENCY DEPT VISIT HI MDM: CPT

## 2022-06-23 PROCEDURE — 71260 CT THORAX DX C+: CPT

## 2022-06-23 PROCEDURE — 80053 COMPREHEN METABOLIC PANEL: CPT

## 2022-06-23 PROCEDURE — 84703 CHORIONIC GONADOTROPIN ASSAY: CPT

## 2022-06-23 PROCEDURE — 85025 COMPLETE CBC W/AUTO DIFF WBC: CPT

## 2022-06-23 PROCEDURE — 2580000003 HC RX 258: Performed by: NURSE PRACTITIONER

## 2022-06-23 PROCEDURE — 71045 X-RAY EXAM CHEST 1 VIEW: CPT

## 2022-06-23 PROCEDURE — 6370000000 HC RX 637 (ALT 250 FOR IP): Performed by: NURSE PRACTITIONER

## 2022-06-23 PROCEDURE — 96375 TX/PRO/DX INJ NEW DRUG ADDON: CPT

## 2022-06-23 PROCEDURE — 6360000002 HC RX W HCPCS: Performed by: NURSE PRACTITIONER

## 2022-06-23 PROCEDURE — 84484 ASSAY OF TROPONIN QUANT: CPT

## 2022-06-23 PROCEDURE — 96361 HYDRATE IV INFUSION ADD-ON: CPT

## 2022-06-23 PROCEDURE — 6360000004 HC RX CONTRAST MEDICATION: Performed by: NURSE PRACTITIONER

## 2022-06-23 PROCEDURE — 96376 TX/PRO/DX INJ SAME DRUG ADON: CPT

## 2022-06-23 PROCEDURE — 96374 THER/PROPH/DIAG INJ IV PUSH: CPT

## 2022-06-23 PROCEDURE — 93005 ELECTROCARDIOGRAM TRACING: CPT | Performed by: STUDENT IN AN ORGANIZED HEALTH CARE EDUCATION/TRAINING PROGRAM

## 2022-06-23 RX ORDER — 0.9 % SODIUM CHLORIDE 0.9 %
1000 INTRAVENOUS SOLUTION INTRAVENOUS ONCE
Status: COMPLETED | OUTPATIENT
Start: 2022-06-23 | End: 2022-06-23

## 2022-06-23 RX ORDER — AMOXICILLIN 250 MG
1 CAPSULE ORAL 2 TIMES DAILY
COMMUNITY

## 2022-06-23 RX ORDER — FAMOTIDINE 20 MG/1
20 TABLET, FILM COATED ORAL 2 TIMES DAILY
COMMUNITY
Start: 2022-06-21

## 2022-06-23 RX ORDER — OXYCODONE HYDROCHLORIDE AND ACETAMINOPHEN 5; 325 MG/1; MG/1
2 TABLET ORAL ONCE
Status: COMPLETED | OUTPATIENT
Start: 2022-06-23 | End: 2022-06-23

## 2022-06-23 RX ORDER — ONDANSETRON 4 MG/1
4 TABLET, ORALLY DISINTEGRATING ORAL EVERY 8 HOURS PRN
Qty: 20 TABLET | Refills: 0 | Status: SHIPPED | OUTPATIENT
Start: 2022-06-23

## 2022-06-23 RX ORDER — DIAZEPAM 5 MG/1
5 TABLET ORAL EVERY 6 HOURS PRN
Qty: 6 TABLET | Refills: 0 | Status: SHIPPED | OUTPATIENT
Start: 2022-06-23 | End: 2022-06-26

## 2022-06-23 RX ORDER — ONDANSETRON 2 MG/ML
4 INJECTION INTRAMUSCULAR; INTRAVENOUS EVERY 30 MIN PRN
Status: DISCONTINUED | OUTPATIENT
Start: 2022-06-23 | End: 2022-06-24 | Stop reason: HOSPADM

## 2022-06-23 RX ORDER — CELECOXIB 200 MG/1
200 CAPSULE ORAL DAILY
COMMUNITY
Start: 2022-06-21

## 2022-06-23 RX ORDER — DIAZEPAM 5 MG/1
5 TABLET ORAL ONCE
Status: COMPLETED | OUTPATIENT
Start: 2022-06-23 | End: 2022-06-23

## 2022-06-23 RX ORDER — ACETAMINOPHEN 325 MG/1
975 TABLET ORAL EVERY 8 HOURS PRN
COMMUNITY
Start: 2022-06-21

## 2022-06-23 RX ORDER — ONDANSETRON 2 MG/ML
4 INJECTION INTRAMUSCULAR; INTRAVENOUS EVERY 30 MIN PRN
Status: COMPLETED | OUTPATIENT
Start: 2022-06-23 | End: 2022-06-23

## 2022-06-23 RX ORDER — OXYCODONE HYDROCHLORIDE 5 MG/1
5 TABLET ORAL EVERY 6 HOURS PRN
COMMUNITY
Start: 2022-06-21 | End: 2022-06-28

## 2022-06-23 RX ADMIN — ONDANSETRON 4 MG: 2 INJECTION INTRAMUSCULAR; INTRAVENOUS at 21:31

## 2022-06-23 RX ADMIN — IOPAMIDOL 75 ML: 755 INJECTION, SOLUTION INTRAVENOUS at 22:54

## 2022-06-23 RX ADMIN — ONDANSETRON 4 MG: 2 INJECTION INTRAMUSCULAR; INTRAVENOUS at 22:29

## 2022-06-23 RX ADMIN — HYDROMORPHONE HYDROCHLORIDE 1 MG: 1 INJECTION, SOLUTION INTRAMUSCULAR; INTRAVENOUS; SUBCUTANEOUS at 21:32

## 2022-06-23 RX ADMIN — OXYCODONE AND ACETAMINOPHEN 2 TABLET: 5; 325 TABLET ORAL at 23:51

## 2022-06-23 RX ADMIN — SODIUM CHLORIDE 1000 ML: 9 INJECTION, SOLUTION INTRAVENOUS at 21:31

## 2022-06-23 RX ADMIN — DIAZEPAM 5 MG: 5 TABLET ORAL at 22:28

## 2022-06-23 RX ADMIN — ONDANSETRON 4 MG: 2 INJECTION INTRAMUSCULAR; INTRAVENOUS at 23:51

## 2022-06-23 ASSESSMENT — PAIN - FUNCTIONAL ASSESSMENT: PAIN_FUNCTIONAL_ASSESSMENT: 0-10

## 2022-06-23 ASSESSMENT — PAIN DESCRIPTION - LOCATION: LOCATION: KNEE

## 2022-06-23 ASSESSMENT — PAIN SCALES - GENERAL
PAINLEVEL_OUTOF10: 5
PAINLEVEL_OUTOF10: 7
PAINLEVEL_OUTOF10: 10
PAINLEVEL_OUTOF10: 10

## 2022-06-23 ASSESSMENT — PAIN DESCRIPTION - ONSET: ONSET: ON-GOING

## 2022-06-23 ASSESSMENT — PAIN DESCRIPTION - FREQUENCY: FREQUENCY: CONTINUOUS

## 2022-06-23 ASSESSMENT — PAIN DESCRIPTION - ORIENTATION: ORIENTATION: LEFT

## 2022-06-23 ASSESSMENT — PAIN DESCRIPTION - PAIN TYPE: TYPE: ACUTE PAIN

## 2022-06-24 VITALS
OXYGEN SATURATION: 100 % | SYSTOLIC BLOOD PRESSURE: 115 MMHG | DIASTOLIC BLOOD PRESSURE: 60 MMHG | WEIGHT: 230 LBS | HEIGHT: 66 IN | TEMPERATURE: 98.6 F | BODY MASS INDEX: 36.96 KG/M2 | HEART RATE: 72 BPM | RESPIRATION RATE: 17 BRPM

## 2022-06-24 LAB
EKG ATRIAL RATE: 57 BPM
EKG DIAGNOSIS: NORMAL
EKG P AXIS: 52 DEGREES
EKG P-R INTERVAL: 138 MS
EKG Q-T INTERVAL: 398 MS
EKG QRS DURATION: 80 MS
EKG QTC CALCULATION (BAZETT): 387 MS
EKG R AXIS: 32 DEGREES
EKG T AXIS: 57 DEGREES
EKG VENTRICULAR RATE: 57 BPM

## 2022-06-24 PROCEDURE — 93010 ELECTROCARDIOGRAM REPORT: CPT | Performed by: INTERNAL MEDICINE

## 2022-06-24 ASSESSMENT — PAIN SCALES - GENERAL: PAINLEVEL_OUTOF10: 5

## 2022-06-24 NOTE — ED PROVIDER NOTES
Evaluated by [id number] [facility] Provider    [facility]  ED    CHIEF COMPLAINT  Knee Pain (Pt had a left knee replacement on Monday at [facility]. Pt unable to control pain at home. Tried to double up Oxycodone, but unable to keep down. Pt hyperventilating on arrival to ED c/o CP and SOB.)    HISTORY OF PRESENT ILLNESS  Sussy Lanza is a 48 y.o. female who presents to the ED complaining of knee pain. Left knee replacement on Monday. She had a \"Karla cocktail\" and it wore off yesterday. Reports her pain started today, she reports she has doubled up on her pain meds but it is not helping because she can not keep anything down. DOE's been vomiting since about 12 pm today. She reports feeling like she has a fever. Patient was hyperventilating on arrival in the ER. Prescribed Oxycodone, tylenol, celebrex, pepcid, blood thinner. She had her surgery done per Dr. Johns at [facility]. CP and SOB started today, unsure what time. Reports feeling dizzy, this also just started today. Vomiting did not start until today. The patient is currently rating their pain as 10/10 and describes it as a aching type of pain. The patient arrived to the ED via EMS transport. PAST MEDICAL HISTORY    Past Medical History:   Diagnosis Date    CAD (coronary artery disease)     GERD (gastroesophageal reflux disease) 6/6/2018    Heart attack ([address] 11/19/2016    Hyperlipidemia 12/29/2016    Hypertension     Obesity (BMI 30-39. 9) 8/12/2017       SURGICAL HISTORY    Past Surgical History:   Procedure Laterality Date    APPENDECTOMY      CORONARY ANGIOPLASTY WITH STENT PLACEMENT      KNEE ARTHROSCOPY      KNEE ARTHROSCOPY Left 03/06/2019    medial menisectomy    KNEE ARTHROSCOPY Left 3/6/2019    LEFT KNEE VIDEO ARTHROSCOPY, PARTIAL MEDIAL MENISCECTOMY, CHONDROPLASTY performed by Amanda Van DO at [facility] SURGERY      neck    TONSILLECTOMY         CURRENT MEDICATIONS    Current Outpatient Rx   Medication Sig Dispense Refill    apixaban (ELIQUIS) 2.5 MG TABS tablet Take 2.5 mg by mouth 2 times daily      oxyCODONE (ROXICODONE) 5 MG immediate release tablet Take 5 mg by mouth every 6 hours as needed.  acetaminophen (TYLENOL) 325 MG tablet Take 975 mg by mouth every 8 hours as needed      senna-docusate (PERICOLACE) 8.6-50 MG per tablet Take 1 tablet by mouth in the morning and at bedtime      ondansetron (ZOFRAN ODT) 4 MG disintegrating tablet Take 1 tablet by mouth every 8 hours as needed for Nausea 20 tablet 0    diazePAM (VALIUM) 5 MG tablet Take 1 tablet by mouth every 6 hours as needed (pain/muscle spasm) for up to 3 days. 6 tablet 0    celecoxib (CELEBREX) 200 MG capsule Take 200 mg by mouth daily       famotidine (PEPCID) 20 MG tablet Take 20 mg by mouth 2 times daily       verapamil (CALAN SR) 240 MG extended release tablet Take 1 tablet by mouth daily 90 tablet 3    ranolazine (RANEXA) 500 MG extended release tablet Take 1 tablet by mouth 2 times daily 180 tablet 3    sulindac (CLINORIL) 200 MG tablet TAKE 1 TABLET BY MOUTH TWO TIMES A DAY 60 tablet 0    atenolol (TENORMIN) 50 MG tablet TAKE 1 TABLET BY MOUTH EVERY DAY 30 tablet 3    losartan (COZAAR) 100 MG tablet Take 1 tablet by mouth daily 90 tablet 3    nitroGLYCERIN (NITROSTAT) 0.4 MG SL tablet up to max of 3 total doses.  If no relief after 1 dose, call 911. 25 tablet 3    rosuvastatin (CRESTOR) 40 MG tablet TAKE 1 TABLET BY MOUTH IN THE EVENING 90 tablet 3    levothyroxine (SYNTHROID) 75 MCG tablet TAKE 1 TABLET BY MOUTH ONE TIME A DAY 30 tablet 9    ibuprofen (ADVIL;MOTRIN) 800 MG tablet Take 1 tablet by mouth every 8 hours as needed for Pain or Fever 20 tablet 0    ferrous sulfate 325 (65 Fe) MG tablet Take 325 mg by mouth daily (with breakfast)      vitamin D3 (CHOLECALCIFEROL) 400 units TABS tablet Take 2 tablets by mouth daily (Patient taking differently: Take 800 Units by mouth daily 1 tab daily 22) 30 tablet 0    Ascorbic Acid (VITAMIN C) 500 MG tablet Take 500 mg by mouth daily      aspirin 81 MG tablet Take 81 mg by mouth daily      therapeutic multivitamin-minerals (THERAGRAN-M) tablet Take 1 tablet by mouth daily. ALLERGIES    Allergies   Allergen Reactions    Olmesartan        FAMILY HISTORY    Family History   Problem Relation Age of Onset    Heart Disease Mother     High Blood Pressure Mother     Heart Disease Father 54    High Blood Pressure Father        SOCIAL HISTORY    Social History     Socioeconomic History    Marital status:      Spouse name: None    Number of children: None    Years of education: None    Highest education level: None   Occupational History    None   Tobacco Use    Smoking status: Former Smoker     Packs/day: 0.50     Years: 10.00     Pack years: 5.00     Types: Cigarettes     Quit date: 2016     Years since quittin.6    Smokeless tobacco: Never Used   Vaping Use    Vaping Use: Never used   Substance and Sexual Activity    Alcohol use: Yes     Comment: occasional    Drug use: No    Sexual activity: Yes     Partners: Male   Other Topics Concern    None   Social History Narrative    ** Merged History Encounter **          Social Determinants of Health     Financial Resource Strain: Low Risk     Difficulty of Paying Living Expenses: Not very hard   Food Insecurity: No Food Insecurity    Worried About Running Out of Food in the Last Year: Never true    Rober of Food in the Last Year: Never true   Transportation Needs:     Lack of Transportation (Medical): Not on file    Lack of Transportation (Non-Medical):  Not on file   Physical Activity:     Days of Exercise per Week: Not on file    Minutes of Exercise per Session: Not on file   Stress:     Feeling of Stress : Not on file   Social Connections:     Frequency of Communication with Friends and Family: Not on file  Frequency of Social Gatherings with Friends and Family: Not on file    Attends [employer] Services: Not on file    Active Member of Clubs or Organizations: Not on file    Attends Club or Organization Meetings: Not on file    Marital Status: Not on file   Intimate Partner Violence:     Fear of Current or Ex-Partner: Not on file    Emotionally Abused: Not on file    Physically Abused: Not on file    Sexually Abused: Not on file   Housing Stability:     Unable to Pay for Housing in the Last Year: Not on file    Number of [place] in the Last Year: Not on file    Unstable Housing in the Last Year: Not on file       REVIEW OF SYSTEMS    10 systems reviewed, pertinent positives per HPI otherwise noted to be negative    PHYSICAL EXAM  Vitals:    06/24/22 0006   BP: 115/60   Pulse: 72   Resp: 17   Temp:    SpO2: 100%     GENERAL: Patient is well-developed, well-nourished. Awake and alert. Cooperative. Resting in bed. Significantly distressed due to her current symptoms, not toxic in appearance. HEENT:  Normocephalic, atraumatic. Conjunctiva appear normal. Sclera is non-icteric. External ears are normal.    NECK: Supple with normal ROM. Trachea midline. LUNGS: Equal and symmetric chest rise. Breathing is unlabored. Speaking comfortably in full sentences. Lungs are clear bilaterally to auscultation. Without wheezing, rales, or rhonchi. CADIOVASCULAR:  Regular rate and rhythm. Normal S1-S2 sounds. No murmurs, rubs, or gallops. Capillary refill is brisk in all 4 extremities. Bilateral lower extremities are equal in size, there is swelling observed to her left lower extremity. There is tenderness to palpation around the knee. There is erythema observed to the knee on either side of the knee dressing, there is some warmth palpated. GI: Soft, nontender, nondistended with positive bowel sounds. No rebound tenderness, guarding or any peritoneal signs.   No masses or hepatosplenomegaly    MUSCULOSKELETAL: Left knee with limited ROM due to pain, swelling and recent surgery. To all other extremities: No gross deformities or trauma noted. Moving all extremities equally and appropriately. Normal ROM. SKIN: Warm/dry. Left knee dressing is dry and intact. No rashes/lesions noted. PSYCHIATRIC: Mood and affect appropriate. Speech is clear and articulate. NEUROLOGIC: Alert and oriented. No focal motor or sensory deficits. LABS  I have reviewed all labs for this visit. Labs Reviewed   CBC WITH AUTO DIFFERENTIAL - Abnormal; Notable for the following components:       Result Value    WBC 13.9 (*)     RBC 3.17 (*)     Hemoglobin 8.5 (*)     Hematocrit 26.0 (*)     Neutrophils Absolute 10.9 (*)     All other components within normal limits   COMPREHENSIVE METABOLIC PANEL W/ REFLEX TO MG FOR LOW K - Abnormal; Notable for the following components:    Sodium 133 (*)     Potassium reflex Magnesium 5.3 (*)     Chloride 96 (*)     Glucose 118 (*)     Albumin/Globulin Ratio 1.0 (*)     AST 47 (*)     All other components within normal limits   TROPONIN   HCG, SERUM, QUALITATIVE     RADIOLOGY    XR CHEST PORTABLE    Result Date: 6/23/2022  EXAMINATION: ONE XRAY VIEW OF THE CHEST 6/23/2022 9:26 pm COMPARISON: 02/23/2022 HISTORY: ORDERING SYSTEM PROVIDED HISTORY: SOB TECHNOLOGIST PROVIDED HISTORY: Reason for exam:->SOB Reason for Exam: sob FINDINGS: The heart is mildly enlarged and more prominent. The pulmonary vessels are normal.  No consolidation or effusion is seen. The bones are intact. Mild cardiomegaly which is more prominent with no acute pulmonary abnormality. CT CHEST PULMONARY EMBOLISM W CONTRAST    Result Date: 6/23/2022  EXAMINATION: CTA OF THE CHEST 6/23/2022 10:34 pm TECHNIQUE: CTA of the chest was performed after the administration of intravenous contrast.  Multiplanar reformatted images are provided for review. MIP images are provided for review.  Automated exposure control, iterative reconstruction, and/or weight based adjustment of the mA/kV was utilized to reduce the radiation dose to as low as reasonably achievable. COMPARISON: 02/22/2022 and portable chest 06/23/2022 HISTORY: ORDERING SYSTEM PROVIDED HISTORY: CP, SOB after knee surgery TECHNOLOGIST PROVIDED HISTORY: Reason for exam:->CP, SOB after knee surgery Decision Support Exception - unselect if not a suspected or confirmed emergency medical condition->Emergency Medical Condition (MA) Reason for Exam: CP, SOB after knee sx Relevant Medical/Surgical History: CAD, Coronary stent FINDINGS: Pulmonary Arteries: Pulmonary arteries are adequately opacified for evaluation. No evidence of intraluminal filling defect to suggest pulmonary embolism. Main pulmonary artery is normal in caliber. Mediastinum: No evidence of mediastinal lymphadenopathy. The heart size is within normal limits. The heart and pericardium demonstrate no acute abnormality. There is no acute abnormality of the thoracic aorta. Lungs/pleura: There is mild bibasilar dependent atelectasis. There are a few small patchy ground-glass opacities in the left upper lobe unchanged from 02/22/2022. Lungs are otherwise clear. There is no pneumothorax pleural effusion. Upper Abdomen: Limited images of the upper abdomen are unremarkable. Soft Tissues/Bones: No acute bone or soft tissue abnormality. Lower cervical fusion hardware. No evidence of an acute pulmonary embolus. Mild bibasilar atelectasis. Few stable small ground-glass opacities in the left upper lobe. ED COURSE/MDM  Patient seen and evaluated. Old records reviewed. Diagnostic testing reviewed and results discussed. I have evaluated this patient. My supervising physician was available for consultation. Barney Quintanilla presented to the ED today with above noted complaints. Arrival vital signs: Afebrile and hemodynamically stable. Well saturated on room air.   Physical exam performed at [phone]: Patient has no adventitious breath sounds on exam.  No reproducible abdominal tenderness to palpation. She has tenderness to palpation left knee, left knee dressing is dry and intact, there is some erythema noted outside of the dressing. Range of motion not attempted due to patient's recent knee surgery. Blood work: There is a slight leukocytosis present as WBC elevated at 13.9, absolute neutrophils elevated at 10.9. No further differential shift. There is an anemia present, patient did have surgery on Monday so I feel that this is due to her recent surgery. No significant electrolyte abnormality. No evidence of acute kidney injury. AST is slightly elevated at 47, no other liver function abnormality. Troponin is negative. Pregnancy test is negative. EKG: Sinus bradycardia no acute findings. Imaging: Chest x-ray shows mild cardiomegaly that is more prominent with no acute pulmonary abnormality. CT of the chest was obtained to rule out PE due to her recent surgery and reports of chest pain and shortness of breath, this showed no evidence of an acute PE. Mild bibasilar atelectasis. Few stable small groundglass opacities in the left upper lobe. Medications given in the ED:   Medications   ondansetron (ZOFRAN) injection 4 mg (4 mg IntraVENous Given 6/23/22 2351)   HYDROmorphone (DILAUDID) injection 1 mg (1 mg IntraVENous Given 6/23/22 2132)   ondansetron (ZOFRAN) injection 4 mg (4 mg IntraVENous Given 6/23/22 2229)   0.9 % sodium chloride bolus (0 mLs IntraVENous Stopped 6/23/22 2231)   diazePAM (VALIUM) tablet 5 mg (5 mg Oral Given 6/23/22 2228)   iopamidol (ISOVUE-370) 76 % injection 75 mL (75 mLs IntraVENous Given 6/23/22 2254)   oxyCODONE-acetaminophen (PERCOCET) 5-325 MG per tablet 2 tablet (2 tablets Oral Given 6/23/22 2351)      Patient was given Dilaudid, Zofran and once the nausea medicine was given we did attempt a p.o. challenge. She was able to tolerate this. She was then given Valium.   Patient has had improvement of her pain with the Dilaudid and Valium. Patient was ordered Percocet for her pain as she has been taking Oxy IR with Tylenol separate. Patient told the nurse caring for her that she cannot tolerate Percocet as it causes vomiting. I advised the patient that Percocet and oxycodone IR ordered the same thing, patient states that this is probably why she has been experiencing so much vomiting at home. We discussed changing her medication to hydrocodone however I am concerned that if we switch her medication her pain may not be controlled. I am going to discharge her with Zofran to help with nausea and vomiting, I advised her that if this does not help then she should follow-up with her orthopedist to discuss changing her pain medication. I will discharge her with a very short supply of Valium to see if this helps with pain control. Patient advised to follow-up with orthopedist and given strict ED return precautions. I did specifically tell her to call her orthopedist tomorrow about the erythema that is noted to both sides of the knee to see if they would want to do any further follow-up at this time. At this point I do not feel the patient requires further work up and it is reasonable to discharge the patient. Please refer to AVS for further details regarding discharge instructions. A discussion was had with the patient regarding diagnosis, diagnostic testing results, treatment/ plan of care, and follow up. All questions were answered. Patient will follow up as directed for further evaluation/treatment. The patient was given strict return precautions as we discussed symptoms that would necessitate return to the ED. Patient will return to ED for new/worsening symptoms. The patient verbalized their understanding and agreement with the above plan.     I estimate there is LOW risk for ACUTE CORONARY SYNDROME, INTRACRANIAL HEMORRHAGE, MALIGNANT DYSRHYTHMIA or HYPERTENSION, PULMONARY EMBOLISM, discharged to home in good condition. Comment: Please note this report has been produced using speech recognition software and may contain errors related to that system including errors in grammar, punctuation, and spelling, as well as words and phrases that may be inappropriate. If there are any questions or concerns please feel free to contact the dictating provider for clarification.         ALIYAH Mcdonough - CECY  06/24/22 0190

## 2022-06-24 NOTE — ED NOTES
All discharge paperwork and follow-up instructions reviewed with pt. Pt verbalized understanding. Pt discharged via wheelchair in stable condition to private vehicle with Family.      Earline Camarillo RN  06/24/22 0665

## 2022-08-23 ENCOUNTER — TELEPHONE (OUTPATIENT)
Dept: FAMILY MEDICINE CLINIC | Age: 47
End: 2022-08-23

## 2022-08-23 ASSESSMENT — PATIENT HEALTH QUESTIONNAIRE - PHQ9
SUM OF ALL RESPONSES TO PHQ QUESTIONS 1-9: 0
2. FEELING DOWN, DEPRESSED OR HOPELESS: 0
SUM OF ALL RESPONSES TO PHQ QUESTIONS 1-9: 0
SUM OF ALL RESPONSES TO PHQ QUESTIONS 1-9: 0
1. LITTLE INTEREST OR PLEASURE IN DOING THINGS: 0
SUM OF ALL RESPONSES TO PHQ QUESTIONS 1-9: 0
SUM OF ALL RESPONSES TO PHQ9 QUESTIONS 1 & 2: 0

## 2022-08-23 ASSESSMENT — ANXIETY QUESTIONNAIRES
7. FEELING AFRAID AS IF SOMETHING AWFUL MIGHT HAPPEN: 0
5. BEING SO RESTLESS THAT IT IS HARD TO SIT STILL: 0
4. TROUBLE RELAXING: 0
1. FEELING NERVOUS, ANXIOUS, OR ON EDGE: 0
2. NOT BEING ABLE TO STOP OR CONTROL WORRYING: 0
GAD7 TOTAL SCORE: 0
6. BECOMING EASILY ANNOYED OR IRRITABLE: 0
IF YOU CHECKED OFF ANY PROBLEMS ON THIS QUESTIONNAIRE, HOW DIFFICULT HAVE THESE PROBLEMS MADE IT FOR YOU TO DO YOUR WORK, TAKE CARE OF THINGS AT HOME, OR GET ALONG WITH OTHER PEOPLE: NOT DIFFICULT AT ALL
3. WORRYING TOO MUCH ABOUT DIFFERENT THINGS: 0

## 2022-08-23 NOTE — TELEPHONE ENCOUNTER
Patient had a 3:00 appt today with you, but woke up with a fever and sore throat this morning, so was unable to come into the office for her rash. She was in tears because this rash has been really irritating her and bleeding. She stated it started on her lower back and trunk, and she thought it may have been yeast because she was sitting around and sweating a lot after her knee surgery. She stated that healed and dried up, but now it's on her arms and leg. I rescheduled her for a virtual visit with Laury Issa tomorrow morning, but she was in tears over this.  Are you able to reach out to her?    5/31/2022 last ov    Future Appointments   Date Time Provider Hari Chowdhury   8/24/2022  8:20 AM ALIYAH Villarreal CNP

## 2022-08-24 ENCOUNTER — TELEMEDICINE (OUTPATIENT)
Dept: FAMILY MEDICINE CLINIC | Age: 47
End: 2022-08-24
Payer: COMMERCIAL

## 2022-08-24 DIAGNOSIS — R68.89 FLU-LIKE SYMPTOMS: Primary | ICD-10-CM

## 2022-08-24 DIAGNOSIS — R68.89 FLU-LIKE SYMPTOMS: ICD-10-CM

## 2022-08-24 DIAGNOSIS — R21 RASH AND OTHER NONSPECIFIC SKIN ERUPTION: ICD-10-CM

## 2022-08-24 PROBLEM — M17.12 ARTHRITIS OF KNEE, LEFT: Status: ACTIVE | Noted: 2022-02-18

## 2022-08-24 PROBLEM — M17.11 ARTHRITIS OF KNEE, RIGHT: Status: ACTIVE | Noted: 2022-02-18

## 2022-08-24 PROCEDURE — 99214 OFFICE O/P EST MOD 30 MIN: CPT | Performed by: NURSE PRACTITIONER

## 2022-08-24 RX ORDER — RANOLAZINE 500 MG/1
500 TABLET, EXTENDED RELEASE ORAL EVERY MORNING
COMMUNITY

## 2022-08-24 RX ORDER — DEXTROMETHORPHAN HYDROBROMIDE AND PROMETHAZINE HYDROCHLORIDE 15; 6.25 MG/5ML; MG/5ML
5 SYRUP ORAL 4 TIMES DAILY PRN
Qty: 240 ML | Refills: 0 | Status: SHIPPED | OUTPATIENT
Start: 2022-08-24

## 2022-08-24 RX ORDER — CLOTRIMAZOLE AND BETAMETHASONE DIPROPIONATE 10; .64 MG/G; MG/G
CREAM TOPICAL
Qty: 45 G | Refills: 1 | Status: SHIPPED | OUTPATIENT
Start: 2022-08-24

## 2022-08-24 ASSESSMENT — ENCOUNTER SYMPTOMS
RHINORRHEA: 0
SORE THROAT: 1
TROUBLE SWALLOWING: 1
VOMITING: 0
COUGH: 1
SHORTNESS OF BREATH: 0
EYE ITCHING: 1
CHEST TIGHTNESS: 1
SINUS PRESSURE: 1
ABDOMINAL PAIN: 0
EYE PAIN: 0
NAUSEA: 1
WHEEZING: 0
PHOTOPHOBIA: 0
DIARRHEA: 0
EYE REDNESS: 1

## 2022-08-24 NOTE — PROGRESS NOTES
Genitourinary: Negative. Musculoskeletal:  Positive for myalgias. Skin:  Positive for rash. Allergic/Immunologic: Positive for environmental allergies. Negative for food allergies and immunocompromised state. Neurological:  Positive for headaches. Negative for dizziness, syncope and weakness. Hematological: Negative. Psychiatric/Behavioral: Negative. Prior to Visit Medications    Medication Sig Taking? Authorizing Provider   apixaban (ELIQUIS) 2.5 MG TABS tablet Take 2.5 mg by mouth 2 times daily Yes Historical Provider, MD   celecoxib (CELEBREX) 200 MG capsule Take 200 mg by mouth daily  Yes Historical Provider, MD   acetaminophen (TYLENOL) 325 MG tablet Take 975 mg by mouth every 8 hours as needed Yes Historical Provider, MD   verapamil (CALAN SR) 240 MG extended release tablet Take 1 tablet by mouth daily Yes Finn Gonzales MD   ranolazine (RANEXA) 500 MG extended release tablet Take 1 tablet by mouth 2 times daily Yes Finn Gonzales MD   atenolol (TENORMIN) 50 MG tablet TAKE 1 TABLET BY MOUTH EVERY DAY Yes Bruce Post DO   losartan (COZAAR) 100 MG tablet Take 1 tablet by mouth daily Yes Finn Gonzales MD   nitroGLYCERIN (NITROSTAT) 0.4 MG SL tablet up to max of 3 total doses. If no relief after 1 dose, call 911.  Yes ALIYAH Moon CNP   rosuvastatin (CRESTOR) 40 MG tablet TAKE 1 TABLET BY MOUTH IN THE EVENING Yes Finn Gonzales MD   levothyroxine (SYNTHROID) 75 MCG tablet TAKE 1 TABLET BY MOUTH ONE TIME A DAY Yes Luz Post DO   ferrous sulfate 325 (65 Fe) MG tablet Take 325 mg by mouth daily (with breakfast) Yes Historical Provider, MD   vitamin D3 (CHOLECALCIFEROL) 400 units TABS tablet Take 2 tablets by mouth daily  Patient taking differently: Take 800 Units by mouth daily 1 tab daily 2/11/22 Yes ALIYAH Barba CNP   Ascorbic Acid (VITAMIN C) 500 MG tablet Take 500 mg by mouth daily Yes Historical Provider, MD   aspirin 81 MG tablet Take 81 mg by mouth daily Yes Historical Provider, MD   therapeutic multivitamin-minerals (THERAGRAN-M) tablet Take 1 tablet by mouth daily. Yes Historical Provider, MD   famotidine (PEPCID) 20 MG tablet Take 20 mg by mouth 2 times daily   Patient not taking: Reported on 2022  Historical Provider, MD   senna-docusate (Judith Fruits) 8.6-50 MG per tablet Take 1 tablet by mouth in the morning and at bedtime  Patient not taking: Reported on 2022  Historical Provider, MD   ondansetron (ZOFRAN ODT) 4 MG disintegrating tablet Take 1 tablet by mouth every 8 hours as needed for Nausea  Patient not taking: Reported on 2022  ALIYAH Oconnell CNP   sulindac (CLINORIL) 200 MG tablet TAKE 1 TABLET BY MOUTH TWO TIMES A DAY  Patient not taking: Reported on 2022  Tera Campbell MD   ibuprofen (ADVIL;MOTRIN) 800 MG tablet Take 1 tablet by mouth every 8 hours as needed for Pain or Fever  Patient not taking: Reported on 2022  ALIYAH Au CNP       Social History     Tobacco Use    Smoking status: Former     Packs/day: 0.50     Years: 10.00     Pack years: 5.00     Types: Cigarettes     Quit date: 2016     Years since quittin.8    Smokeless tobacco: Never   Vaping Use    Vaping Use: Never used   Substance Use Topics    Alcohol use: Yes     Comment: occasional    Drug use: No        Allergies   Allergen Reactions    Olmesartan    ,   Past Medical History:   Diagnosis Date    CAD (coronary artery disease)     GERD (gastroesophageal reflux disease) 2018    Heart attack ([facility] [address] 2016    Hyperlipidemia 2016    Hypertension     Obesity (BMI 30-39. 9) 2017   ,   Past Surgical History:   Procedure Laterality Date    APPENDECTOMY      CORONARY ANGIOPLASTY WITH STENT PLACEMENT      KNEE ARTHROSCOPY      KNEE ARTHROSCOPY Left 2019    medial menisectomy    KNEE ARTHROSCOPY Left 2019    LEFT KNEE VIDEO ARTHROSCOPY, PARTIAL MEDIAL MENISCECTOMY, CHONDROPLASTY performed by Keshav Maynard,  at [address]      neck    TONSILLECTOMY      TOTAL KNEE ARTHROPLASTY  2022   ,   Social History     Tobacco Use    Smoking status: Former     Packs/day: 0.50     Years: 10.00     Pack years: 5.00     Types: Cigarettes     Quit date: 2016     Years since quittin.8    Smokeless tobacco: Never   Vaping Use    Vaping Use: Never used   Substance Use Topics    Alcohol use: Yes     Comment: occasional    Drug use: No       PHYSICAL EXAMINATION:  Patient-Reported Vitals 2022   Patient-Reported Weight 230lb   Patient-Reported Height 5f6         Physical Exam  Constitutional:       General: She is not in acute distress. Appearance: Normal appearance. She is ill-appearing. She is not diaphoretic. HENT:      Head: Normocephalic and atraumatic. Right Ear: External ear normal.      Left Ear: External ear normal.      Nose: Nose normal. No rhinorrhea. Mouth/Throat:      Pharynx: Oropharynx is clear. Eyes:      General:         Right eye: No discharge. Left eye: No discharge. Conjunctiva/sclera: Conjunctivae normal.   Neck:      Trachea: Phonation normal.   Pulmonary:      Effort: Pulmonary effort is normal. No respiratory distress. Comments: Frequent harsh cough  Musculoskeletal:      Cervical back: Normal range of motion. Skin:     Coloration: Skin is not jaundiced or pale. Neurological:      General: No focal deficit present. Mental Status: She is alert and oriented to person, place, and time. Psychiatric:         Mood and Affect: Mood normal.         Behavior: Behavior normal.         Thought Content: Thought content normal.         Judgment: Judgment normal.         ASSESSMENT/PLAN:    ICD-10-CM    1. Flu-like symptoms  R68.89 promethazine-dextromethorphan (PROMETHAZINE-DM) 6.25-15 MG/5ML syrup     COVID-19      2.  Rash and other nonspecific skin eruption  R21 clotrimazole-betamethasone (LOTRISONE) 1-0.05 % cream          Concern for COVID, COVID PCR ordered  Push fluids  Continue supportive care  Promethazine DM for cough  Recommended quarantine until she gets the results  Rash, likely intertrigo/yeast, start Lotrisone  If COVID PCR is negative we will likely send antibiotics, covid positive would benefit from molnupiravir due to current medications    Orders Placed This Encounter   Medications    promethazine-dextromethorphan (PROMETHAZINE-DM) 6.25-15 MG/5ML syrup     Sig: Take 5 mLs by mouth 4 times daily as needed for Cough     Dispense:  240 mL     Refill:  0    clotrimazole-betamethasone (LOTRISONE) 1-0.05 % cream     Sig: Apply topically 2 times daily. Dispense:  45 g     Refill:  1     Orders Placed This Encounter   Procedures    COVID-19     Standing Status:   Future     Standing Expiration Date:   8/24/2023     Scheduling Instructions:      1) Due to current limited availability of the COVID-19 test, tests will be prioritized based on responses to questions above. Testing may be delayed due to volume. 2) Print and instruct patient to adhere to CDC home isolation program. (Link Above)              3) Set up or refer patient for a monitoring program.              4) Have patient sign up for and leverage MyChart (if not previously done). Order Specific Question:   Is this test for diagnosis or screening? Answer:   Diagnosis of ill patient     Order Specific Question:   Symptomatic for COVID-19 as defined by CDC? Answer:   Yes     Order Specific Question:   Date of Symptom Onset     Answer:   8/23/2022     Order Specific Question:   Hospitalized for COVID-19? Answer:   No     Order Specific Question:   Admitted to ICU for COVID-19? Answer:   No     Order Specific Question:   Employed in healthcare setting? Answer:   No     Order Specific Question:   Resident in a congregate (group) care setting?      Answer:   No     Order Specific Question:   Pregnant? Answer:   No     Order Specific Question:   Previously tested for COVID-19? Answer:   No         Return if symptoms worsen or fail to improve. Meg Adair is a 48 y.o. female  was evaluated through a synchronous (real-time) audio-video encounter. The patient (or guardian if applicable) is aware that this is a billable service, which includes applicable co-pays. This Virtual Visit was conducted with patient's (and/or legal guardian's) consent. The visit was conducted pursuant to the emergency declaration under the [address] [place] [address], [address] [place] [address] authority and the [employer] Act. Patient identification was verified, and a caregiver was present when appropriate. The patient was located in a state where the provider was licensed to provide care. Patient identification was verified at the start of the visit: Yes    Total time spent on this encounter: Not billed by time    Services were provided through a video synchronous discussion virtually to substitute for in-person clinic visit. Patient and provider were located at their individual homes. --ALIYAH Portillo CNP on 8/24/2022 at 7:46 AM    An electronic signature was used to authenticate this note.

## 2022-08-25 DIAGNOSIS — U07.1 COVID-19: Primary | ICD-10-CM

## 2022-08-25 LAB — SARS-COV-2: DETECTED

## 2022-08-31 DIAGNOSIS — I10 ESSENTIAL HYPERTENSION: Chronic | ICD-10-CM

## 2022-08-31 RX ORDER — ATENOLOL 50 MG/1
TABLET ORAL
Qty: 30 TABLET | Refills: 1 | Status: SHIPPED | OUTPATIENT
Start: 2022-08-31 | End: 2022-11-14

## 2022-10-07 ENCOUNTER — TELEPHONE (OUTPATIENT)
Dept: CARDIOLOGY CLINIC | Age: 47
End: 2022-10-07

## 2022-10-07 NOTE — LETTER
[address] Cardiology - [address] [facility] [[email or web address]] [address] [facility]  Phone: [phone]  Fax: [phone]    Reina Henry MD      Cardiac Clearance Letter  October 10, 2022    [address]  [place] [id number]  1975      Patient is cleared from a cardiac standpoint for upcoming knee surgery. Ok to hold ASA. Cardiology does not prescribe Eliquis, please obtain clearance from that blood thinner from prescribing provider. If you have any questions or concerns, please don't hesitate to call.     Sincerely,        Reina Henry MD

## 2022-10-07 NOTE — TELEPHONE ENCOUNTER
CARDIAC CLEARANCE REQUEST    What type of procedure are you having:  Knee surgery  Are you taking any blood thinners:  Asprin, Eliquis  Type on anesthesia:    When is your procedure scheduled for:  11/07/2022  What physician is performing your procedure:  Dr. Tonie Nieto with   Phone Number:  [phone]  Fax number to send the letter:      Last ov 04/29/2022 [facility]. Upcoming ov 10/28/2022 [facility].

## 2022-10-07 NOTE — TELEPHONE ENCOUNTER
According to our records, she is no longer taking Eliquis  Regardless, we did not prescribe and therefor would not give recommendations

## 2022-10-27 LAB
AVERAGE GLUCOSE: NORMAL
HBA1C MFR BLD: 6.1 %

## 2022-11-03 ENCOUNTER — TELEPHONE (OUTPATIENT)
Dept: CARDIOLOGY | Age: 47
End: 2022-11-03

## 2022-11-03 ENCOUNTER — OFFICE VISIT (OUTPATIENT)
Dept: FAMILY MEDICINE CLINIC | Age: 47
End: 2022-11-03
Payer: COMMERCIAL

## 2022-11-03 ENCOUNTER — TELEPHONE (OUTPATIENT)
Dept: CARDIOLOGY CLINIC | Age: 47
End: 2022-11-03

## 2022-11-03 VITALS
DIASTOLIC BLOOD PRESSURE: 70 MMHG | OXYGEN SATURATION: 98 % | WEIGHT: 231 LBS | HEIGHT: 66 IN | BODY MASS INDEX: 37.12 KG/M2 | TEMPERATURE: 96.9 F | SYSTOLIC BLOOD PRESSURE: 142 MMHG | HEART RATE: 68 BPM

## 2022-11-03 DIAGNOSIS — I49.9 IRREGULAR HEARTBEAT: ICD-10-CM

## 2022-11-03 DIAGNOSIS — Z01.818 PREOP EXAMINATION: Primary | ICD-10-CM

## 2022-11-03 DIAGNOSIS — I49.3 FREQUENT PVCS: ICD-10-CM

## 2022-11-03 DIAGNOSIS — I49.3 FREQUENT PVCS: Primary | ICD-10-CM

## 2022-11-03 DIAGNOSIS — E78.5 HYPERLIPIDEMIA, UNSPECIFIED HYPERLIPIDEMIA TYPE: Chronic | ICD-10-CM

## 2022-11-03 LAB
ANION GAP SERPL CALCULATED.3IONS-SCNC: 14 MMOL/L (ref 3–16)
BUN BLDV-MCNC: 11 MG/DL (ref 7–20)
CALCIUM SERPL-MCNC: 9.5 MG/DL (ref 8.3–10.6)
CHLORIDE BLD-SCNC: 102 MMOL/L (ref 99–110)
CHOLESTEROL, TOTAL: 193 MG/DL (ref 0–199)
CO2: 25 MMOL/L (ref 21–32)
CREAT SERPL-MCNC: 0.7 MG/DL (ref 0.6–1.1)
GFR SERPL CREATININE-BSD FRML MDRD: >60 ML/MIN/{1.73_M2}
GLUCOSE BLD-MCNC: 105 MG/DL (ref 70–99)
HDLC SERPL-MCNC: 41 MG/DL (ref 40–60)
LDL CHOLESTEROL CALCULATED: ABNORMAL MG/DL
LDL CHOLESTEROL DIRECT: 104 MG/DL
MAGNESIUM: 1.9 MG/DL (ref 1.8–2.4)
POTASSIUM SERPL-SCNC: 4.2 MMOL/L (ref 3.5–5.1)
SODIUM BLD-SCNC: 141 MMOL/L (ref 136–145)
TRIGL SERPL-MCNC: 340 MG/DL (ref 0–150)
VLDLC SERPL CALC-MCNC: ABNORMAL MG/DL

## 2022-11-03 PROCEDURE — 93000 ELECTROCARDIOGRAM COMPLETE: CPT | Performed by: FAMILY MEDICINE

## 2022-11-03 PROCEDURE — 99214 OFFICE O/P EST MOD 30 MIN: CPT | Performed by: FAMILY MEDICINE

## 2022-11-03 PROCEDURE — 3074F SYST BP LT 130 MM HG: CPT | Performed by: FAMILY MEDICINE

## 2022-11-03 PROCEDURE — 3078F DIAST BP <80 MM HG: CPT | Performed by: FAMILY MEDICINE

## 2022-11-03 ASSESSMENT — ENCOUNTER SYMPTOMS
COUGH: 0
BACK PAIN: 0
CHOKING: 0
CHEST TIGHTNESS: 0
STRIDOR: 0
ABDOMINAL PAIN: 0
SHORTNESS OF BREATH: 0
WHEEZING: 0

## 2022-11-03 NOTE — TELEPHONE ENCOUNTER
Lilli Gross 4 minutes ago (2:08 PM)     [address]  Pt returned call. Message given. V/u. Pt is scheduled for event monitor for 11/04/2022. Pt was offered [facility] next available for 12/08 and 12/09. Pt wanted in as soon as possible, pt is currently scheduled with nplr for 11/29.

## 2022-11-03 NOTE — TELEPHONE ENCOUNTER
Pt returned call. Message given. V/u. Pt is scheduled for event monitor for 11/04/2022. Pt was offered [facility] next available for 12/08 and 12/09. Pt wanted in as soon as possible, pt is currently scheduled with nplr for 11/29.

## 2022-11-03 NOTE — TELEPHONE ENCOUNTER
Received call today from PCP Dr. Bhanu Riojas. Patient was in his office for preoperative appointment for upcoming knee surgery Monday. She was found to be in bigeminy by EKG. No complaints of chest pain or increasing shortness of breath. Patient was complaining of palpitations however. She has notable history of prior history of coronary disease with LAD PCI, PVCs noted during echo February of this past year, scar by prior stress testing, concern for compliance with statin, atypical chest pain per prior OV April. Given the above and present symptoms with frequent PVCs, recommended we put off elective surgery for the moment. As discussed with PCP, will do the followin. Arrange cardiac monitor study, made to bring in for appointment to have placed or mail, [employer] x2 weeks to quantify PVCs  2. Basic metabolic profile and magnesium levels  3. Arrange follow-up with [facility] for reassessment, consideration of repeat stress testing      Tammie, can we call the patient and arrange the above?      Thien Bustos MD, [address] [facility]  [phone] [facility]  [phone] [address] [place] [address]

## 2022-11-03 NOTE — TELEPHONE ENCOUNTER
Dr. [facility] office stated that pt was in on 11/3/2022 for a preop visit. Dr. Gaye Zacarias did clear pt for surgery for 10/07/2022 with Dr. Ian Crowley for right total knee surgery. Dr. Gaye Zacarias would like [facility] to look over his office not and ekg from 11/03/200. Note and ekg are in chart. Dr. Gaye Zacarias noticed that the pt was having irregular heart beats and would like [facility] to review by surgery on 11/07/2022. Please advise.

## 2022-11-03 NOTE — PROGRESS NOTES
Subjective:      Patient ID: Gopal Metzger is a 48 y.o. female. JUDE Hi is here for preop exam prior to knee surgery. Her pulse was irregular and her EKG showed ventricular bigeminy. She has already received cardiac clearance from her cardiologist Dr. Félix Gonzalez. I am going to call Dr. Félix Gonzalez today and let him know that at today's exam she had ventricular bigeminy and ask for his recommendations. Review of Systems   Constitutional:  Negative for activity change, appetite change, chills, diaphoresis, fatigue, fever and unexpected weight change. Respiratory:  Negative for cough, choking, chest tightness, shortness of breath, wheezing and stridor. Cardiovascular:  Negative for chest pain, palpitations and leg swelling. Gastrointestinal:  Negative for abdominal pain. Genitourinary:  Negative for difficulty urinating. Musculoskeletal:  Negative for arthralgias and back pain. Skin:  Negative for rash. Neurological:  Negative for dizziness. Objective:   Physical Exam  Vitals and nursing note reviewed. Constitutional:       Appearance: She is well-developed. HENT:      Head: Normocephalic and atraumatic. Right Ear: External ear normal.      Left Ear: External ear normal.      Nose: Nose normal.   Eyes:      Conjunctiva/sclera: Conjunctivae normal.      Pupils: Pupils are equal, round, and reactive to light. Neck:      Thyroid: No thyromegaly. Vascular: No JVD. Trachea: No tracheal deviation. Cardiovascular:      Rate and Rhythm: Normal rate and regular rhythm. Heart sounds: Normal heart sounds. No murmur heard. No friction rub. No gallop. Pulmonary:      Effort: Pulmonary effort is normal. No respiratory distress. Breath sounds: Normal breath sounds. No stridor. No wheezing or rales. Chest:      Chest wall: No tenderness. Abdominal:      General: Bowel sounds are normal. There is no distension. Palpations: Abdomen is soft. There is no mass. Tenderness: There is no abdominal tenderness. There is no guarding or rebound. Musculoskeletal:         General: No tenderness. Normal range of motion. Cervical back: Normal range of motion and neck supple. Lymphadenopathy:      Cervical: No cervical adenopathy. Skin:     General: Skin is warm and dry. Coloration: Skin is not pale. Findings: No erythema or rash. Neurological:      Mental Status: She is alert and oriented to person, place, and time. Cranial Nerves: No cranial nerve deficit. Motor: No abnormal muscle tone. Coordination: Coordination normal.      Deep Tendon Reflexes: Reflexes are normal and symmetric. Reflexes normal.       Assessment / Plan:       1. Irregular heartbeat-EKG shows ventricular bigeminy. I talked to Dr. Bry Tucker, cardiologist and he felt that we should cancel her surgery and do some cardiac work-up including monitoring and perhaps a stress test.  I notified Jaylin Cindy and also the orthopedic surgery department at [facility] that the proposed surgery should be canceled. - EKG 12 Lead    2. Preop examination-Sussy is cleared for the proposed procedure. She was examined by her cardiologist Dr. Isrrael Guo today, November 18 2022 and he gave her cardiac clearance.

## 2022-11-03 NOTE — TELEPHONE ENCOUNTER
Received call today from PCP Dr. Jonathan Swift. Patient was in his office for preoperative appointment for upcoming knee surgery Monday. She was found to be in bigeminy by EKG. No complaints of chest pain or increasing shortness of breath. Patient was complaining of palpitations however. She has notable history of prior history of coronary disease with LAD PCI, PVCs noted during echo February of this past year, scar by prior stress testing, concern for compliance with statin, atypical chest pain per prior OV April. Given the above and present symptoms with frequent PVCs, recommended we put off elective surgery for the moment. As discussed with PCP, will do the followin. Arrange cardiac monitor study, made to bring in for appointment to have placed or mail, [facility] x2 weeks to quantify PVCs  2. Basic metabolic profile and magnesium levels  3. Arrange follow-up with [facility] for reassessment, consideration of repeat stress testing        Tammie, can we call the patient and arrange the above? Called patient an left message for her to call back to have testing set up. Please schedule her to come in for a 2 week monitor. Lab work has been ordered. Scheduled follow up with [facility].

## 2022-11-04 ENCOUNTER — NURSE ONLY (OUTPATIENT)
Dept: CARDIOLOGY CLINIC | Age: 47
End: 2022-11-04

## 2022-11-04 NOTE — PROGRESS NOTES
Monitor placed by Hanny Pfeiffer, RN  Monitor company [employer]  Length of monitor 2 weeks  Monitor ordered by [facility]  Activation successful prior to pt leaving office?  Yes

## 2022-11-07 ENCOUNTER — TELEPHONE (OUTPATIENT)
Dept: CARDIOLOGY CLINIC | Age: 47
End: 2022-11-07

## 2022-11-07 NOTE — TELEPHONE ENCOUNTER
----- Message from Karla Malhotra MD sent at 11/7/2022 11:08 AM EST -----  Pt need seen by doc prior to surgery  Cancel OV w/ Corinna Rankin  Sched OV w/ me - next available, no add-on (note pt no show for OV on 10/28/22)

## 2022-11-07 NOTE — TELEPHONE ENCOUNTER
- please call patient and let her know that she will pao to see [address], not [facility] prior to surgery. Cancel OV w/ Sukh Bazzi.  [place] [facility], no add-on (note pt no show for OV on 10/28/22)

## 2022-11-07 NOTE — TELEPHONE ENCOUNTER
Spoke with pt informed her [facility]'s response. Pt is scheduled with mg at [facility] office for 12/9/22 at 915a.  Informed pt she is also on cancellation list. (The appt has to be after pt's monitor has been completed which is 11/18/22)

## 2022-11-10 ENCOUNTER — TELEPHONE (OUTPATIENT)
Dept: FAMILY MEDICINE CLINIC | Age: 47
End: 2022-11-10

## 2022-11-10 NOTE — TELEPHONE ENCOUNTER
Patient had a pre-op exam with Dr. Huyen Davey on 11/03. She reports there was an abnormality on her EKG that required her to have cardiac clearance. She is asking if once she gets that clearance at her 11/18/22 appt with cardiology will she need to be seen for another pre-op with Dr. Huyen Davey. I was advised that Dr. Huyen Davey will addend the pre-op notes from 11/03 and she will not need another pre-op appt. Patient advised.

## 2022-11-13 DIAGNOSIS — I10 ESSENTIAL HYPERTENSION: Chronic | ICD-10-CM

## 2022-11-13 PROCEDURE — 93227 XTRNL ECG REC<48 HR R&I: CPT | Performed by: INTERNAL MEDICINE

## 2022-11-14 RX ORDER — ATENOLOL 50 MG/1
TABLET ORAL
Qty: 30 TABLET | Refills: 0 | Status: SHIPPED | OUTPATIENT
Start: 2022-11-14

## 2022-11-14 NOTE — TELEPHONE ENCOUNTER
Last ov 11/03/2022   Future Appointments   Date Time Provider Hari Chowdhury   11/18/2022  7:30 AM [place] MD I [facility]

## 2022-11-17 NOTE — PROGRESS NOTES
[facility]   Cardiac Consultation    Referring Provider:  Richard Bay DO     Chief Complaint   Patient presents with    Follow-up    Cardiac Clearance    Hypertension    Coronary Artery Disease    Hyperlipidemia        Fede Lin   1975    History of Present Illness:   Fede Lin is a 48 y.o. female who is here today for preoperative cardiac risk assessment for knee replacement. She has a past medical history of history of coronary artery disease, hypertension, and hyperlipidemia. Patient had a left heart cath in 2016 which showed patient LAD stent. Normal stress echocardiogram in 2017. Stress test 2018 showed small, moderate intensity, fixed apical defect with hypokinesis, consistent with infarct. There is a small, mild intensity, fixed inferolateral defect consistent with  diaphragmatic attenuation artifact. Repeat stress test 2/27/2021 showed no ischemia or scar. Most recent echocardiogram from 6/6/2018 showed an EF of 55%. At her last visit we stopped losartan and started Benicar (Olmesartan) 40 mg daily. She presented to the hospital on 2/22/2022 due to chest pain. An echocardiogram on 2/23/2022 had to be stopped due to patient having chest pain. Rapid response was called. Test resumed, patient had frequent PVC's after testing was restarted. Left heart cath showed patent LAD stent. Today she states she feels slightly better since she left the hospital. She missed a day of her Olmesartan and noted that her chest pain decreased. Last visit Olmesartan  stopped, Losartan restarted, Valsartan started. 4/29/22 she stated she had been feeling well since she left the hospital. No further episodes of chest pain. Blood pressure at home runs as high as 140/80's. She was tolerating her medications and was taking them as prescribed. Denied chest pain, shortness of breath, edema, dizziness, palpitations and syncope.    Today she presents for cardiac clearance and states that she has been doing well. She states that she has not been having any problems. She is going to have knee surgery. She states that the increase in verapamil had helped with the palpitations. She has increased the palpitations w/ stress, unchanged. Was noted to have many PVCs during pre-op radha; w/ PCP but was under a lot of stress at work and home at the time. Now all improved and back to baseline. She denies chest pain, sob, dizziness, syncope and edema. Past Medical History:   has a past medical history of CAD (coronary artery disease), GERD (gastroesophageal reflux disease), Heart attack ([facility] [address] Hyperlipidemia, Hypertension, and Obesity (BMI 30-39.9). Surgical History:   has a past surgical history that includes Spine surgery; Appendectomy; Tonsillectomy; Knee arthroscopy; Leg Surgery; Neck surgery; Coronary angioplasty with stent; Knee arthroscopy (Left, 03/06/2019); Knee arthroscopy (Left, 03/06/2019); and Total knee arthroplasty (06/20/2022). Social History:   reports that she quit smoking about 6 years ago. Her smoking use included cigarettes. She has a 5.00 pack-year smoking history. She has never used smokeless tobacco. She reports current alcohol use. She reports that she does not use drugs. Family History:  family history includes Heart Disease in her mother; Heart Disease (age of onset: 54) in her father; High Blood Pressure in her father and mother. Home Medications:  Prior to Admission medications    Medication Sig Start Date End Date Taking?  Authorizing Provider   atenolol (TENORMIN) 50 MG tablet TAKE 1 TABLET BY MOUTH EVERY DAY 11/14/22  Yes Morteza Post, DO   ranolazine (RANEXA) 500 MG extended release tablet Take 500 mg by mouth every morning   Yes Historical Provider, MD   acetaminophen (TYLENOL) 325 MG tablet Take 975 mg by mouth every 8 hours as needed 6/21/22  Yes Historical Provider, MD   senna-docusate (PERICOLACE) 8.6-50 MG per tablet Take 1 tablet by mouth in the morning and at bedtime Yes Historical Provider, MD   verapamil (CALAN SR) 240 MG extended release tablet Take 1 tablet by mouth daily 4/29/22  Yes Kobi Acuña MD   ranolazine (RANEXA) 500 MG extended release tablet Take 1 tablet by mouth 2 times daily 4/29/22  Yes Kobi Acuña MD   sulindac (CLINORIL) 200 MG tablet TAKE 1 TABLET BY MOUTH TWO TIMES A DAY 4/11/22  Yes Nicole Ng MD   losartan (COZAAR) 100 MG tablet Take 1 tablet by mouth daily 3/11/22  Yes Kobi Acuña MD   nitroGLYCERIN (NITROSTAT) 0.4 MG SL tablet up to max of 3 total doses. If no relief after 1 dose, call 911. 2/24/22  Yes ALIYAH Cullen CNP   rosuvastatin (CRESTOR) 40 MG tablet TAKE 1 TABLET BY MOUTH IN THE EVENING 2/11/22  Yes Kobi Acuña MD   levothyroxine (SYNTHROID) 75 MCG tablet TAKE 1 TABLET BY MOUTH ONE TIME A DAY 1/10/22  Yes Simonne Osler J Ward, DO   ferrous sulfate 325 (65 Fe) MG tablet Take 325 mg by mouth daily (with breakfast)   Yes Historical Provider, MD   vitamin D3 (CHOLECALCIFEROL) 400 units TABS tablet Take 2 tablets by mouth daily  Patient taking differently: Take 800 Units by mouth daily 1 tab daily 2/11/22 8/14/17  Yes ALIYAH Gorman CNP   Ascorbic Acid (VITAMIN C) 500 MG tablet Take 500 mg by mouth daily   Yes Historical Provider, MD   aspirin 81 MG tablet Take 81 mg by mouth daily   Yes Historical Provider, MD   therapeutic multivitamin-minerals (THERAGRAN-M) tablet Take 1 tablet by mouth daily. Yes Historical Provider, MD   promethazine-dextromethorphan (PROMETHAZINE-DM) 6.25-15 MG/5ML syrup Take 5 mLs by mouth 4 times daily as needed for Cough  Patient not taking: No sig reported 8/24/22   ALIYAH Millan Mt, CNP   clotrimazole-betamethasone (LOTRISONE) 1-0.05 % cream Apply topically 2 times daily.   Patient not taking: No sig reported 8/24/22   ALIYAH Millan Mt, CNP   celecoxib (CELEBREX) 200 MG capsule Take 200 mg by mouth daily   Patient not taking: No sig reported 6/21/22   Historical Provider, MD   famotidine (PEPCID) 20 MG tablet Take 20 mg by mouth 2 times daily   Patient not taking: No sig reported 6/21/22   Historical Provider, MD   ondansetron (ZOFRAN ODT) 4 MG disintegrating tablet Take 1 tablet by mouth every 8 hours as needed for Nausea  Patient not taking: No sig reported 6/23/22   ALIYAH Hollis CNP   ibuprofen (ADVIL;MOTRIN) 800 MG tablet Take 1 tablet by mouth every 8 hours as needed for Pain or Fever  Patient not taking: No sig reported 10/17/18   ALIYAH Ruiz CNP        Allergies:  Atorvastatin and Olmesartan     Review of Systems:   Constitutional: there has been no unanticipated weight loss. There's been no change in energy level, sleep pattern, or activity level. Eyes: No visual changes or diplopia. No scleral icterus. ENT: No Headaches, hearing loss or vertigo. No mouth sores or sore throat. Cardiovascular: Reviewed in HPI  Respiratory: No cough or wheezing, no sputum production. No hematemesis. Gastrointestinal: No abdominal pain, appetite loss, blood in stools. No change in bowel or bladder habits. Genitourinary: No dysuria, trouble voiding, or hematuria. Musculoskeletal:  No gait disturbance, weakness or joint complaints. Integumentary: No rash or pruritis. Neurological: No headache, diplopia, change in muscle strength, numbness or tingling. No change in gait, balance, coordination, mood, affect, memory, mentation, behavior. Psychiatric: No anxiety, no depression. Endocrine: No malaise, fatigue or temperature intolerance. No excessive thirst, fluid intake, or urination. No tremor. Hematologic/Lymphatic: No abnormal bruising or bleeding, blood clots or swollen lymph nodes. Allergic/Immunologic: No nasal congestion or hives.     Physical Examination:    Vitals:    11/18/22 0729   BP: 138/80   Pulse: 67   SpO2: 98%          Constitutional and General Appearance: NAD   Respiratory:  Normal excursion and expansion without use of accessory muscles  Resp Auscultation: Normal breath sounds without dullness  Cardiovascular: The apical impulses not displaced  Heart tones are crisp and normal  Cervical veins are not engorged  The carotid upstroke is normal in amplitude and contour without delay or bruit  Normal S1S2, No S3, No Murmur  Peripheral pulses are symmetrical and full  There is no clubbing, cyanosis of the extremities. No edema  Femoral Arteries: 2+ and equal  Pedal Pulses: 2+ and equal   Abdomen:  No masses or tenderness  Liver/Spleen: No Abnormalities Noted  Neurological/Psychiatric:  Alert and oriented in all spheres  Moves all extremities well  Exhibits normal gait balance and coordination  No abnormalities of mood, affect, memory, mentation, or behavior are noted    Left heart cath 2016 [facility]  Mild CAD w/ patent LAD stent  LVEF 50-55%    Echocardiogram 11/23/2016  Summary   LV systolic function is lower normal with ejection fraction estimated at   50-55%. There is mild hypokinesis of the apex. Diastolic filling parameters suggests normal diastolic filling pressure . Mild mitral annular calcification. Trivial tricuspid regurgitation without jet adequate to estimate systolic   pulmonary artery pressure (SPAP). Echocardiogram 8/2017  Summary   Normal left ventricle systolic function with an estimated ejection fraction   of 55%. No regional wall motion abnormalities are seen. Normal left ventricular diastolic filling pressure. The left atrium is mildly dilated. Mild mitral and tricuspid regurgitation. Systolic pulmonary artery pressure (SPAP) is normal and estimated at 34 mmHg   (RA pressure 3 mmHg). Last echo on 11/23/2016 showed EF 50-55%. Stress echocardiogram 2017 [facility] Conclusions     - Stress ECG conclusions: There were no stress arrhythmias or     conduction abnormalities. The stress ECG was negative for     ischemia.  Maldonado scoring: exercise time of 9.5min; maximum ST     deviation of 0mm; 60-65%. No regional   wall motion abnormalities are seen. Normal left ventricular diastolic   filling pressure. -- Normal wall motion during chest pain. -- Trace mitral and tricuspid regurgitation. Left heart cath 2/23/2022  Procedure Findings:  1. Mild non-obstructive coronary artery disease. ~patent LAD stent  2. Normal left ventricular function with EF estimated at 55-60%  3. Normal left heart hemodynamics        Assessment:   Preoperative cardiac risk assessment   Chest pain - atypical. No recent. Suspect sig non cardiac component. Also possible due to micrvascular dz. Resolved post add verapamil and Ranexa   Palpitations - decreased w/ verapamil. Due to PVCs. Increases w/ stress. stable  Coronary artery disease - stable  Mild mitral regurgitation. Hypertension - stable   Hyperlipidemia - suboptimal. likely due to not consistently taking statin recently. Will monitor     Plan:  Okay to proceed with knee surgery  Stay on aspirin and all other cardiac medications. Cardiac medications reviewed including indications and pertinent side effects. Medication list updated at this visit. Check blood pressure and heart rate at home a few times per week- keep a log with dates and times and bring to office visit   Regular exercise and following a healthy diet encouraged   Follow up with me in 1 year      Thank you for allowing me to participate in the care of this individual.    Scribe's attestation: This note was scribed in the presence of Dr. Jolly Ware MD by Iftikhar Velasquez LPN      The scribes documentation has been prepared under my direction and personally reviewed by me in its entirety. I confirm that the note above accurately reflects all work, treatment, procedures, and medical decision making performed by me. Dr. Jolly Ware MD    [facility].  Juan Carlos Joya M.D., Rosa Tolbert

## 2022-11-18 ENCOUNTER — OFFICE VISIT (OUTPATIENT)
Dept: CARDIOLOGY CLINIC | Age: 47
End: 2022-11-18
Payer: COMMERCIAL

## 2022-11-18 ENCOUNTER — TELEPHONE (OUTPATIENT)
Dept: FAMILY MEDICINE CLINIC | Age: 47
End: 2022-11-18

## 2022-11-18 VITALS
HEART RATE: 67 BPM | BODY MASS INDEX: 38.15 KG/M2 | DIASTOLIC BLOOD PRESSURE: 80 MMHG | SYSTOLIC BLOOD PRESSURE: 138 MMHG | HEIGHT: 66 IN | OXYGEN SATURATION: 98 % | WEIGHT: 237.4 LBS

## 2022-11-18 DIAGNOSIS — I25.10 CORONARY ARTERY DISEASE INVOLVING NATIVE CORONARY ARTERY OF NATIVE HEART WITHOUT ANGINA PECTORIS: Primary | ICD-10-CM

## 2022-11-18 PROCEDURE — 3074F SYST BP LT 130 MM HG: CPT | Performed by: INTERNAL MEDICINE

## 2022-11-18 PROCEDURE — 99214 OFFICE O/P EST MOD 30 MIN: CPT | Performed by: INTERNAL MEDICINE

## 2022-11-18 PROCEDURE — 3078F DIAST BP <80 MM HG: CPT | Performed by: INTERNAL MEDICINE

## 2022-11-18 NOTE — TELEPHONE ENCOUNTER
Patient stopped at desk after her appt with cardiologist.  She is cleared for surgery.   Please update her pre-op and fax     Dr. Fabian Falling with [facility]

## 2022-11-18 NOTE — LETTER
The [address] [place] [address], [address] [place] [address], [address] [place] [address]  U:[phone]  F: [phone]      November 18, 2022      To whom it may concern:    Kamini Carlisle 1975 is at low to intermediate cardiac risk and may proceed with upcoming planned knee procedure/surgery. If you have any questions, please do not hesitate to call the office.      Thank you,       Dr. Nikunj Marion MD

## 2022-11-18 NOTE — PATIENT INSTRUCTIONS
Plan:  Okay to proceed with knee surgery  Stay on aspirin and all other cardiac medications. Cardiac medications reviewed including indications and pertinent side effects. Medication list updated at this visit.    Check blood pressure and heart rate at home a few times per week- keep a log with dates and times and bring to office visit   Regular exercise and following a healthy diet encouraged   Follow up with me in 1 year

## 2022-11-21 DIAGNOSIS — I49.3 FREQUENT PVCS: ICD-10-CM

## 2022-11-22 ENCOUNTER — TELEPHONE (OUTPATIENT)
Dept: CARDIOLOGY CLINIC | Age: 47
End: 2022-11-22

## 2022-11-22 NOTE — TELEPHONE ENCOUNTER
Created telephone encounter. Spoke with Sussy relayed message per [address] regarding monitor results. Pt verbalized understanding.

## 2022-11-22 NOTE — TELEPHONE ENCOUNTER
----- Message from Sriram Guaman MD sent at 11/21/2022  4:17 PM EST -----  Please notify patient that their monitor shows PVCs as we expected.   No change to tx plan

## 2022-11-29 DIAGNOSIS — R00.2 PALPITATION: Primary | ICD-10-CM

## 2022-11-29 PROCEDURE — 93272 ECG/REVIEW INTERPRET ONLY: CPT | Performed by: INTERNAL MEDICINE

## 2022-12-06 RX ORDER — LEVOTHYROXINE SODIUM 0.07 MG/1
TABLET ORAL
Qty: 30 TABLET | Refills: 2 | Status: SHIPPED | OUTPATIENT
Start: 2022-12-06

## 2023-01-05 DIAGNOSIS — I10 ESSENTIAL HYPERTENSION: Chronic | ICD-10-CM

## 2023-01-05 RX ORDER — ATENOLOL 50 MG/1
TABLET ORAL
Qty: 30 TABLET | Refills: 0 | Status: SHIPPED | OUTPATIENT
Start: 2023-01-05

## 2023-02-22 DIAGNOSIS — I10 ESSENTIAL HYPERTENSION: Chronic | ICD-10-CM

## 2023-02-23 RX ORDER — ATENOLOL 50 MG/1
TABLET ORAL
Qty: 30 TABLET | Refills: 0 | OUTPATIENT
Start: 2023-02-23

## 2023-03-10 ENCOUNTER — TELEMEDICINE (OUTPATIENT)
Dept: FAMILY MEDICINE CLINIC | Age: 48
End: 2023-03-10

## 2023-03-10 DIAGNOSIS — Z12.11 SCREENING FOR COLORECTAL CANCER: Primary | ICD-10-CM

## 2023-03-10 DIAGNOSIS — J02.9 SORE THROAT: ICD-10-CM

## 2023-03-10 DIAGNOSIS — R05.1 ACUTE COUGH: ICD-10-CM

## 2023-03-10 DIAGNOSIS — Z12.12 SCREENING FOR COLORECTAL CANCER: Primary | ICD-10-CM

## 2023-03-10 DIAGNOSIS — I10 ESSENTIAL HYPERTENSION: Chronic | ICD-10-CM

## 2023-03-10 RX ORDER — AMOXICILLIN 500 MG/1
500 CAPSULE ORAL 2 TIMES DAILY
Qty: 20 CAPSULE | Refills: 0 | Status: SHIPPED | OUTPATIENT
Start: 2023-03-10 | End: 2023-03-20

## 2023-03-10 RX ORDER — ATENOLOL 50 MG/1
TABLET ORAL
Qty: 30 TABLET | Refills: 1 | Status: SHIPPED | OUTPATIENT
Start: 2023-03-10

## 2023-03-10 RX ORDER — FLUTICASONE PROPIONATE 50 MCG
1 SPRAY, SUSPENSION (ML) NASAL DAILY
Qty: 1 EACH | Refills: 0 | Status: SHIPPED | OUTPATIENT
Start: 2023-03-10 | End: 2023-03-17

## 2023-03-10 RX ORDER — BENZONATATE 100 MG/1
100 CAPSULE ORAL 3 TIMES DAILY PRN
Qty: 30 CAPSULE | Refills: 1 | Status: SHIPPED | OUTPATIENT
Start: 2023-03-10 | End: 2023-03-20

## 2023-03-10 SDOH — ECONOMIC STABILITY: FOOD INSECURITY: WITHIN THE PAST 12 MONTHS, YOU WORRIED THAT YOUR FOOD WOULD RUN OUT BEFORE YOU GOT MONEY TO BUY MORE.: NEVER TRUE

## 2023-03-10 SDOH — ECONOMIC STABILITY: INCOME INSECURITY: HOW HARD IS IT FOR YOU TO PAY FOR THE VERY BASICS LIKE FOOD, HOUSING, MEDICAL CARE, AND HEATING?: NOT HARD AT ALL

## 2023-03-10 SDOH — ECONOMIC STABILITY: TRANSPORTATION INSECURITY
IN THE PAST 12 MONTHS, HAS LACK OF TRANSPORTATION KEPT YOU FROM MEETINGS, WORK, OR FROM GETTING THINGS NEEDED FOR DAILY LIVING?: NO

## 2023-03-10 SDOH — ECONOMIC STABILITY: FOOD INSECURITY: WITHIN THE PAST 12 MONTHS, THE FOOD YOU BOUGHT JUST DIDN'T LAST AND YOU DIDN'T HAVE MONEY TO GET MORE.: NEVER TRUE

## 2023-03-10 SDOH — ECONOMIC STABILITY: HOUSING INSECURITY
IN THE LAST 12 MONTHS, WAS THERE A TIME WHEN YOU DID NOT HAVE A STEADY PLACE TO SLEEP OR SLEPT IN A SHELTER (INCLUDING NOW)?: NO

## 2023-03-10 ASSESSMENT — ANXIETY QUESTIONNAIRES
4. TROUBLE RELAXING: 0
2. NOT BEING ABLE TO STOP OR CONTROL WORRYING: 0
3. WORRYING TOO MUCH ABOUT DIFFERENT THINGS: 0
7. FEELING AFRAID AS IF SOMETHING AWFUL MIGHT HAPPEN: 0
IF YOU CHECKED OFF ANY PROBLEMS ON THIS QUESTIONNAIRE, HOW DIFFICULT HAVE THESE PROBLEMS MADE IT FOR YOU TO DO YOUR WORK, TAKE CARE OF THINGS AT HOME, OR GET ALONG WITH OTHER PEOPLE: NOT DIFFICULT AT ALL
1. FEELING NERVOUS, ANXIOUS, OR ON EDGE: 0
GAD7 TOTAL SCORE: 0
5. BEING SO RESTLESS THAT IT IS HARD TO SIT STILL: 0
6. BECOMING EASILY ANNOYED OR IRRITABLE: 0

## 2023-03-10 ASSESSMENT — PATIENT HEALTH QUESTIONNAIRE - PHQ9
SUM OF ALL RESPONSES TO PHQ9 QUESTIONS 1 & 2: 0
SUM OF ALL RESPONSES TO PHQ QUESTIONS 1-9: 0
1. LITTLE INTEREST OR PLEASURE IN DOING THINGS: 0
SUM OF ALL RESPONSES TO PHQ QUESTIONS 1-9: 0
2. FEELING DOWN, DEPRESSED OR HOPELESS: 0

## 2023-03-10 ASSESSMENT — ENCOUNTER SYMPTOMS
SORE THROAT: 1
COUGH: 1

## 2023-03-10 NOTE — TELEPHONE ENCOUNTER
Future Appointments   Date Time Provider Hari Chowdhury   3/10/2023 11:40 AM DO Lori Weiner 43     3/3/2022

## 2023-03-10 NOTE — PROGRESS NOTES
3/10/2023    TELEHEALTH EVALUATION -- Audio/Visual (During GTIVH-18 public health emergency)    HPI:    Gearline Screen (:  1975) has requested an audio/video evaluation for the following concern(s):    Chief Complaint   Patient presents with    URI     No Fever    Cough    Pharyngitis     Going on since Weds     Cough is dry. Denies plaque in throat , denies runny nose, just came back from a  in [place] and pollen was very bad. Taking Sudafed and Mucinex DM, has cruise planned for 1 week. Has not tested for COVID. Review of Systems   HENT:  Positive for sore throat. Respiratory:  Positive for cough. Prior to Visit Medications    Medication Sig Taking? Authorizing Provider   amoxicillin (AMOXIL) 500 MG capsule Take 1 capsule by mouth 2 times daily for 10 days Yes Fred Rizvi, DO   fluticasone (FLONASE) 50 MCG/ACT nasal spray 1 spray by Nasal route daily for 7 days Yes Fred Rizvi, DO   benzonatate (TESSALON PERLES) 100 MG capsule Take 1 capsule by mouth 3 times daily as needed for Cough Yes Fred Rizvi, DO   atenolol (TENORMIN) 50 MG tablet TAKE 1 TABLET BY MOUTH EVERY DAY Yes ALIYAH Martins CNP   levothyroxine (SYNTHROID) 75 MCG tablet TAKE 1 TABLET BY MOUTH EVERY DAY Yes Tarah Post, DO   acetaminophen (TYLENOL) 325 MG tablet Take 975 mg by mouth every 8 hours as needed Yes Historical Provider, MD   verapamil (CALAN SR) 240 MG extended release tablet Take 1 tablet by mouth daily Yes Orlando Morales MD   losartan (COZAAR) 100 MG tablet Take 1 tablet by mouth daily Yes Orlando Morales MD   nitroGLYCERIN (NITROSTAT) 0.4 MG SL tablet up to max of 3 total doses. If no relief after 1 dose, call 911.  Yes ALIYAH Green CNP   rosuvastatin (CRESTOR) 40 MG tablet TAKE 1 TABLET BY MOUTH IN THE EVENING Yes Orlando Morales MD   ibuprofen (ADVIL;MOTRIN) 800 MG tablet Take 1 tablet by mouth every 8 hours as needed for Pain or Fever Yes AILYAH Patel - CNP   ferrous sulfate 325 (65 Fe) MG tablet Take 325 mg by mouth daily (with breakfast) Yes Historical Provider, MD   vitamin D3 (CHOLECALCIFEROL) 400 units TABS tablet Take 2 tablets by mouth daily  Patient taking differently: Take 800 Units by mouth daily 1 tab daily 22 Yes Massiel Hansen ALIYAH Uribe - CNP   Ascorbic Acid (VITAMIN C) 500 MG tablet Take 500 mg by mouth daily Yes Historical Provider, MD   aspirin 81 MG tablet Take 81 mg by mouth daily Yes Historical Provider, MD   therapeutic multivitamin-minerals (THERAGRAN-M) tablet Take 1 tablet by mouth daily. Yes Historical Provider, MD       Social History     Tobacco Use    Smoking status: Former     Packs/day: 0.50     Years: 10.00     Pack years: 5.00     Types: Cigarettes     Quit date: 2016     Years since quittin.3    Smokeless tobacco: Never   Vaping Use    Vaping Use: Never used   Substance Use Topics    Alcohol use: Yes     Comment: occasional    Drug use: No        Allergies   Allergen Reactions    Atorvastatin Other (See Comments)     Chest pain      Olmesartan    ,   Past Medical History:   Diagnosis Date    CAD (coronary artery disease)     GERD (gastroesophageal reflux disease) 2018    Heart attack ([facility] [address] 2016    Hyperlipidemia 2016    Hypertension     Obesity (BMI 30-39. 9) 2017   ,   Past Surgical History:   Procedure Laterality Date    APPENDECTOMY      CORONARY ANGIOPLASTY WITH STENT PLACEMENT      KNEE ARTHROSCOPY      KNEE ARTHROSCOPY Left 2019    medial menisectomy    KNEE ARTHROSCOPY Left 2019    LEFT KNEE VIDEO ARTHROSCOPY, PARTIAL MEDIAL MENISCECTOMY, CHONDROPLASTY performed by Stephie Wallis DO at [address]      neck    TONSILLECTOMY      TOTAL KNEE ARTHROPLASTY  2022    TOTAL KNEE ARTHROPLASTY Right 2022       PHYSICAL EXAMINATION:  [ INSTRUCTIONS:  \"[x]\" Indicates a positive item  \"[]\" Indicates a negative item  -- DELETE ALL ITEMS NOT EXAMINED]  Vital Signs: (As obtained by patient/caregiver or practitioner observation)    Height  -   5' 6\"          Weight -    220 lb          Blood pressure-        Heart rate-     Temperature-      Constitutional: [x] Appears well-developed and well-nourished [x] No apparent distress      [] Abnormal-   Mental status  [x] Alert and awake  [x] Oriented to person/place/time [x]Able to follow commands      Eyes:  EOM    [x]  Normal  [] Abnormal-  Sclera  []  Normal  [] Abnormal -         Discharge []  None visible  [] Abnormal -    HENT:   [x] Normocephalic, atraumatic. [] Abnormal   [] Mouth/Throat: Mucous membranes are moist.     External Ears [x] Normal  [] Abnormal-     Neck: [x] No visualized mass     Pulmonary/Chest: [x] Respiratory effort normal.  [x] No visualized signs of difficulty breathing or respiratory distress        [] Abnormal-      Musculoskeletal:   [] Normal gait with no signs of ataxia         [x] Normal range of motion of neck        [] Abnormal-       Neurological:        [x] No Facial Asymmetry (Cranial nerve 7 motor function) (limited exam to video visit)          [x] No gaze palsy        [] Abnormal-         Skin:        [x] No significant exanthematous lesions or discoloration noted on facial skin         [] Abnormal-            Psychiatric:       [x] Normal Affect [] No Hallucinations        [] Abnormal-     Other pertinent observable physical exam findings-     ASSESSMENT/PLAN:   Diagnosis Orders   1. Screening for colorectal cancer  Cologuard (Fecal DNA Colorectal Cancer Screening)      2. Sore throat  amoxicillin (AMOXIL) 500 MG capsule    fluticasone (FLONASE) 50 MCG/ACT nasal spray      3.  Acute cough  amoxicillin (AMOXIL) 500 MG capsule    fluticasone (FLONASE) 50 MCG/ACT nasal spray    benzonatate (TESSALON PERLES) 100 MG capsule      Instructed pt to purchase a home COVID test and to call our office today with result and ask for Julien Acosta to call me. Use a vaporizer in bedroom at night, as well as Vicks on the chest, back, and under the nose at night. Return if symptoms worsen or fail to improve. Marilee Gonzalez, was evaluated through a synchronous (real-time) audio-video encounter. The patient (or guardian if applicable) is aware that this is a billable service. Verbal consent to proceed has been obtained within the past 12 months. The visit was conducted pursuant to the emergency declaration under the [address] waiver authority and the [employer] General Act. Patient identification was verified, and a caregiver was present when appropriate. The patient was located in a state where the provider was credentialed to provide care. Total time spent on this encounter: Not billed by time    --Michaela Wharton DO on 3/10/2023 at 12:10 PM    An electronic signature was used to authenticate this note.

## 2023-03-13 RX ORDER — ROSUVASTATIN CALCIUM 40 MG/1
TABLET, COATED ORAL
Qty: 90 TABLET | Refills: 2 | Status: SHIPPED | OUTPATIENT
Start: 2023-03-13

## 2023-05-10 RX ORDER — VERAPAMIL HYDROCHLORIDE 240 MG/1
240 TABLET, FILM COATED, EXTENDED RELEASE ORAL DAILY
Qty: 90 TABLET | Refills: 1 | Status: SHIPPED | OUTPATIENT
Start: 2023-05-10

## 2023-05-19 LAB — NONINV COLON CA DNA+OCC BLD SCRN STL QL: NEGATIVE

## 2023-06-20 RX ORDER — LOSARTAN POTASSIUM 100 MG/1
TABLET ORAL
Qty: 90 TABLET | Refills: 1 | Status: SHIPPED | OUTPATIENT
Start: 2023-06-20

## 2023-07-07 DIAGNOSIS — I10 ESSENTIAL HYPERTENSION: Chronic | ICD-10-CM

## 2023-07-07 RX ORDER — ATENOLOL 50 MG/1
TABLET ORAL
Qty: 30 TABLET | Refills: 0 | Status: SHIPPED | OUTPATIENT
Start: 2023-07-07

## 2023-07-18 DIAGNOSIS — I10 ESSENTIAL HYPERTENSION: Chronic | ICD-10-CM

## 2023-07-18 RX ORDER — ATENOLOL 50 MG/1
TABLET ORAL
Qty: 30 TABLET | Refills: 1 | Status: SHIPPED | OUTPATIENT
Start: 2023-07-18

## 2023-07-21 ENCOUNTER — TELEMEDICINE (OUTPATIENT)
Dept: FAMILY MEDICINE CLINIC | Age: 48
End: 2023-07-21
Payer: COMMERCIAL

## 2023-07-21 DIAGNOSIS — M54.2 NECK PAIN: Primary | ICD-10-CM

## 2023-07-21 PROCEDURE — 99213 OFFICE O/P EST LOW 20 MIN: CPT | Performed by: FAMILY MEDICINE

## 2023-07-21 RX ORDER — TIZANIDINE 4 MG/1
4 TABLET ORAL EVERY 8 HOURS PRN
Qty: 15 TABLET | Refills: 0 | Status: SHIPPED | OUTPATIENT
Start: 2023-07-21 | End: 2023-07-26

## 2023-07-21 RX ORDER — METHYLPREDNISOLONE 4 MG/1
TABLET ORAL
Qty: 1 KIT | Refills: 0 | Status: SHIPPED | OUTPATIENT
Start: 2023-07-21 | End: 2023-07-27

## 2023-07-21 RX ORDER — LIDOCAINE AND PRILOCAINE 25; 25 MG/G; MG/G
CREAM TOPICAL
Qty: 1 EACH | Refills: 0 | Status: CANCELLED | OUTPATIENT
Start: 2023-07-21

## 2023-07-21 ASSESSMENT — PATIENT HEALTH QUESTIONNAIRE - PHQ9
1. LITTLE INTEREST OR PLEASURE IN DOING THINGS: 0
SUM OF ALL RESPONSES TO PHQ QUESTIONS 1-9: 0
SUM OF ALL RESPONSES TO PHQ9 QUESTIONS 1 & 2: 0
2. FEELING DOWN, DEPRESSED OR HOPELESS: 0

## 2023-09-29 RX ORDER — LEVOTHYROXINE SODIUM 0.07 MG/1
TABLET ORAL
Qty: 30 TABLET | Refills: 0 | Status: SHIPPED | OUTPATIENT
Start: 2023-09-29

## 2023-12-05 ENCOUNTER — OFFICE VISIT (OUTPATIENT)
Dept: FAMILY MEDICINE CLINIC | Age: 48
End: 2023-12-05
Payer: COMMERCIAL

## 2023-12-05 VITALS
SYSTOLIC BLOOD PRESSURE: 147 MMHG | DIASTOLIC BLOOD PRESSURE: 83 MMHG | WEIGHT: 233.2 LBS | TEMPERATURE: 97.1 F | BODY MASS INDEX: 37.48 KG/M2 | HEIGHT: 66 IN | RESPIRATION RATE: 16 BRPM | HEART RATE: 53 BPM

## 2023-12-05 DIAGNOSIS — I10 PRIMARY HYPERTENSION: ICD-10-CM

## 2023-12-05 DIAGNOSIS — R51.9 ACUTE INTRACTABLE HEADACHE, UNSPECIFIED HEADACHE TYPE: ICD-10-CM

## 2023-12-05 DIAGNOSIS — E03.9 HYPOTHYROIDISM, UNSPECIFIED TYPE: ICD-10-CM

## 2023-12-05 DIAGNOSIS — I10 ESSENTIAL HYPERTENSION: ICD-10-CM

## 2023-12-05 DIAGNOSIS — R42 DIZZINESS: Primary | ICD-10-CM

## 2023-12-05 DIAGNOSIS — H53.8 BLURRED VISION, BILATERAL: ICD-10-CM

## 2023-12-05 DIAGNOSIS — E78.5 HYPERLIPIDEMIA, UNSPECIFIED HYPERLIPIDEMIA TYPE: ICD-10-CM

## 2023-12-05 PROCEDURE — 3077F SYST BP >= 140 MM HG: CPT | Performed by: FAMILY MEDICINE

## 2023-12-05 PROCEDURE — 3079F DIAST BP 80-89 MM HG: CPT | Performed by: FAMILY MEDICINE

## 2023-12-05 PROCEDURE — 99214 OFFICE O/P EST MOD 30 MIN: CPT | Performed by: FAMILY MEDICINE

## 2023-12-05 RX ORDER — MELOXICAM 15 MG/1
15 TABLET ORAL DAILY PRN
Qty: 10 TABLET | Refills: 0 | Status: SHIPPED | OUTPATIENT
Start: 2023-12-05

## 2023-12-05 RX ORDER — BLOOD PRESSURE TEST KIT
1 KIT MISCELLANEOUS
Qty: 1 KIT | Refills: 0 | Status: SHIPPED | OUTPATIENT
Start: 2023-12-05

## 2023-12-05 RX ORDER — LEVOTHYROXINE SODIUM 0.07 MG/1
75 TABLET ORAL DAILY
Qty: 90 TABLET | Refills: 0 | Status: SHIPPED | OUTPATIENT
Start: 2023-12-05

## 2023-12-05 ASSESSMENT — PATIENT HEALTH QUESTIONNAIRE - PHQ9
SUM OF ALL RESPONSES TO PHQ QUESTIONS 1-9: 0
2. FEELING DOWN, DEPRESSED OR HOPELESS: 0
SUM OF ALL RESPONSES TO PHQ QUESTIONS 1-9: 0
SUM OF ALL RESPONSES TO PHQ QUESTIONS 1-9: 0
1. LITTLE INTEREST OR PLEASURE IN DOING THINGS: 0
SUM OF ALL RESPONSES TO PHQ9 QUESTIONS 1 & 2: 0
SUM OF ALL RESPONSES TO PHQ QUESTIONS 1-9: 0

## 2023-12-05 ASSESSMENT — ENCOUNTER SYMPTOMS
COUGH: 0
RHINORRHEA: 0

## 2023-12-06 DIAGNOSIS — E78.5 HYPERLIPIDEMIA, UNSPECIFIED HYPERLIPIDEMIA TYPE: ICD-10-CM

## 2023-12-06 DIAGNOSIS — E03.9 HYPOTHYROIDISM, UNSPECIFIED TYPE: ICD-10-CM

## 2023-12-06 DIAGNOSIS — I10 PRIMARY HYPERTENSION: ICD-10-CM

## 2023-12-06 LAB
ALBUMIN SERPL-MCNC: 4.2 G/DL (ref 3.4–5)
ALBUMIN/GLOB SERPL: 1.5 {RATIO} (ref 1.1–2.2)
ALP SERPL-CCNC: 78 U/L (ref 40–129)
ALT SERPL-CCNC: 17 U/L (ref 10–40)
ANION GAP SERPL CALCULATED.3IONS-SCNC: 12 MMOL/L (ref 3–16)
AST SERPL-CCNC: 17 U/L (ref 15–37)
BASOPHILS # BLD: 0 K/UL (ref 0–0.2)
BASOPHILS NFR BLD: 0.6 %
BILIRUB SERPL-MCNC: <0.2 MG/DL (ref 0–1)
BUN SERPL-MCNC: 13 MG/DL (ref 7–20)
CALCIUM SERPL-MCNC: 9.2 MG/DL (ref 8.3–10.6)
CHLORIDE SERPL-SCNC: 101 MMOL/L (ref 99–110)
CHOLEST SERPL-MCNC: 165 MG/DL (ref 0–199)
CO2 SERPL-SCNC: 27 MMOL/L (ref 21–32)
CREAT SERPL-MCNC: 0.7 MG/DL (ref 0.6–1.1)
DEPRECATED RDW RBC AUTO: 14.3 % (ref 12.4–15.4)
EOSINOPHIL # BLD: 0.2 K/UL (ref 0–0.6)
EOSINOPHIL NFR BLD: 4.8 %
GFR SERPLBLD CREATININE-BSD FMLA CKD-EPI: >60 ML/MIN/{1.73_M2}
GLUCOSE SERPL-MCNC: 104 MG/DL (ref 70–99)
HCT VFR BLD AUTO: 35.1 % (ref 36–48)
HDLC SERPL-MCNC: 43 MG/DL (ref 40–60)
HGB BLD-MCNC: 11.9 G/DL (ref 12–16)
LDLC SERPL CALC-MCNC: 84 MG/DL
LYMPHOCYTES # BLD: 1.3 K/UL (ref 1–5.1)
LYMPHOCYTES NFR BLD: 25.6 %
MCH RBC QN AUTO: 26.7 PG (ref 26–34)
MCHC RBC AUTO-ENTMCNC: 33.8 G/DL (ref 31–36)
MCV RBC AUTO: 79.1 FL (ref 80–100)
MONOCYTES # BLD: 0.4 K/UL (ref 0–1.3)
MONOCYTES NFR BLD: 7.1 %
NEUTROPHILS # BLD: 3.2 K/UL (ref 1.7–7.7)
NEUTROPHILS NFR BLD: 61.9 %
PLATELET # BLD AUTO: 289 K/UL (ref 135–450)
PMV BLD AUTO: 9.4 FL (ref 5–10.5)
POTASSIUM SERPL-SCNC: 4.4 MMOL/L (ref 3.5–5.1)
PROT SERPL-MCNC: 7 G/DL (ref 6.4–8.2)
RBC # BLD AUTO: 4.43 M/UL (ref 4–5.2)
SODIUM SERPL-SCNC: 140 MMOL/L (ref 136–145)
T4 FREE SERPL-MCNC: 1.1 NG/DL (ref 0.9–1.8)
TRIGL SERPL-MCNC: 189 MG/DL (ref 0–150)
TSH SERPL DL<=0.005 MIU/L-ACNC: 2.59 UIU/ML (ref 0.27–4.2)
VLDLC SERPL CALC-MCNC: 38 MG/DL
WBC # BLD AUTO: 5.1 K/UL (ref 4–11)

## 2023-12-10 DIAGNOSIS — D64.9 LOW HEMOGLOBIN: Primary | ICD-10-CM

## 2023-12-10 DIAGNOSIS — R71.8 DECREASED MEAN CORPUSCULAR VOLUME: ICD-10-CM

## 2023-12-14 ENCOUNTER — HOSPITAL ENCOUNTER (OUTPATIENT)
Dept: CT IMAGING | Age: 48
Discharge: HOME | End: 2023-12-14
Attending: FAMILY MEDICINE
Payer: COMMERCIAL

## 2023-12-14 DIAGNOSIS — R51.9 ACUTE INTRACTABLE HEADACHE, UNSPECIFIED HEADACHE TYPE: ICD-10-CM

## 2023-12-14 DIAGNOSIS — H53.8 BLURRED VISION, BILATERAL: ICD-10-CM

## 2023-12-14 DIAGNOSIS — D64.9 LOW HEMOGLOBIN: ICD-10-CM

## 2023-12-14 DIAGNOSIS — R71.8 DECREASED MEAN CORPUSCULAR VOLUME: ICD-10-CM

## 2023-12-14 DIAGNOSIS — R42 DIZZINESS: ICD-10-CM

## 2023-12-14 PROCEDURE — 70450 CT HEAD/BRAIN W/O DYE: CPT

## 2023-12-15 LAB
IRON SATN MFR SERPL: 15 % (ref 15–50)
IRON SERPL-MCNC: 62 UG/DL (ref 37–145)
TIBC SERPL-MCNC: 401 UG/DL (ref 260–445)

## 2023-12-17 DIAGNOSIS — R42 DIZZINESS: Primary | ICD-10-CM

## 2023-12-17 DIAGNOSIS — R51.9 ACUTE INTRACTABLE HEADACHE, UNSPECIFIED HEADACHE TYPE: ICD-10-CM

## 2024-01-04 RX ORDER — LOSARTAN POTASSIUM 100 MG/1
TABLET ORAL
Qty: 90 TABLET | Refills: 0 | Status: SHIPPED | OUTPATIENT
Start: 2024-01-04

## 2024-01-04 NOTE — TELEPHONE ENCOUNTER
11/18/2022 [facility]  No upcoming appt  12/6/2023 cmp, lipid,cbc    Please contact pt for yearly appt/med refill appt.

## 2024-01-29 ENCOUNTER — OFFICE VISIT (OUTPATIENT)
Dept: FAMILY MEDICINE CLINIC | Age: 49
End: 2024-01-29
Payer: COMMERCIAL

## 2024-01-29 VITALS
SYSTOLIC BLOOD PRESSURE: 132 MMHG | RESPIRATION RATE: 16 BRPM | DIASTOLIC BLOOD PRESSURE: 80 MMHG | HEART RATE: 78 BPM | BODY MASS INDEX: 38.41 KG/M2 | TEMPERATURE: 97.1 F | OXYGEN SATURATION: 98 % | WEIGHT: 238 LBS

## 2024-01-29 DIAGNOSIS — R73.03 PRE-DIABETES: ICD-10-CM

## 2024-01-29 DIAGNOSIS — E66.01 MORBID OBESITY (HCC): ICD-10-CM

## 2024-01-29 DIAGNOSIS — Z12.31 ENCOUNTER FOR SCREENING MAMMOGRAM FOR MALIGNANT NEOPLASM OF BREAST: ICD-10-CM

## 2024-01-29 DIAGNOSIS — H04.123 DRY EYE SYNDROME OF BOTH EYES: ICD-10-CM

## 2024-01-29 DIAGNOSIS — G47.33 OSA (OBSTRUCTIVE SLEEP APNEA): Primary | ICD-10-CM

## 2024-01-29 DIAGNOSIS — R06.83 LOUD SNORING: ICD-10-CM

## 2024-01-29 LAB — HBA1C MFR BLD: 5.8 %

## 2024-01-29 PROCEDURE — 3075F SYST BP GE 130 - 139MM HG: CPT | Performed by: NURSE PRACTITIONER

## 2024-01-29 PROCEDURE — 83036 HEMOGLOBIN GLYCOSYLATED A1C: CPT | Performed by: NURSE PRACTITIONER

## 2024-01-29 PROCEDURE — 3079F DIAST BP 80-89 MM HG: CPT | Performed by: NURSE PRACTITIONER

## 2024-01-29 PROCEDURE — 99214 OFFICE O/P EST MOD 30 MIN: CPT | Performed by: NURSE PRACTITIONER

## 2024-01-29 RX ORDER — SCOLOPAMINE TRANSDERMAL SYSTEM 1 MG/1
1 PATCH, EXTENDED RELEASE TRANSDERMAL
Qty: 3 PATCH | Refills: 0 | Status: SHIPPED | OUTPATIENT
Start: 2024-01-29

## 2024-01-29 RX ORDER — TIRZEPATIDE 2.5 MG/.5ML
2.5 INJECTION, SOLUTION SUBCUTANEOUS WEEKLY
Qty: 2 ML | Refills: 0 | Status: SHIPPED | OUTPATIENT
Start: 2024-01-29 | End: 2024-02-20

## 2024-01-29 SDOH — ECONOMIC STABILITY: FOOD INSECURITY: WITHIN THE PAST 12 MONTHS, YOU WORRIED THAT YOUR FOOD WOULD RUN OUT BEFORE YOU GOT MONEY TO BUY MORE.: NEVER TRUE

## 2024-01-29 SDOH — ECONOMIC STABILITY: INCOME INSECURITY: HOW HARD IS IT FOR YOU TO PAY FOR THE VERY BASICS LIKE FOOD, HOUSING, MEDICAL CARE, AND HEATING?: NOT HARD AT ALL

## 2024-01-29 SDOH — ECONOMIC STABILITY: FOOD INSECURITY: WITHIN THE PAST 12 MONTHS, THE FOOD YOU BOUGHT JUST DIDN'T LAST AND YOU DIDN'T HAVE MONEY TO GET MORE.: NEVER TRUE

## 2024-01-29 ASSESSMENT — ANXIETY QUESTIONNAIRES
1. FEELING NERVOUS, ANXIOUS, OR ON EDGE: 0
4. TROUBLE RELAXING: 0
6. BECOMING EASILY ANNOYED OR IRRITABLE: 0
7. FEELING AFRAID AS IF SOMETHING AWFUL MIGHT HAPPEN: 0
GAD7 TOTAL SCORE: 0
2. NOT BEING ABLE TO STOP OR CONTROL WORRYING: 0
5. BEING SO RESTLESS THAT IT IS HARD TO SIT STILL: 0
3. WORRYING TOO MUCH ABOUT DIFFERENT THINGS: 0
IF YOU CHECKED OFF ANY PROBLEMS ON THIS QUESTIONNAIRE, HOW DIFFICULT HAVE THESE PROBLEMS MADE IT FOR YOU TO DO YOUR WORK, TAKE CARE OF THINGS AT HOME, OR GET ALONG WITH OTHER PEOPLE: NOT DIFFICULT AT ALL

## 2024-01-29 ASSESSMENT — PATIENT HEALTH QUESTIONNAIRE - PHQ9
2. FEELING DOWN, DEPRESSED OR HOPELESS: 0
SUM OF ALL RESPONSES TO PHQ QUESTIONS 1-9: 0
1. LITTLE INTEREST OR PLEASURE IN DOING THINGS: 0
DEPRESSION UNABLE TO ASSESS: FUNCTIONAL CAPACITY MOTIVATION LIMITS ACCURACY
SUM OF ALL RESPONSES TO PHQ9 QUESTIONS 1 & 2: 0
SUM OF ALL RESPONSES TO PHQ QUESTIONS 1-9: 0

## 2024-01-29 ASSESSMENT — ENCOUNTER SYMPTOMS
NAUSEA: 0
COUGH: 0
SHORTNESS OF BREATH: 0
VOMITING: 0
EYE DISCHARGE: 1
DIARRHEA: 0

## 2024-01-29 NOTE — PATIENT INSTRUCTIONS
- try eye lubricating drops (thera Tears)  - see eye doctor if that does not help  - schedule with sleep clinic  - schedule mammogram   - Start Zepbound for weight loss, take 2.5 mg weekly x 4 weeks and then will increase to 5 mg   - 150 minutes of exercise/week

## 2024-01-29 NOTE — PROGRESS NOTES
2024     Chief Complaint   Patient presents with    Allergies     Eye watering          Sussy Casillas (:  1975) is a 48 y.o. female, here for evaluation of the following medical concerns:    HPI    Symptoms started 3 months ago. Complains of eye watering. Both eyes: Eye watery, +sensitive to light (computer), very rarely has itching in left eye, started using reading glasses; +eye fatigue    Has been having headaches for the last two months. Had head CT which was negative. Frontal and occipital headaches. Less frequent then last month. Rarely treats with acute pain reliever- Excedrin is effective. Around this same time she started using her CPAP machine again. She was started on that in 2019, since that time gained about 30 lbs. Noticed snoring was getting louder so she decided to use CPAP again.     Menses have been irregular the last 3 months, has been having trouble managing her weight. Weight is increasing. She is prediabetic and hypertensive. Interested in GLP1 therapy.     Review of Systems   Constitutional:  Negative for chills, fatigue and fever.   Eyes:  Positive for discharge (watering).   Respiratory:  Negative for cough and shortness of breath.    Cardiovascular:  Negative for chest pain and leg swelling.   Gastrointestinal:  Negative for diarrhea, nausea and vomiting.   Neurological:  Negative for dizziness and headaches.   All other systems reviewed and are negative.      Prior to Visit Medications    Medication Sig Taking? Authorizing Provider   scopolamine (TRANSDERM-SCOP) transdermal patch Place 1 patch onto the skin every 72 hours Yes Bettina Patel APRN - CNP   Tirzepatide-Weight Management (ZEPBOUND) 2.5 MG/0.5ML SOAJ Inject 2.5 mg into the skin once a week for 4 doses Yes Bettina Patel APRN - CNP   losartan (COZAAR) 100 MG tablet TAKE 1 TABLET BY MOUTH EVERY DAY Yes Pillo Rodriguez MD   levothyroxine (SYNTHROID) 75 MCG tablet Take 1 tablet by mouth

## 2024-02-01 ENCOUNTER — HOSPITAL ENCOUNTER (OUTPATIENT)
Dept: MAMMOGRAPHY | Age: 49
Discharge: HOME | End: 2024-02-01
Payer: COMMERCIAL

## 2024-02-01 DIAGNOSIS — Z12.31 ENCOUNTER FOR SCREENING MAMMOGRAM FOR MALIGNANT NEOPLASM OF BREAST: ICD-10-CM

## 2024-02-01 PROCEDURE — 77063 BREAST TOMOSYNTHESIS BI: CPT

## 2024-02-09 NOTE — PROGRESS NOTES
is a small, moderate intensity, fixed apical defect with hypokinesis,  consistent with infarct.  There is a small, mild intensity, fixed inferolateral defect consistent with  diaphragmatic attenuation artifact.    No evidence of myocardium at risk for significant reversible ischemia     Stress test 2019  IMPRESSION:   This is a normal study   Normal EF   The right ventricular size and function appears normal.     Good quality study   Attenuation artifact absent.   No prior studies available for comparison.   The risk of this study is low     Stress test 2/27/2021  Conclusions    Summary  There is normal isotope uptake at stress and rest.  No evidence of myocardium at risk for significant reversible ischemia.    Normal LV function.    Overall findings represent a low risk scan.     EKG 2/27/2021  NSR, rate 63    Echocardiogram 2/23/2022  Summary   Patient had severe chest pain at 10: 00, stopped echo and activated rapid   response. Restated started test at 10:36.   Frequent PVCs seen during 2nd portion of echo.   -- Normal left ventricle size, wall thickness (upper limit of normal) and   systolic function with an estimated ejection fraction of 60-65%. No regional   wall motion abnormalities are seen. Normal left ventricular diastolic   filling pressure.   -- Normal wall motion during chest pain.   -- Trace mitral and tricuspid regurgitation.    Left heart cath 2/23/2022  Procedure Findings:  1. Mild non-obstructive coronary artery disease.              ~patent LAD stent  2. Normal left ventricular function with EF estimated at 55-60%  3. Normal left heart hemodynamics     Latest Reference Range & Units 12/06/23 09:20   Cholesterol, Total 0 - 199 mg/dL 165   HDL Cholesterol 40 - 60 mg/dL 43   LDL Calculated <100 mg/dL 84   Triglycerides 0 - 150 mg/dL 189 (H)   VLDL Cholesterol Calculated Not Established mg/dL 38       Assessment:   Chest pain - atypical. No recent. Suspect sig non cardiac component. Also possible due

## 2024-02-12 ENCOUNTER — OFFICE VISIT (OUTPATIENT)
Dept: CARDIOLOGY CLINIC | Age: 49
End: 2024-02-12
Payer: COMMERCIAL

## 2024-02-12 VITALS
OXYGEN SATURATION: 98 % | WEIGHT: 240 LBS | HEART RATE: 89 BPM | DIASTOLIC BLOOD PRESSURE: 78 MMHG | BODY MASS INDEX: 38.57 KG/M2 | HEIGHT: 66 IN | SYSTOLIC BLOOD PRESSURE: 140 MMHG

## 2024-02-12 DIAGNOSIS — E78.5 HYPERLIPIDEMIA, UNSPECIFIED HYPERLIPIDEMIA TYPE: Chronic | ICD-10-CM

## 2024-02-12 DIAGNOSIS — I25.118 CORONARY ARTERY DISEASE INVOLVING NATIVE CORONARY ARTERY OF NATIVE HEART WITH OTHER FORM OF ANGINA PECTORIS (HCC): Primary | Chronic | ICD-10-CM

## 2024-02-12 DIAGNOSIS — I10 PRIMARY HYPERTENSION: Chronic | ICD-10-CM

## 2024-02-12 PROCEDURE — 3078F DIAST BP <80 MM HG: CPT | Performed by: INTERNAL MEDICINE

## 2024-02-12 PROCEDURE — 3077F SYST BP >= 140 MM HG: CPT | Performed by: INTERNAL MEDICINE

## 2024-02-12 PROCEDURE — 99214 OFFICE O/P EST MOD 30 MIN: CPT | Performed by: INTERNAL MEDICINE

## 2024-02-12 RX ORDER — VERAPAMIL HYDROCHLORIDE 240 MG/1
240 TABLET, FILM COATED, EXTENDED RELEASE ORAL DAILY
Qty: 90 TABLET | Refills: 3 | Status: SHIPPED | OUTPATIENT
Start: 2024-02-12

## 2024-02-12 NOTE — PATIENT INSTRUCTIONS
Plan:  Cardiac medications reviewed including indications and pertinent side effects. Medication list updated at this visit.   Check blood pressure and heart rate at home a few times per week- keep a log with dates and times and bring to office visit   Regular exercise and following a healthy diet encouraged   ~Low carb diet   ~Recommend more dedicated exercise- 30-45 minutes of sustained cardio exercise 4-5 days per week  Follow up with me in 1 year

## 2024-02-13 RX ORDER — ROSUVASTATIN CALCIUM 40 MG/1
TABLET, COATED ORAL
Qty: 90 TABLET | Refills: 3 | Status: SHIPPED | OUTPATIENT
Start: 2024-02-13

## 2024-02-21 ASSESSMENT — ENCOUNTER SYMPTOMS: DIARRHEA: 1

## 2024-02-21 NOTE — PROGRESS NOTES
2/22/2024    This is a 48 y.o. female   Chief Complaint   Patient presents with    Nausea & Vomiting     Nausea, can not eat     Diarrhea   .    HPI  Pt presents today for the following:    Vomiting/Diarrhea:  Sx began 02/16/24, vomiting ceased on 02/18/24 but unable to eat d/t nausea, boyfriend's coworkers sick. Denies blood or mucus in the diarrhea.  Diarrhea has improved.     HTN: Blood pressure elevated today at 161/93, taking Atenolol 50 mg daily, losartan 100 mg daily, and verapamil 240 mg daily. Hasn't been able to take her medication, avg home readings 136/60.  Past Medical History:   Diagnosis Date    CAD (coronary artery disease)     GERD (gastroesophageal reflux disease) 06/06/2018    Heart attack (HCC) 11/19/2016    Hyperlipidemia 12/29/2016    Hypertension     Obesity (BMI 30-39.9) 08/12/2017    ABHILASH on CPAP 06/06/2018       Office Visit on 01/29/2024   Component Date Value Ref Range Status    Hemoglobin A1C 01/29/2024 5.8  % Final       Review of Systems   Gastrointestinal:  Positive for diarrhea. Negative for vomiting (Resolved).       BP (!) 160/93 (Site: Right Upper Arm, Position: Sitting, Cuff Size: Large Adult)   Pulse 81   Temp 96.8 °F (36 °C) (Temporal)   Resp 16   Wt 106.1 kg (233 lb 12.8 oz)   LMP  (Approximate)   SpO2 98%   BMI 37.74 kg/m²     Physical Exam  Vitals reviewed.   Constitutional:       Appearance: Normal appearance.   Psychiatric:         Mood and Affect: Mood normal.         Plan   Diagnosis Orders   1. Nausea  ondansetron (ZOFRAN) 4 MG tablet      2. Diarrhea, unspecified type  Improved, Imodium          Return if symptoms worsen or fail to improve.

## 2024-02-22 ENCOUNTER — TELEPHONE (OUTPATIENT)
Dept: FAMILY MEDICINE CLINIC | Age: 49
End: 2024-02-22

## 2024-02-22 ENCOUNTER — OFFICE VISIT (OUTPATIENT)
Dept: FAMILY MEDICINE CLINIC | Age: 49
End: 2024-02-22
Payer: COMMERCIAL

## 2024-02-22 VITALS
SYSTOLIC BLOOD PRESSURE: 135 MMHG | BODY MASS INDEX: 37.74 KG/M2 | WEIGHT: 233.8 LBS | RESPIRATION RATE: 16 BRPM | TEMPERATURE: 96.8 F | OXYGEN SATURATION: 98 % | DIASTOLIC BLOOD PRESSURE: 60 MMHG | HEART RATE: 81 BPM

## 2024-02-22 DIAGNOSIS — R11.0 NAUSEA: Primary | ICD-10-CM

## 2024-02-22 DIAGNOSIS — R19.7 DIARRHEA, UNSPECIFIED TYPE: ICD-10-CM

## 2024-02-22 PROCEDURE — 3080F DIAST BP >= 90 MM HG: CPT | Performed by: FAMILY MEDICINE

## 2024-02-22 PROCEDURE — 99213 OFFICE O/P EST LOW 20 MIN: CPT | Performed by: FAMILY MEDICINE

## 2024-02-22 PROCEDURE — 3077F SYST BP >= 140 MM HG: CPT | Performed by: FAMILY MEDICINE

## 2024-02-22 RX ORDER — ONDANSETRON 4 MG/1
4 TABLET, FILM COATED ORAL 3 TIMES DAILY PRN
Qty: 15 TABLET | Refills: 2 | Status: SHIPPED | OUTPATIENT
Start: 2024-02-22

## 2024-02-22 ASSESSMENT — PATIENT HEALTH QUESTIONNAIRE - PHQ9
SUM OF ALL RESPONSES TO PHQ QUESTIONS 1-9: 0
SUM OF ALL RESPONSES TO PHQ9 QUESTIONS 1 & 2: 0
SUM OF ALL RESPONSES TO PHQ QUESTIONS 1-9: 0
2. FEELING DOWN, DEPRESSED OR HOPELESS: 0
1. LITTLE INTEREST OR PLEASURE IN DOING THINGS: 0

## 2024-02-22 ASSESSMENT — ENCOUNTER SYMPTOMS: VOMITING: 0

## 2024-02-22 NOTE — TELEPHONE ENCOUNTER
Patient had an appt today with Dr. Schroeder. She is a the pharmacy now, but their escribe system is down. The only way they can get her prescription is for it to be called in or faxed in.     ondansetron (ZOFRAN) 4 MG tablet     loperamide (ANTI-DIARRHEAL) 1 MG/5ML solution

## 2024-04-03 ENCOUNTER — OFFICE VISIT (OUTPATIENT)
Dept: ORTHOPEDIC SURGERY | Age: 49
End: 2024-04-03
Payer: COMMERCIAL

## 2024-04-03 VITALS — HEIGHT: 66 IN | WEIGHT: 223 LBS | BODY MASS INDEX: 35.84 KG/M2

## 2024-04-03 DIAGNOSIS — M79.672 LEFT FOOT PAIN: Primary | ICD-10-CM

## 2024-04-03 DIAGNOSIS — M77.52 BONE SPUR OF LEFT FOOT: ICD-10-CM

## 2024-04-03 DIAGNOSIS — S96.912A MUSCLE STRAIN OF LEFT FOOT, INITIAL ENCOUNTER: ICD-10-CM

## 2024-04-03 PROCEDURE — 99214 OFFICE O/P EST MOD 30 MIN: CPT | Performed by: NURSE PRACTITIONER

## 2024-04-03 PROCEDURE — MISCD124 DARCO POST-OP SHOE BRACE (RETAIL): Performed by: NURSE PRACTITIONER

## 2024-04-03 RX ORDER — METHYLPREDNISOLONE 4 MG/1
TABLET ORAL
Qty: 1 KIT | Refills: 0 | Status: SHIPPED | OUTPATIENT
Start: 2024-04-03

## 2024-04-03 NOTE — PROGRESS NOTES
Plan:     Left foot strain.  Mild left foot arthritis.  At this point patient was placed in a postop shoe.  Medrol Dosepak was sent to her pharmacy.  Advised not to take any OTC NSAIDs while on oral steroids due to possible GI effects.  She can continue to RICE.  She can be weightbearing as tolerated left lower extremity.  Patient will follow-up with Dr. Hill in 1 to 2 weeks as needed.  All questions have been answered.  Patient understands and agrees with plan of care.    This dictation was generated by voice recognition computer software.  Although all attempts are made to edit the dictation for accuracy, there may be errors in the transcription that are not intended.

## 2024-04-04 DIAGNOSIS — I10 ESSENTIAL HYPERTENSION: Chronic | ICD-10-CM

## 2024-04-07 RX ORDER — ATENOLOL 50 MG/1
TABLET ORAL
Qty: 30 TABLET | Refills: 2 | Status: SHIPPED | OUTPATIENT
Start: 2024-04-07

## 2024-06-12 SDOH — ECONOMIC STABILITY: FOOD INSECURITY: WITHIN THE PAST 12 MONTHS, THE FOOD YOU BOUGHT JUST DIDN'T LAST AND YOU DIDN'T HAVE MONEY TO GET MORE.: NEVER TRUE

## 2024-06-12 SDOH — ECONOMIC STABILITY: INCOME INSECURITY: HOW HARD IS IT FOR YOU TO PAY FOR THE VERY BASICS LIKE FOOD, HOUSING, MEDICAL CARE, AND HEATING?: NOT HARD AT ALL

## 2024-06-12 SDOH — ECONOMIC STABILITY: FOOD INSECURITY: WITHIN THE PAST 12 MONTHS, YOU WORRIED THAT YOUR FOOD WOULD RUN OUT BEFORE YOU GOT MONEY TO BUY MORE.: NEVER TRUE

## 2024-06-12 ASSESSMENT — ANXIETY QUESTIONNAIRES
IF YOU CHECKED OFF ANY PROBLEMS ON THIS QUESTIONNAIRE, HOW DIFFICULT HAVE THESE PROBLEMS MADE IT FOR YOU TO DO YOUR WORK, TAKE CARE OF THINGS AT HOME, OR GET ALONG WITH OTHER PEOPLE: NOT DIFFICULT AT ALL
4. TROUBLE RELAXING: NOT AT ALL
7. FEELING AFRAID AS IF SOMETHING AWFUL MIGHT HAPPEN: NOT AT ALL
6. BECOMING EASILY ANNOYED OR IRRITABLE: NOT AT ALL
GAD7 TOTAL SCORE: 0
1. FEELING NERVOUS, ANXIOUS, OR ON EDGE: NOT AT ALL
3. WORRYING TOO MUCH ABOUT DIFFERENT THINGS: NOT AT ALL
2. NOT BEING ABLE TO STOP OR CONTROL WORRYING: NOT AT ALL
5. BEING SO RESTLESS THAT IT IS HARD TO SIT STILL: NOT AT ALL

## 2024-06-12 ASSESSMENT — PATIENT HEALTH QUESTIONNAIRE - PHQ9
SUM OF ALL RESPONSES TO PHQ QUESTIONS 1-9: 0
SUM OF ALL RESPONSES TO PHQ9 QUESTIONS 1 & 2: 0
SUM OF ALL RESPONSES TO PHQ QUESTIONS 1-9: 0
1. LITTLE INTEREST OR PLEASURE IN DOING THINGS: NOT AT ALL
SUM OF ALL RESPONSES TO PHQ QUESTIONS 1-9: 0
DEPRESSION UNABLE TO ASSESS: FUNCTIONAL CAPACITY MOTIVATION LIMITS ACCURACY
SUM OF ALL RESPONSES TO PHQ QUESTIONS 1-9: 0
2. FEELING DOWN, DEPRESSED OR HOPELESS: NOT AT ALL

## 2024-06-12 NOTE — PROGRESS NOTES
Sussy Casillas, was evaluated through a synchronous (real-time) audio-video encounter. The patient (or guardian if applicable) is aware that this is a billable service, which includes applicable co-pays. This Virtual Visit was conducted with patient's (and/or legal guardian's) consent. Patient identification was verified, and a caregiver was present when appropriate.   The patient was located at Home: [address]  [place] [address]  Provider was located at Home (Appt Dept State): [place]  Confirm you are appropriately licensed, registered, or certified to deliver care in the state where the patient is located as indicated above. If you are not or unsure, please re-schedule the visit: Yes, I confirm.     Sussy Casillas (:  1975) is a Established patient, presenting virtually for evaluation of the following:    Assessment & Plan   Below is the assessment and plan developed based on review of pertinent history, physical exam, labs, studies, and medications.  1. Morbid obesity (HCC)  -     Tirzepatide-Weight Management (ZEPBOUND) 5 MG/0.5ML SOAJ; Inject 5 mg into the skin once a week, Disp-2 mL, R-0Normal    Doing well with current treatment  Increase Zepound to 5 mg weekly  Continue working on healthy dietary choices  Continue regular exercise 150 mins/week  Weight check in 4 weeks           Subjective   HPI  Sussy is seen today for follow up on weight management.   She is on week three of Zepbound 2.5 mg  She is tolerating medication, denies side effects  Has noticed decreased appetite and earlier satiety since starting medication  Exercising every day- walking after work     Review of Systems   Constitutional:  Negative for chills, fatigue and fever.   Respiratory:  Negative for cough and shortness of breath.    Cardiovascular:  Negative for chest pain and leg swelling.   Gastrointestinal:  Negative for diarrhea, nausea and vomiting.   Neurological:  Negative for dizziness and headaches.

## 2024-06-13 ENCOUNTER — TELEMEDICINE (OUTPATIENT)
Dept: FAMILY MEDICINE CLINIC | Age: 49
End: 2024-06-13
Payer: COMMERCIAL

## 2024-06-13 DIAGNOSIS — E66.01 MORBID OBESITY (HCC): Primary | ICD-10-CM

## 2024-06-13 PROCEDURE — 99212 OFFICE O/P EST SF 10 MIN: CPT | Performed by: NURSE PRACTITIONER

## 2024-06-13 RX ORDER — TIRZEPATIDE 5 MG/.5ML
5 INJECTION, SOLUTION SUBCUTANEOUS WEEKLY
Qty: 2 ML | Refills: 0 | Status: SHIPPED | OUTPATIENT
Start: 2024-06-13

## 2024-06-13 RX ORDER — LEVOTHYROXINE SODIUM 0.07 MG/1
75 TABLET ORAL DAILY
Qty: 90 TABLET | Refills: 3 | Status: SHIPPED | OUTPATIENT
Start: 2024-06-13

## 2024-06-13 ASSESSMENT — ENCOUNTER SYMPTOMS
COUGH: 0
VOMITING: 0
SHORTNESS OF BREATH: 0
DIARRHEA: 0
NAUSEA: 0

## 2024-07-05 DIAGNOSIS — E66.01 MORBID OBESITY (HCC): ICD-10-CM

## 2024-07-08 RX ORDER — TIRZEPATIDE 5 MG/.5ML
5 INJECTION, SOLUTION SUBCUTANEOUS WEEKLY
Qty: 2 ML | Refills: 0 | Status: SHIPPED | OUTPATIENT
Start: 2024-07-08

## 2024-08-13 ENCOUNTER — OFFICE VISIT (OUTPATIENT)
Dept: FAMILY MEDICINE CLINIC | Age: 49
End: 2024-08-13
Payer: COMMERCIAL

## 2024-08-13 VITALS
OXYGEN SATURATION: 98 % | HEART RATE: 97 BPM | DIASTOLIC BLOOD PRESSURE: 80 MMHG | WEIGHT: 244.4 LBS | SYSTOLIC BLOOD PRESSURE: 128 MMHG | BODY MASS INDEX: 39.28 KG/M2 | HEIGHT: 66 IN | TEMPERATURE: 98.7 F | RESPIRATION RATE: 16 BRPM

## 2024-08-13 DIAGNOSIS — S91.209A AVULSION OF TOENAIL OF RIGHT FOOT: Primary | ICD-10-CM

## 2024-08-13 DIAGNOSIS — M79.674 GREAT TOE PAIN, RIGHT: ICD-10-CM

## 2024-08-13 PROCEDURE — 3074F SYST BP LT 130 MM HG: CPT | Performed by: INTERNAL MEDICINE

## 2024-08-13 PROCEDURE — 99213 OFFICE O/P EST LOW 20 MIN: CPT | Performed by: INTERNAL MEDICINE

## 2024-08-13 PROCEDURE — 3079F DIAST BP 80-89 MM HG: CPT | Performed by: INTERNAL MEDICINE

## 2024-08-13 RX ORDER — CEPHALEXIN 500 MG/1
500 CAPSULE ORAL 2 TIMES DAILY
Qty: 20 CAPSULE | Refills: 0 | Status: SHIPPED | OUTPATIENT
Start: 2024-08-13 | End: 2024-08-23

## 2024-08-13 ASSESSMENT — PATIENT HEALTH QUESTIONNAIRE - PHQ9
2. FEELING DOWN, DEPRESSED OR HOPELESS: NOT AT ALL
SUM OF ALL RESPONSES TO PHQ QUESTIONS 1-9: 0
SUM OF ALL RESPONSES TO PHQ QUESTIONS 1-9: 0
SUM OF ALL RESPONSES TO PHQ9 QUESTIONS 1 & 2: 0
SUM OF ALL RESPONSES TO PHQ QUESTIONS 1-9: 0
SUM OF ALL RESPONSES TO PHQ QUESTIONS 1-9: 0
1. LITTLE INTEREST OR PLEASURE IN DOING THINGS: NOT AT ALL

## 2024-09-09 DIAGNOSIS — I10 ESSENTIAL HYPERTENSION: Chronic | ICD-10-CM

## 2024-09-09 RX ORDER — ATENOLOL 50 MG/1
TABLET ORAL
Qty: 90 TABLET | Refills: 1 | Status: SHIPPED | OUTPATIENT
Start: 2024-09-09

## 2024-09-13 ENCOUNTER — OFFICE VISIT (OUTPATIENT)
Dept: FAMILY MEDICINE CLINIC | Age: 49
End: 2024-09-13

## 2024-09-13 VITALS
TEMPERATURE: 97.7 F | HEART RATE: 85 BPM | DIASTOLIC BLOOD PRESSURE: 90 MMHG | WEIGHT: 248 LBS | BODY MASS INDEX: 40.03 KG/M2 | SYSTOLIC BLOOD PRESSURE: 139 MMHG | OXYGEN SATURATION: 97 %

## 2024-09-13 DIAGNOSIS — Z87.898 H/O MOTION SICKNESS: ICD-10-CM

## 2024-09-13 DIAGNOSIS — J06.9 VIRAL URI: ICD-10-CM

## 2024-09-13 DIAGNOSIS — R07.9 CHEST PAIN, UNSPECIFIED TYPE: Primary | ICD-10-CM

## 2024-09-13 LAB
INFLUENZA A ANTIBODY: NORMAL
INFLUENZA B ANTIBODY: NORMAL
Lab: 0
PERFORMING INSTRUMENT: NORMAL
QC PASS/FAIL: NORMAL
SARS-COV-2, POC: NORMAL

## 2024-09-13 RX ORDER — SCOLOPAMINE TRANSDERMAL SYSTEM 1 MG/1
1 PATCH, EXTENDED RELEASE TRANSDERMAL
Qty: 4 PATCH | Refills: 0 | Status: SHIPPED | OUTPATIENT
Start: 2024-09-13

## 2024-09-13 RX ORDER — METHYLPREDNISOLONE 4 MG
TABLET, DOSE PACK ORAL
Qty: 1 KIT | Refills: 0 | Status: SHIPPED | OUTPATIENT
Start: 2024-09-13

## 2024-09-13 ASSESSMENT — ENCOUNTER SYMPTOMS: NAUSEA: 1

## 2024-09-18 ENCOUNTER — TELEPHONE (OUTPATIENT)
Dept: FAMILY MEDICINE CLINIC | Age: 49
End: 2024-09-18

## 2024-09-19 RX ORDER — AZITHROMYCIN 250 MG/1
TABLET, FILM COATED ORAL
Qty: 6 TABLET | Refills: 0 | Status: SHIPPED | OUTPATIENT
Start: 2024-09-19 | End: 2024-09-29

## 2024-11-01 ENCOUNTER — PATIENT MESSAGE (OUTPATIENT)
Dept: FAMILY MEDICINE CLINIC | Age: 49
End: 2024-11-01

## 2024-11-07 NOTE — TELEPHONE ENCOUNTER
ASK HER TO SCHEDULE AN APPOINTMENT. WE MAY BE ABLE TO GET WEGOVY APPROVED THROUGH HER INSURANCE FOR WEIGHT MANAGEMENT AND CARDIOVASCULAR RISK REDUCTION SINCE SHE HAS CORONARY ARTERY DISEASE.

## 2024-11-15 DIAGNOSIS — E66.01 MORBID OBESITY: ICD-10-CM

## 2024-11-18 RX ORDER — TIRZEPATIDE 5 MG/.5ML
5 INJECTION, SOLUTION SUBCUTANEOUS WEEKLY
Qty: 2 ML | Refills: 0 | OUTPATIENT
Start: 2024-11-18

## 2024-11-20 ENCOUNTER — OFFICE VISIT (OUTPATIENT)
Age: 49
End: 2024-11-20

## 2024-11-20 VITALS
OXYGEN SATURATION: 95 % | SYSTOLIC BLOOD PRESSURE: 128 MMHG | HEART RATE: 92 BPM | TEMPERATURE: 97.7 F | DIASTOLIC BLOOD PRESSURE: 76 MMHG

## 2024-11-20 DIAGNOSIS — R50.9 FEVER, UNSPECIFIED FEVER CAUSE: ICD-10-CM

## 2024-11-20 DIAGNOSIS — R50.9 CHILLS WITH FEVER: ICD-10-CM

## 2024-11-20 DIAGNOSIS — J01.90 ACUTE SINUSITIS, RECURRENCE NOT SPECIFIED, UNSPECIFIED LOCATION: Primary | ICD-10-CM

## 2024-11-20 LAB
INFLUENZA A ANTIBODY: NEGATIVE
INFLUENZA B ANTIBODY: NEGATIVE
Lab: NORMAL
PERFORMING INSTRUMENT: NORMAL
QC PASS/FAIL: NORMAL
SARS-COV-2, POC: NORMAL

## 2024-11-20 RX ORDER — DEXTROMETHORPHAN HYDROBROMIDE AND PROMETHAZINE HYDROCHLORIDE 15; 6.25 MG/5ML; MG/5ML
5 SYRUP ORAL 4 TIMES DAILY PRN
Qty: 100 ML | Refills: 0 | Status: ON HOLD | OUTPATIENT
Start: 2024-11-20 | End: 2024-11-25

## 2024-11-20 RX ORDER — AZITHROMYCIN 250 MG/1
250 TABLET, FILM COATED ORAL DAILY
Qty: 6 TABLET | Refills: 0 | Status: ON HOLD | OUTPATIENT
Start: 2024-11-20

## 2024-11-20 NOTE — PATIENT INSTRUCTIONS
Take any prescribed medications as directed.     You may additionally take over-the-counter antihistamine such as allegra, zyrtec, claritin.     Alternate Tylenol and Motrin every 4 hours as directed as needed for chills, fever.     Follow-up with your primary care physician or return if not improved.     Go to the ER if you develop significant shortness of breath, difficulty breathing, high fever, chest pain or other major concerning symptoms.

## 2024-11-20 NOTE — PROGRESS NOTES
Sussy Casillas (:  1975) is a 48 y.o. female,  here for evaluation of the following chief complaint(s): Fever (Pt states she has had fever and chills that started Monday and states today she has been using Advil and Tylenol to keep fever down/Pt states she feels nauseas and SOB)      Sussy Casillas is a New patient.   Last Urgent Care Visit: Visit date not found  I have reviewed the patient's medications and basic medical history; see Medication Reconciliation.    ASSESSMENT/PLAN:  Diagnosis:     ICD-10-CM    1. Acute sinusitis, recurrence not specified, unspecified location  J01.90 azithromycin (ZITHROMAX) 250 MG tablet     promethazine-dextromethorphan (PROMETHAZINE-DM) 6.25-15 MG/5ML syrup      2. Chills with fever  R50.9 POCT Influenza A/B     POCT COVID-19, Antigen      3. Fever, unspecified fever cause  R50.9 POCT Influenza A/B     POCT COVID-19, Antigen             Medical Decision Making:    Patient was seen at Urgent Care today for fever, chills, fatigue and myalgias.     On exam she appears chilled but otherwise well.   Lungs clear. HENT exam benign.     COVID was NEGATIVE;  Influenza was NEGATIVE;    Prescriptions provided: Zpak; and Promethazine-DM;    Patient was discharged home with follow-up and return precautions.     Patient did not have elevated blood pressure greater than 130/80. Therefore, referral to PCP for HTN is not indicated      Results:  Results for orders placed or performed in visit on 24   POCT Influenza A/B   Result Value Ref Range    Influenza A Ab negative     Influenza B Ab negative    POCT COVID-19, Antigen   Result Value Ref Range    SARS-COV-2, POC Not-Detected Not Detected    Lot Number [id number]     QC Pass/Fail pass     Performing Instrument [employer]           SUBJECTIVE/OBJECTIVE:  HPI:   This is a 48 y.o. female that presents today with complaint of multiple symptoms including generalized fatigue, myalgias.     Monday and  and  she was not

## 2024-11-22 ENCOUNTER — APPOINTMENT (OUTPATIENT)
Dept: GENERAL RADIOLOGY | Age: 49
DRG: 392 | End: 2024-11-22
Payer: COMMERCIAL

## 2024-11-22 ENCOUNTER — APPOINTMENT (OUTPATIENT)
Dept: CT IMAGING | Age: 49
DRG: 392 | End: 2024-11-22
Payer: COMMERCIAL

## 2024-11-22 ENCOUNTER — OFFICE VISIT (OUTPATIENT)
Dept: FAMILY MEDICINE CLINIC | Age: 49
End: 2024-11-22
Payer: COMMERCIAL

## 2024-11-22 ENCOUNTER — HOSPITAL ENCOUNTER (INPATIENT)
Age: 49
LOS: 2 days | Discharge: HOME | DRG: 392 | End: 2024-11-28
Attending: EMERGENCY MEDICINE | Admitting: STUDENT IN AN ORGANIZED HEALTH CARE EDUCATION/TRAINING PROGRAM
Payer: COMMERCIAL

## 2024-11-22 VITALS
RESPIRATION RATE: 18 BRPM | DIASTOLIC BLOOD PRESSURE: 96 MMHG | TEMPERATURE: 98.4 F | HEART RATE: 106 BPM | SYSTOLIC BLOOD PRESSURE: 142 MMHG | WEIGHT: 237 LBS | OXYGEN SATURATION: 97 % | BODY MASS INDEX: 38.25 KG/M2

## 2024-11-22 DIAGNOSIS — R05.1 ACUTE COUGH: ICD-10-CM

## 2024-11-22 DIAGNOSIS — R11.2 ACUTE NAUSEA WITH NONBILIOUS VOMITING: ICD-10-CM

## 2024-11-22 DIAGNOSIS — E86.0 DEHYDRATION: Primary | ICD-10-CM

## 2024-11-22 DIAGNOSIS — R51.9 ACUTE NONINTRACTABLE HEADACHE, UNSPECIFIED HEADACHE TYPE: ICD-10-CM

## 2024-11-22 DIAGNOSIS — J06.9 VIRAL URI: Primary | ICD-10-CM

## 2024-11-22 DIAGNOSIS — R50.9 FEVER, UNKNOWN ORIGIN: ICD-10-CM

## 2024-11-22 DIAGNOSIS — R10.9 ABDOMINAL PAIN WITH VOMITING: ICD-10-CM

## 2024-11-22 DIAGNOSIS — R11.10 ABDOMINAL PAIN WITH VOMITING: ICD-10-CM

## 2024-11-22 DIAGNOSIS — M54.2 NECK PAIN: ICD-10-CM

## 2024-11-22 DIAGNOSIS — R11.0 INTRACTABLE NAUSEA: ICD-10-CM

## 2024-11-22 DIAGNOSIS — E86.0 ACUTE DEHYDRATION: ICD-10-CM

## 2024-11-22 LAB
ALBUMIN SERPL-MCNC: 3.9 G/DL (ref 3.4–5)
ALBUMIN/GLOB SERPL: 1.2 {RATIO} (ref 1.1–2.2)
ALP SERPL-CCNC: 75 U/L (ref 40–129)
ALT SERPL-CCNC: 39 U/L (ref 10–40)
ANION GAP SERPL CALCULATED.3IONS-SCNC: 14 MMOL/L (ref 3–16)
AST SERPL-CCNC: 39 U/L (ref 15–37)
BACTERIA URNS QL MICRO: ABNORMAL /HPF
BASOPHILS # BLD: 0.1 K/UL (ref 0–0.2)
BASOPHILS NFR BLD: 0.7 %
BILIRUB SERPL-MCNC: 0.3 MG/DL (ref 0–1)
BILIRUB UR QL STRIP.AUTO: NEGATIVE
BUN SERPL-MCNC: 11 MG/DL (ref 7–20)
CALCIUM SERPL-MCNC: 8.9 MG/DL (ref 8.3–10.6)
CHLORIDE SERPL-SCNC: 98 MMOL/L (ref 99–110)
CK SERPL-CCNC: 100 U/L (ref 26–192)
CLARITY UR: CLEAR
CO2 SERPL-SCNC: 25 MMOL/L (ref 21–32)
COLOR UR: ABNORMAL
CREAT SERPL-MCNC: 0.8 MG/DL (ref 0.6–1.1)
DEPRECATED RDW RBC AUTO: 15.3 % (ref 12.4–15.4)
EOSINOPHIL # BLD: 0 K/UL (ref 0–0.6)
EOSINOPHIL NFR BLD: 0.3 %
EPI CELLS #/AREA URNS HPF: ABNORMAL /HPF (ref 0–5)
FLUAV RNA RESP QL NAA+PROBE: NOT DETECTED
FLUBV RNA RESP QL NAA+PROBE: NOT DETECTED
GFR SERPLBLD CREATININE-BSD FMLA CKD-EPI: 89 ML/MIN/{1.73_M2}
GLUCOSE SERPL-MCNC: 115 MG/DL (ref 70–99)
GLUCOSE UR STRIP.AUTO-MCNC: NEGATIVE MG/DL
HCG SERPL QL: NEGATIVE
HCT VFR BLD AUTO: 35.4 % (ref 36–48)
HGB BLD-MCNC: 11.6 G/DL (ref 12–16)
HGB UR QL STRIP.AUTO: ABNORMAL
INR PPP: 1.08 (ref 0.85–1.15)
KETONES UR STRIP.AUTO-MCNC: >=80 MG/DL
LACTATE BLDV-SCNC: 1.3 MMOL/L (ref 0.4–1.9)
LEUKOCYTE ESTERASE UR QL STRIP.AUTO: NEGATIVE
LIPASE SERPL-CCNC: 23 U/L (ref 13–60)
LYMPHOCYTES # BLD: 0.9 K/UL (ref 1–5.1)
LYMPHOCYTES NFR BLD: 12 %
MAGNESIUM SERPL-MCNC: 1.9 MG/DL (ref 1.8–2.4)
MCH RBC QN AUTO: 25.8 PG (ref 26–34)
MCHC RBC AUTO-ENTMCNC: 32.7 G/DL (ref 31–36)
MCV RBC AUTO: 78.8 FL (ref 80–100)
MONOCYTES # BLD: 0.6 K/UL (ref 0–1.3)
MONOCYTES NFR BLD: 7.5 %
MUCOUS THREADS #/AREA URNS LPF: ABNORMAL /LPF
NEUTROPHILS # BLD: 6.3 K/UL (ref 1.7–7.7)
NEUTROPHILS NFR BLD: 79.5 %
NITRITE UR QL STRIP.AUTO: NEGATIVE
PH UR STRIP.AUTO: 8 [PH] (ref 5–8)
PHOSPHATE SERPL-MCNC: 1.9 MG/DL (ref 2.5–4.9)
PLATELET # BLD AUTO: 218 K/UL (ref 135–450)
PMV BLD AUTO: 8.3 FL (ref 5–10.5)
POTASSIUM SERPL-SCNC: 3.7 MMOL/L (ref 3.5–5.1)
PROT SERPL-MCNC: 7.2 G/DL (ref 6.4–8.2)
PROT UR STRIP.AUTO-MCNC: NEGATIVE MG/DL
PROTHROMBIN TIME: 14.2 SEC (ref 11.9–14.9)
RBC # BLD AUTO: 4.5 M/UL (ref 4–5.2)
RBC #/AREA URNS HPF: ABNORMAL /HPF (ref 0–4)
SARS-COV-2 RNA RESP QL NAA+PROBE: NOT DETECTED
SODIUM SERPL-SCNC: 137 MMOL/L (ref 136–145)
SP GR UR STRIP.AUTO: 1.02 (ref 1–1.03)
TROPONIN, HIGH SENSITIVITY: 13 NG/L (ref 0–14)
UA COMPLETE W REFLEX CULTURE PNL UR: ABNORMAL
UA DIPSTICK W REFLEX MICRO PNL UR: YES
URN SPEC COLLECT METH UR: ABNORMAL
UROBILINOGEN UR STRIP-ACNC: 0.2 E.U./DL
WBC # BLD AUTO: 7.9 K/UL (ref 4–11)
WBC #/AREA URNS HPF: ABNORMAL /HPF (ref 0–5)

## 2024-11-22 PROCEDURE — 2500000003 HC RX 250 WO HCPCS

## 2024-11-22 PROCEDURE — 99285 EMERGENCY DEPT VISIT HI MDM: CPT

## 2024-11-22 PROCEDURE — G0378 HOSPITAL OBSERVATION PER HR: HCPCS

## 2024-11-22 PROCEDURE — 93005 ELECTROCARDIOGRAM TRACING: CPT

## 2024-11-22 PROCEDURE — 71045 X-RAY EXAM CHEST 1 VIEW: CPT

## 2024-11-22 PROCEDURE — 3077F SYST BP >= 140 MM HG: CPT | Performed by: SURGERY

## 2024-11-22 PROCEDURE — 83690 ASSAY OF LIPASE: CPT

## 2024-11-22 PROCEDURE — 81001 URINALYSIS AUTO W/SCOPE: CPT

## 2024-11-22 PROCEDURE — 87636 SARSCOV2 & INF A&B AMP PRB: CPT

## 2024-11-22 PROCEDURE — 96372 THER/PROPH/DIAG INJ SC/IM: CPT

## 2024-11-22 PROCEDURE — 6360000002 HC RX W HCPCS: Performed by: EMERGENCY MEDICINE

## 2024-11-22 PROCEDURE — 3080F DIAST BP >= 90 MM HG: CPT | Performed by: SURGERY

## 2024-11-22 PROCEDURE — 2580000003 HC RX 258: Performed by: INTERNAL MEDICINE

## 2024-11-22 PROCEDURE — 6370000000 HC RX 637 (ALT 250 FOR IP): Performed by: EMERGENCY MEDICINE

## 2024-11-22 PROCEDURE — 2580000003 HC RX 258

## 2024-11-22 PROCEDURE — 84100 ASSAY OF PHOSPHORUS: CPT

## 2024-11-22 PROCEDURE — 6360000002 HC RX W HCPCS

## 2024-11-22 PROCEDURE — 96375 TX/PRO/DX INJ NEW DRUG ADDON: CPT

## 2024-11-22 PROCEDURE — 99213 OFFICE O/P EST LOW 20 MIN: CPT | Performed by: SURGERY

## 2024-11-22 PROCEDURE — 85610 PROTHROMBIN TIME: CPT

## 2024-11-22 PROCEDURE — 96361 HYDRATE IV INFUSION ADD-ON: CPT

## 2024-11-22 PROCEDURE — 80053 COMPREHEN METABOLIC PANEL: CPT

## 2024-11-22 PROCEDURE — 84484 ASSAY OF TROPONIN QUANT: CPT

## 2024-11-22 PROCEDURE — 96374 THER/PROPH/DIAG INJ IV PUSH: CPT

## 2024-11-22 PROCEDURE — 36415 COLL VENOUS BLD VENIPUNCTURE: CPT

## 2024-11-22 PROCEDURE — 82550 ASSAY OF CK (CPK): CPT

## 2024-11-22 PROCEDURE — 83735 ASSAY OF MAGNESIUM: CPT

## 2024-11-22 PROCEDURE — 74176 CT ABD & PELVIS W/O CONTRAST: CPT

## 2024-11-22 PROCEDURE — 87040 BLOOD CULTURE FOR BACTERIA: CPT

## 2024-11-22 PROCEDURE — 84703 CHORIONIC GONADOTROPIN ASSAY: CPT

## 2024-11-22 PROCEDURE — 83605 ASSAY OF LACTIC ACID: CPT

## 2024-11-22 PROCEDURE — 70450 CT HEAD/BRAIN W/O DYE: CPT

## 2024-11-22 PROCEDURE — 85025 COMPLETE CBC W/AUTO DIFF WBC: CPT

## 2024-11-22 RX ORDER — POLYETHYLENE GLYCOL 3350 17 G/17G
17 POWDER, FOR SOLUTION ORAL DAILY PRN
Status: DISCONTINUED | OUTPATIENT
Start: 2024-11-22 | End: 2024-11-28 | Stop reason: HOSPADM

## 2024-11-22 RX ORDER — SODIUM CHLORIDE 9 MG/ML
INJECTION, SOLUTION INTRAVENOUS CONTINUOUS
Status: ACTIVE | OUTPATIENT
Start: 2024-11-22 | End: 2024-11-23

## 2024-11-22 RX ORDER — ONDANSETRON 2 MG/ML
4 INJECTION INTRAMUSCULAR; INTRAVENOUS ONCE
Status: COMPLETED | OUTPATIENT
Start: 2024-11-22 | End: 2024-11-22

## 2024-11-22 RX ORDER — PROCHLORPERAZINE EDISYLATE 5 MG/ML
10 INJECTION INTRAMUSCULAR; INTRAVENOUS ONCE
Status: COMPLETED | OUTPATIENT
Start: 2024-11-22 | End: 2024-11-22

## 2024-11-22 RX ORDER — KETOROLAC TROMETHAMINE 30 MG/ML
15 INJECTION, SOLUTION INTRAMUSCULAR; INTRAVENOUS ONCE
Status: COMPLETED | OUTPATIENT
Start: 2024-11-22 | End: 2024-11-22

## 2024-11-22 RX ORDER — POTASSIUM CHLORIDE 7.45 MG/ML
10 INJECTION INTRAVENOUS PRN
Status: DISCONTINUED | OUTPATIENT
Start: 2024-11-22 | End: 2024-11-27

## 2024-11-22 RX ORDER — 0.9 % SODIUM CHLORIDE 0.9 %
1000 INTRAVENOUS SOLUTION INTRAVENOUS ONCE
Status: COMPLETED | OUTPATIENT
Start: 2024-11-22 | End: 2024-11-22

## 2024-11-22 RX ORDER — IBUPROFEN 400 MG/1
800 TABLET, FILM COATED ORAL EVERY 8 HOURS PRN
Status: DISCONTINUED | OUTPATIENT
Start: 2024-11-22 | End: 2024-11-28 | Stop reason: HOSPADM

## 2024-11-22 RX ORDER — M-VIT,TX,IRON,MINS/CALC/FOLIC 27MG-0.4MG
1 TABLET ORAL DAILY
Status: DISCONTINUED | OUTPATIENT
Start: 2024-11-23 | End: 2024-11-28 | Stop reason: HOSPADM

## 2024-11-22 RX ORDER — DIPHENHYDRAMINE HYDROCHLORIDE 50 MG/ML
12.5 INJECTION INTRAMUSCULAR; INTRAVENOUS ONCE
Status: COMPLETED | OUTPATIENT
Start: 2024-11-22 | End: 2024-11-22

## 2024-11-22 RX ORDER — PROCHLORPERAZINE EDISYLATE 5 MG/ML
10 INJECTION INTRAMUSCULAR; INTRAVENOUS EVERY 6 HOURS PRN
Status: DISCONTINUED | OUTPATIENT
Start: 2024-11-22 | End: 2024-11-22

## 2024-11-22 RX ORDER — METOCLOPRAMIDE HYDROCHLORIDE 5 MG/ML
10 INJECTION INTRAMUSCULAR; INTRAVENOUS EVERY 6 HOURS
Status: DISCONTINUED | OUTPATIENT
Start: 2024-11-22 | End: 2024-11-22

## 2024-11-22 RX ORDER — MORPHINE SULFATE 2 MG/ML
2 INJECTION, SOLUTION INTRAMUSCULAR; INTRAVENOUS ONCE
Status: COMPLETED | OUTPATIENT
Start: 2024-11-22 | End: 2024-11-22

## 2024-11-22 RX ORDER — NITROGLYCERIN 0.4 MG/1
0.4 TABLET SUBLINGUAL EVERY 5 MIN PRN
Status: DISCONTINUED | OUTPATIENT
Start: 2024-11-22 | End: 2024-11-28 | Stop reason: HOSPADM

## 2024-11-22 RX ORDER — ACETAMINOPHEN 650 MG/1
650 SUPPOSITORY RECTAL EVERY 6 HOURS PRN
Status: DISCONTINUED | OUTPATIENT
Start: 2024-11-22 | End: 2024-11-28 | Stop reason: HOSPADM

## 2024-11-22 RX ORDER — ONDANSETRON 2 MG/ML
4 INJECTION INTRAMUSCULAR; INTRAVENOUS EVERY 6 HOURS PRN
Status: DISCONTINUED | OUTPATIENT
Start: 2024-11-22 | End: 2024-11-22

## 2024-11-22 RX ORDER — ACETAMINOPHEN 325 MG/1
650 TABLET ORAL EVERY 6 HOURS PRN
Status: DISCONTINUED | OUTPATIENT
Start: 2024-11-22 | End: 2024-11-28 | Stop reason: HOSPADM

## 2024-11-22 RX ORDER — ENOXAPARIN SODIUM 100 MG/ML
30 INJECTION SUBCUTANEOUS 2 TIMES DAILY
Status: DISCONTINUED | OUTPATIENT
Start: 2024-11-22 | End: 2024-11-28 | Stop reason: HOSPADM

## 2024-11-22 RX ORDER — SODIUM CHLORIDE 0.9 % (FLUSH) 0.9 %
5-40 SYRINGE (ML) INJECTION PRN
Status: DISCONTINUED | OUTPATIENT
Start: 2024-11-22 | End: 2024-11-27 | Stop reason: SDUPTHER

## 2024-11-22 RX ORDER — ACETAMINOPHEN 325 MG/1
975 TABLET ORAL EVERY 8 HOURS PRN
Status: DISCONTINUED | OUTPATIENT
Start: 2024-11-22 | End: 2024-11-22 | Stop reason: SDUPTHER

## 2024-11-22 RX ORDER — DEXTROMETHORPHAN HYDROBROMIDE AND PROMETHAZINE HYDROCHLORIDE 15; 6.25 MG/5ML; MG/5ML
5 SYRUP ORAL 4 TIMES DAILY PRN
Status: DISCONTINUED | OUTPATIENT
Start: 2024-11-22 | End: 2024-11-28 | Stop reason: HOSPADM

## 2024-11-22 RX ORDER — ONDANSETRON 4 MG/1
4 TABLET, ORALLY DISINTEGRATING ORAL EVERY 8 HOURS PRN
Status: DISCONTINUED | OUTPATIENT
Start: 2024-11-22 | End: 2024-11-28 | Stop reason: HOSPADM

## 2024-11-22 RX ORDER — MAGNESIUM HYDROXIDE/ALUMINUM HYDROXICE/SIMETHICONE 120; 1200; 1200 MG/30ML; MG/30ML; MG/30ML
30 SUSPENSION ORAL EVERY 6 HOURS PRN
Status: DISCONTINUED | OUTPATIENT
Start: 2024-11-22 | End: 2024-11-28 | Stop reason: HOSPADM

## 2024-11-22 RX ORDER — ONDANSETRON 2 MG/ML
4 INJECTION INTRAMUSCULAR; INTRAVENOUS EVERY 6 HOURS PRN
Status: DISCONTINUED | OUTPATIENT
Start: 2024-11-22 | End: 2024-11-28 | Stop reason: HOSPADM

## 2024-11-22 RX ORDER — LOSARTAN POTASSIUM 25 MG/1
100 TABLET ORAL DAILY
Status: DISCONTINUED | OUTPATIENT
Start: 2024-11-23 | End: 2024-11-28 | Stop reason: HOSPADM

## 2024-11-22 RX ORDER — LEVOTHYROXINE SODIUM 25 UG/1
75 TABLET ORAL DAILY
Status: DISCONTINUED | OUTPATIENT
Start: 2024-11-23 | End: 2024-11-28 | Stop reason: HOSPADM

## 2024-11-22 RX ORDER — ACETAMINOPHEN 325 MG/1
650 TABLET ORAL ONCE
Status: COMPLETED | OUTPATIENT
Start: 2024-11-22 | End: 2024-11-22

## 2024-11-22 RX ORDER — SODIUM CHLORIDE 0.9 % (FLUSH) 0.9 %
5-40 SYRINGE (ML) INJECTION EVERY 12 HOURS SCHEDULED
Status: DISCONTINUED | OUTPATIENT
Start: 2024-11-22 | End: 2024-11-27 | Stop reason: SDUPTHER

## 2024-11-22 RX ORDER — POTASSIUM CHLORIDE 1500 MG/1
40 TABLET, EXTENDED RELEASE ORAL PRN
Status: DISCONTINUED | OUTPATIENT
Start: 2024-11-22 | End: 2024-11-27

## 2024-11-22 RX ORDER — ASCORBIC ACID 500 MG
500 TABLET ORAL DAILY
Status: DISCONTINUED | OUTPATIENT
Start: 2024-11-23 | End: 2024-11-28 | Stop reason: HOSPADM

## 2024-11-22 RX ORDER — ROSUVASTATIN CALCIUM 10 MG/1
40 TABLET, COATED ORAL NIGHTLY
Status: DISCONTINUED | OUTPATIENT
Start: 2024-11-22 | End: 2024-11-28 | Stop reason: HOSPADM

## 2024-11-22 RX ORDER — ATENOLOL 25 MG/1
50 TABLET ORAL DAILY
Status: DISCONTINUED | OUTPATIENT
Start: 2024-11-23 | End: 2024-11-28 | Stop reason: HOSPADM

## 2024-11-22 RX ORDER — SCOLOPAMINE TRANSDERMAL SYSTEM 1 MG/1
1 PATCH, EXTENDED RELEASE TRANSDERMAL
Status: DISCONTINUED | OUTPATIENT
Start: 2024-11-24 | End: 2024-11-28 | Stop reason: HOSPADM

## 2024-11-22 RX ORDER — ASPIRIN 81 MG/1
81 TABLET ORAL DAILY
Status: DISCONTINUED | OUTPATIENT
Start: 2024-11-23 | End: 2024-11-28 | Stop reason: HOSPADM

## 2024-11-22 RX ORDER — VERAPAMIL HYDROCHLORIDE 240 MG/1
240 TABLET, FILM COATED, EXTENDED RELEASE ORAL DAILY
Status: DISCONTINUED | OUTPATIENT
Start: 2024-11-23 | End: 2024-11-28 | Stop reason: HOSPADM

## 2024-11-22 RX ORDER — SODIUM CHLORIDE 9 MG/ML
INJECTION, SOLUTION INTRAVENOUS PRN
Status: DISCONTINUED | OUTPATIENT
Start: 2024-11-22 | End: 2024-11-27 | Stop reason: SDUPTHER

## 2024-11-22 RX ADMIN — METOCLOPRAMIDE 10 MG: 5 INJECTION, SOLUTION INTRAMUSCULAR; INTRAVENOUS at 18:13

## 2024-11-22 RX ADMIN — KETOROLAC TROMETHAMINE 15 MG: 30 INJECTION, SOLUTION INTRAMUSCULAR at 18:13

## 2024-11-22 RX ADMIN — ONDANSETRON 4 MG: 2 INJECTION, SOLUTION INTRAMUSCULAR; INTRAVENOUS at 17:00

## 2024-11-22 RX ADMIN — FAMOTIDINE 20 MG: 10 INJECTION, SOLUTION INTRAVENOUS at 17:04

## 2024-11-22 RX ADMIN — SODIUM CHLORIDE: 9 INJECTION, SOLUTION INTRAVENOUS at 22:55

## 2024-11-22 RX ADMIN — MORPHINE SULFATE 2 MG: 2 INJECTION, SOLUTION INTRAMUSCULAR; INTRAVENOUS at 17:02

## 2024-11-22 RX ADMIN — ACETAMINOPHEN 650 MG: 325 TABLET ORAL at 17:04

## 2024-11-22 RX ADMIN — DIPHENHYDRAMINE HYDROCHLORIDE 12.5 MG: 50 INJECTION INTRAMUSCULAR; INTRAVENOUS at 18:13

## 2024-11-22 RX ADMIN — PROCHLORPERAZINE EDISYLATE 10 MG: 5 INJECTION INTRAMUSCULAR; INTRAVENOUS at 21:26

## 2024-11-22 RX ADMIN — SODIUM CHLORIDE 1000 ML: 9 INJECTION, SOLUTION INTRAVENOUS at 17:06

## 2024-11-22 SDOH — ECONOMIC STABILITY: FOOD INSECURITY: WITHIN THE PAST 12 MONTHS, THE FOOD YOU BOUGHT JUST DIDN'T LAST AND YOU DIDN'T HAVE MONEY TO GET MORE.: NEVER TRUE

## 2024-11-22 SDOH — ECONOMIC STABILITY: TRANSPORTATION INSECURITY
IN THE PAST 12 MONTHS, HAS THE LACK OF TRANSPORTATION KEPT YOU FROM MEDICAL APPOINTMENTS OR FROM GETTING MEDICATIONS?: NO

## 2024-11-22 SDOH — ECONOMIC STABILITY: FOOD INSECURITY: WITHIN THE PAST 12 MONTHS, YOU WORRIED THAT YOUR FOOD WOULD RUN OUT BEFORE YOU GOT MONEY TO BUY MORE.: NEVER TRUE

## 2024-11-22 SDOH — ECONOMIC STABILITY: INCOME INSECURITY: IN THE LAST 12 MONTHS, WAS THERE A TIME WHEN YOU WERE NOT ABLE TO PAY THE MORTGAGE OR RENT ON TIME?: NO

## 2024-11-22 SDOH — HEALTH STABILITY: MENTAL HEALTH: HOW OFTEN DO YOU HAVE A DRINK CONTAINING ALCOHOL?: NEVER

## 2024-11-22 SDOH — ECONOMIC STABILITY: INCOME INSECURITY: HOW HARD IS IT FOR YOU TO PAY FOR THE VERY BASICS LIKE FOOD, HOUSING, MEDICAL CARE, AND HEATING?: NOT HARD AT ALL

## 2024-11-22 SDOH — HEALTH STABILITY: MENTAL HEALTH: HOW MANY STANDARD DRINKS CONTAINING ALCOHOL DO YOU HAVE ON A TYPICAL DAY?: PATIENT DOES NOT DRINK

## 2024-11-22 ASSESSMENT — PAIN - FUNCTIONAL ASSESSMENT: PAIN_FUNCTIONAL_ASSESSMENT: 0-10

## 2024-11-22 ASSESSMENT — PAIN SCALES - GENERAL
PAINLEVEL_OUTOF10: 8
PAINLEVEL_OUTOF10: 5
PAINLEVEL_OUTOF10: 7
PAINLEVEL_OUTOF10: 9

## 2024-11-22 ASSESSMENT — PAIN DESCRIPTION - LOCATION: LOCATION: GENERALIZED

## 2024-11-22 ASSESSMENT — PAIN DESCRIPTION - DESCRIPTORS: DESCRIPTORS: ACHING

## 2024-11-22 NOTE — ED PROVIDER NOTES
THIS IS MY RESIDENT SUPERVISORY AND SHARED VISIT NOTE:    I personally saw the patient and made/approved the management plan and take responsibility for the patient management.    I independently performed a history and physical on Sussy Casillas.   All diagnostic, treatment, and disposition decisions were made by myself in conjunction with the resident physician. I personally saw the patient and performed a substantive portion of the visit including all aspects of the medical decision making.      For further details of Sussy Casillas's emergency department encounter, please see Denton Judd DO's documentation.      History: Patient is a 48-year-old female presenting today due to concern for vomiting along with fever and shortness of breath over the last couple of days.  She does also have a headache and it is one of the worst of her life although it has built up over time.  She complains about having muscle spasms to her arms and legs along with some pain in her back and throughout her breast area.  She does feel short of breath.  She denies any abnormal leg swelling or history of blood clots.  She has been coughing since Monday.  She also has a fever.  No reported falls or trauma or passing out.  She has been having issues with vomiting as well since Tuesday.  She denies any diarrhea.  She also has some abdominal discomfort.  She did try going to urgent care 2 days ago and was prescribed Zofran along with azithromycin but feels like she has worsened and therefore came to the emergency department for further evaluation.      Physical exam:   Gen: Moderate acute distress.  Alert and answering questions appropriately.  Ill-appearing, nontoxic  Pysch: Anxious mood and affect  HEENT: NCAT, PERRL, dry MM, pharyngeal erythema  Neck: supple, no nuchal rigidity although does complain about pain with neck movement  Cardiac: RRR, pulses 2+ in upper extremities  Lungs: C2AB, no R/R/W  Abdomen: soft and mild 
criteria for sepsis, but does not meet criteria for severe sepsis or septic shock      CLINICAL IMPRESSION:     ICD-10-CM    1. Dehydration  E86.0       2. Abdominal pain with vomiting  R10.9     R11.10       3. Intractable nausea  R11.0       4. Acute nonintractable headache, unspecified headache type  R51.9       5. Fever, unknown origin  R50.9       6. Acute cough  R05.1       7. Neck pain  M54.2             DISPOSITION:  I discussed the results, including any incidental findings, with patient and through shared decision making;   Disposition today from the ED will be: Admit to med/surg floor in fair condition.   Questions answered. They are agreeable to plan and express understanding of plan.     Social Determinants : None    _____________________________________    DISCHARGE MEDICATIONS (if applicable):  New Prescriptions    No medications on file       DISCONTINUED MEDICATIONS:  Discontinued Medications    No medications on file            DISPOSITION REFERRAL (if applicable):  No follow-up provider specified.    _____________________________________        This chart was generated in part by using Dragon Dictation system and may contain errors related to that system including errors in grammar, punctuation, and spelling, as well as words and phrases that may be inappropriate. If there are any questions or concerns please feel free to contact the dictating provider for clarification.     Denton Judd DO (electronically signed)   4:46 PM ASLHEIGH Judd D.O. PGY1  [facility]

## 2024-11-22 NOTE — PROGRESS NOTES
11/22/2024    This is a 48 y.o. female   Chief Complaint   Patient presents with    Fever    Vomiting     Seen at urgent care on Wednesday    .    Fever and vomiting since Wednesday, not able to tolerate water, sprite or gatorade, no food. Multiple episodes of emesis in office, stomach acid. Muscle cramps/amita horses legs, side, chest wall.              Patient Active Problem List   Diagnosis    Hypertension    CAD s/p NIKOLE to mid-LAD (Nov 2016)    Chronic mediastinal lymphadenopathy (noted Nov 2016)    Hyperlipidemia    Obesity (BMI 30-39.9)    Former smoker    Chronic fatigue    Acute non-recurrent frontal sinusitis    GERD (gastroesophageal reflux disease)    ABHILASH on CPAP    Contusion of right knee    Strain of left hamstring    Loose body in knee, left knee    Acute medial meniscus tear of left knee    S/P arthroscopic partial medial meniscectomy    Chronic pain of left knee    Effusion of left knee    Chest pain    Arthritis of knee, left    Arthritis of knee, right    Presence of stent in LAD coronary artery       Current Outpatient Medications   Medication Sig Dispense Refill    azithromycin (ZITHROMAX) 250 MG tablet Take 1 tablet by mouth daily 500mg on day 1 followed by 250mg on days 2 - 5 6 tablet 0    promethazine-dextromethorphan (PROMETHAZINE-DM) 6.25-15 MG/5ML syrup Take 5 mLs by mouth 4 times daily as needed for Cough 100 mL 0    scopolamine (TRANSDERM-SCOP) transdermal patch Place 1 patch onto the skin every 72 hours 4 patch 0    methylPREDNISolone (MEDROL DOSEPACK) 4 MG tablet Take by mouth as directed 1 kit 0    atenolol (TENORMIN) 50 MG tablet TAKE 1 TABLET BY MOUTH EVERY DAY 90 tablet 1    Tirzepatide-Weight Management (ZEPBOUND) 5 MG/0.5ML SOAJ Inject 5 mg into the skin once a week 2 mL 0    levothyroxine (SYNTHROID) 75 MCG tablet Take 1 tablet by mouth daily 90 tablet 3    rosuvastatin (CRESTOR) 40 MG tablet TAKE 1 TABLET BY MOUTH IN THE EVENING 90 tablet 3    verapamil (CALAN SR) 240 MG

## 2024-11-23 LAB
ANION GAP SERPL CALCULATED.3IONS-SCNC: 14 MMOL/L (ref 3–16)
BASOPHILS # BLD: 0.1 K/UL (ref 0–0.2)
BASOPHILS NFR BLD: 0.9 %
BUN SERPL-MCNC: 9 MG/DL (ref 7–20)
CALCIUM SERPL-MCNC: 8.3 MG/DL (ref 8.3–10.6)
CHLORIDE SERPL-SCNC: 101 MMOL/L (ref 99–110)
CO2 SERPL-SCNC: 22 MMOL/L (ref 21–32)
CREAT SERPL-MCNC: 0.7 MG/DL (ref 0.6–1.1)
DEPRECATED RDW RBC AUTO: 15 % (ref 12.4–15.4)
EKG ATRIAL RATE: 96 BPM
EKG DIAGNOSIS: NORMAL
EKG P AXIS: 65 DEGREES
EKG P-R INTERVAL: 134 MS
EKG Q-T INTERVAL: 328 MS
EKG QRS DURATION: 82 MS
EKG QTC CALCULATION (BAZETT): 414 MS
EKG R AXIS: 38 DEGREES
EKG T AXIS: 59 DEGREES
EKG VENTRICULAR RATE: 96 BPM
EOSINOPHIL # BLD: 0 K/UL (ref 0–0.6)
EOSINOPHIL NFR BLD: 0.7 %
GFR SERPLBLD CREATININE-BSD FMLA CKD-EPI: >90 ML/MIN/{1.73_M2}
GLUCOSE SERPL-MCNC: 83 MG/DL (ref 70–99)
HCT VFR BLD AUTO: 33.6 % (ref 36–48)
HGB BLD-MCNC: 10.9 G/DL (ref 12–16)
LYMPHOCYTES # BLD: 1.4 K/UL (ref 1–5.1)
LYMPHOCYTES NFR BLD: 21.7 %
MAGNESIUM SERPL-MCNC: 1.93 MG/DL (ref 1.8–2.4)
MCH RBC QN AUTO: 25.9 PG (ref 26–34)
MCHC RBC AUTO-ENTMCNC: 32.3 G/DL (ref 31–36)
MCV RBC AUTO: 80 FL (ref 80–100)
MONOCYTES # BLD: 0.6 K/UL (ref 0–1.3)
MONOCYTES NFR BLD: 9.6 %
NEUTROPHILS # BLD: 4.4 K/UL (ref 1.7–7.7)
NEUTROPHILS NFR BLD: 67.1 %
PLATELET # BLD AUTO: 198 K/UL (ref 135–450)
PMV BLD AUTO: 8.5 FL (ref 5–10.5)
POTASSIUM SERPL-SCNC: 3.4 MMOL/L (ref 3.5–5.1)
POTASSIUM SERPL-SCNC: 4.1 MMOL/L (ref 3.5–5.1)
RBC # BLD AUTO: 4.2 M/UL (ref 4–5.2)
SODIUM SERPL-SCNC: 137 MMOL/L (ref 136–145)
WBC # BLD AUTO: 6.5 K/UL (ref 4–11)

## 2024-11-23 PROCEDURE — 80048 BASIC METABOLIC PNL TOTAL CA: CPT

## 2024-11-23 PROCEDURE — 85025 COMPLETE CBC W/AUTO DIFF WBC: CPT

## 2024-11-23 PROCEDURE — 6360000002 HC RX W HCPCS: Performed by: INTERNAL MEDICINE

## 2024-11-23 PROCEDURE — 6370000000 HC RX 637 (ALT 250 FOR IP): Performed by: INTERNAL MEDICINE

## 2024-11-23 PROCEDURE — 84132 ASSAY OF SERUM POTASSIUM: CPT

## 2024-11-23 PROCEDURE — G0378 HOSPITAL OBSERVATION PER HR: HCPCS

## 2024-11-23 PROCEDURE — 6370000000 HC RX 637 (ALT 250 FOR IP): Performed by: STUDENT IN AN ORGANIZED HEALTH CARE EDUCATION/TRAINING PROGRAM

## 2024-11-23 PROCEDURE — 93010 ELECTROCARDIOGRAM REPORT: CPT | Performed by: INTERNAL MEDICINE

## 2024-11-23 PROCEDURE — 6360000002 HC RX W HCPCS

## 2024-11-23 PROCEDURE — 2580000003 HC RX 258: Performed by: INTERNAL MEDICINE

## 2024-11-23 PROCEDURE — 83735 ASSAY OF MAGNESIUM: CPT

## 2024-11-23 PROCEDURE — 36415 COLL VENOUS BLD VENIPUNCTURE: CPT

## 2024-11-23 RX ORDER — HYDRALAZINE HYDROCHLORIDE 20 MG/ML
10 INJECTION INTRAMUSCULAR; INTRAVENOUS EVERY 8 HOURS PRN
Status: DISCONTINUED | OUTPATIENT
Start: 2024-11-23 | End: 2024-11-28 | Stop reason: HOSPADM

## 2024-11-23 RX ORDER — PROCHLORPERAZINE EDISYLATE 5 MG/ML
10 INJECTION INTRAMUSCULAR; INTRAVENOUS ONCE
Status: COMPLETED | OUTPATIENT
Start: 2024-11-23 | End: 2024-11-23

## 2024-11-23 RX ORDER — KETOROLAC TROMETHAMINE 30 MG/ML
15 INJECTION, SOLUTION INTRAMUSCULAR; INTRAVENOUS ONCE
Status: COMPLETED | OUTPATIENT
Start: 2024-11-23 | End: 2024-11-23

## 2024-11-23 RX ORDER — DIPHENHYDRAMINE HYDROCHLORIDE 50 MG/ML
12.5 INJECTION INTRAMUSCULAR; INTRAVENOUS ONCE
Status: COMPLETED | OUTPATIENT
Start: 2024-11-23 | End: 2024-11-23

## 2024-11-23 RX ORDER — SUMATRIPTAN SUCCINATE 25 MG/1
50 TABLET ORAL ONCE
Status: COMPLETED | OUTPATIENT
Start: 2024-11-23 | End: 2024-11-23

## 2024-11-23 RX ADMIN — VERAPAMIL HYDROCHLORIDE 240 MG: 240 TABLET, FILM COATED, EXTENDED RELEASE ORAL at 21:19

## 2024-11-23 RX ADMIN — DIPHENHYDRAMINE HYDROCHLORIDE 12.5 MG: 50 INJECTION INTRAMUSCULAR; INTRAVENOUS at 10:38

## 2024-11-23 RX ADMIN — SODIUM CHLORIDE: 9 INJECTION, SOLUTION INTRAVENOUS at 21:14

## 2024-11-23 RX ADMIN — ENOXAPARIN SODIUM 30 MG: 100 INJECTION SUBCUTANEOUS at 21:23

## 2024-11-23 RX ADMIN — LEVOTHYROXINE SODIUM 75 MCG: 0.03 TABLET ORAL at 06:51

## 2024-11-23 RX ADMIN — SUMATRIPTAN SUCCINATE 50 MG: 25 TABLET ORAL at 16:13

## 2024-11-23 RX ADMIN — ACETAMINOPHEN 650 MG: 325 TABLET ORAL at 19:41

## 2024-11-23 RX ADMIN — POTASSIUM CHLORIDE 10 MEQ: 7.46 INJECTION, SOLUTION INTRAVENOUS at 09:54

## 2024-11-23 RX ADMIN — POTASSIUM CHLORIDE 10 MEQ: 7.46 INJECTION, SOLUTION INTRAVENOUS at 12:53

## 2024-11-23 RX ADMIN — PROCHLORPERAZINE EDISYLATE 10 MG: 5 INJECTION INTRAMUSCULAR; INTRAVENOUS at 10:38

## 2024-11-23 RX ADMIN — ROSUVASTATIN CALCIUM 40 MG: 10 TABLET, FILM COATED ORAL at 21:19

## 2024-11-23 RX ADMIN — ENOXAPARIN SODIUM 30 MG: 100 INJECTION SUBCUTANEOUS at 08:25

## 2024-11-23 RX ADMIN — ONDANSETRON 4 MG: 2 INJECTION INTRAMUSCULAR; INTRAVENOUS at 08:25

## 2024-11-23 RX ADMIN — ACETAMINOPHEN 650 MG: 325 TABLET ORAL at 02:07

## 2024-11-23 RX ADMIN — KETOROLAC TROMETHAMINE 15 MG: 30 INJECTION, SOLUTION INTRAMUSCULAR at 10:38

## 2024-11-23 RX ADMIN — SODIUM CHLORIDE, PRESERVATIVE FREE 10 ML: 5 INJECTION INTRAVENOUS at 08:29

## 2024-11-23 RX ADMIN — ONDANSETRON 4 MG: 2 INJECTION INTRAMUSCULAR; INTRAVENOUS at 18:43

## 2024-11-23 RX ADMIN — SODIUM CHLORIDE: 9 INJECTION, SOLUTION INTRAVENOUS at 07:07

## 2024-11-23 ASSESSMENT — PAIN DESCRIPTION - DESCRIPTORS
DESCRIPTORS_2: ACHING;PRESSURE
DESCRIPTORS: PRESSURE
DESCRIPTORS: ACHING

## 2024-11-23 ASSESSMENT — PAIN DESCRIPTION - LOCATION
LOCATION_2: NECK
LOCATION: HEAD

## 2024-11-23 ASSESSMENT — PAIN DESCRIPTION - INTENSITY: RATING_2: 7

## 2024-11-23 ASSESSMENT — PAIN SCALES - GENERAL
PAINLEVEL_OUTOF10: 5
PAINLEVEL_OUTOF10: 5
PAINLEVEL_OUTOF10: 7
PAINLEVEL_OUTOF10: 4
PAINLEVEL_OUTOF10: 7
PAINLEVEL_OUTOF10: 7

## 2024-11-23 ASSESSMENT — PAIN DESCRIPTION - ORIENTATION
ORIENTATION: POSTERIOR;LOWER
ORIENTATION_2: UPPER;MID

## 2024-11-23 NOTE — PLAN OF CARE
Problem: Pain  Goal: Verbalizes/displays adequate comfort level or baseline comfort level  Outcome: Progressing  Flowsheets (Taken 11/23/2024 0822)  Verbalizes/displays adequate comfort level or baseline comfort level:   Encourage patient to monitor pain and request assistance   Assess pain using appropriate pain scale   Administer analgesics based on type and severity of pain and evaluate response   Implement non-pharmacological measures as appropriate and evaluate response   Consider cultural and social influences on pain and pain management   Notify Licensed Independent Practitioner if interventions unsuccessful or patient reports new pain     Problem: Safety - Adult  Goal: Free from fall injury  Outcome: Progressing  Flowsheets (Taken 11/23/2024 1503)  Free From Fall Injury: Instruct family/caregiver on patient safety     Problem: Skin/Tissue Integrity  Goal: Absence of new skin breakdown  Description: 1.  Monitor for areas of redness and/or skin breakdown  2.  Assess vascular access sites hourly  3.  Every 4-6 hours minimum:  Change oxygen saturation probe site  4.  Every 4-6 hours:  If on nasal continuous positive airway pressure, respiratory therapy assess nares and determine need for appliance change or resting period.  Outcome: Progressing     Problem: Gastrointestinal - Adult  Goal: Minimal or absence of nausea and vomiting  Outcome: Progressing  Flowsheets (Taken 11/23/2024 1503)  Minimal or absence of nausea and vomiting:   Administer IV fluids as ordered to ensure adequate hydration   Administer ordered antiemetic medications as needed   Provide nonpharmacologic comfort measures as appropriate  Goal: Maintains adequate nutritional intake  Outcome: Progressing  Flowsheets (Taken 11/23/2024 1503)  Maintains adequate nutritional intake: Identify factors contributing to decreased intake, treat as appropriate

## 2024-11-23 NOTE — H&P
stenting and bypass: Patient states she has been unable to tolerate her medications.  Will restart Tenormin, aspirin, Cozaar.  Also Crestor    Hypothyroidism: Resume Synthroid    Essential hypertension: Resume home meds including verapamil, losartan, Tenormin    Hypophosphatemia, replace per protocol    Discussed management and the need for Hospitalization of the patient w/ the Emergency Department Provider: Yes    CXR: I have reviewed the CXR with the following interpretation: No acute finding  EKG:  I have reviewed the EKG with the following interpretation: Normal sinus rhythm    Physical Exam Performed:      BP (!) 145/82   Pulse 82   Temp 98.1 °F (36.7 °C) (Oral)   Resp 18   Ht 1.676 m (5' 5.98\")   Wt 107.5 kg (237 lb)   SpO2 96%   BMI 38.27 kg/m²     General appearance:  No apparent distress, appears stated age and cooperative.  HEENT:  Pupils equal, round, and reactive to light. Conjunctivae/corneas clear.  Respiratory:  Normal respiratory effort. Clear to auscultation bilaterally without Rales/Wheezes/Rhonchi.  Cardiovascular:  Regular rate and rhythm with normal S1/S2 without murmurs, rubs or gallops.  Abdomen:  Soft, non-tender, non-distended with normal bowel sounds.  Musculoskeletal:  No clubbing, cyanosis or edema bilaterally.  Full range of motion without deformity.  Neurologic:  Neurovascularly intact without any focal sensory/motor deficits. Cranial nerves: II-XII intact, grossly non-focal.  Psychiatric:  Alert and oriented, thought content appropriate, normal insight  Skin:  Skin color, texture, turgor normal.  No rashes or lesions.  Capillary Refill:  Brisk,< 3 seconds   Peripheral Pulses:  +2 palpable, equal bilaterally     Diet: [x]Adult/General  []Cardiac  []Diabetic  []Low Fat  []NPO  []NPO after Midnight  []Other   DVT Prophylaxis: []PPx LMWH  []SQ Heparin  []IPC/SCDs  []Eliquis  []Xarelto  []Coumadin     Code status: [x]Full  []DNR/CCA  []Limited (DNR/CCA with Do Not Intubate)

## 2024-11-23 NOTE — ED NOTES
344 @ 4998 tele  
Attempting PO challenge w/ pretzels and ice water.   
Dr. Haro at bedside for PIV placement at this time.  
Pt unable to complete PO challenge after compazine given per MAR. \"I'm not even willing to try the water.\" MD made aware.   
Abnormal; Notable for the following components:       Result Value    Hemoglobin 11.6 (*)     Hematocrit 35.4 (*)     MCV 78.8 (*)     MCH 25.8 (*)     Lymphocytes Absolute 0.9 (*)     All other components within normal limits   COMPREHENSIVE METABOLIC PANEL W/ REFLEX TO MG FOR LOW K - Abnormal; Notable for the following components:    Chloride 98 (*)     Glucose 115 (*)     AST 39 (*)     All other components within normal limits   PHOSPHORUS - Abnormal; Notable for the following components:    Phosphorus 1.9 (*)     All other components within normal limits   URINALYSIS WITH REFLEX TO CULTURE - Abnormal; Notable for the following components:    Ketones, Urine >=80 (*)     Blood, Urine TRACE-INTACT (*)     All other components within normal limits   MICROSCOPIC URINALYSIS - Abnormal; Notable for the following components:    Mucus, UA Rare (*)     RBC, UA 5-10 (*)     Bacteria, UA Rare (*)     All other components within normal limits     Critical values: no  Intervention for critical value(s):     Abnormal Imaging: no             You may also review the ED PT Care Timeline found under the Summary Tab, ED Encounter Summary, Timeline Reports, ED Patient Care Timeline.     Recommendation    Pending orders/Uncompleted orders to hand off:      Additional Comments:   If any further questions, please call Sending RN at [phone]

## 2024-11-24 ENCOUNTER — ANCILLARY PROCEDURE (OUTPATIENT)
Dept: EMERGENCY DEPT | Age: 49
DRG: 392 | End: 2024-11-24
Attending: EMERGENCY MEDICINE
Payer: COMMERCIAL

## 2024-11-24 LAB
ANION GAP SERPL CALCULATED.3IONS-SCNC: 16 MMOL/L (ref 3–16)
BASOPHILS # BLD: 0 K/UL (ref 0–0.2)
BASOPHILS NFR BLD: 0.7 %
BUN SERPL-MCNC: 7 MG/DL (ref 7–20)
CALCIUM SERPL-MCNC: 8.6 MG/DL (ref 8.3–10.6)
CHLORIDE SERPL-SCNC: 99 MMOL/L (ref 99–110)
CO2 SERPL-SCNC: 20 MMOL/L (ref 21–32)
CREAT SERPL-MCNC: 0.7 MG/DL (ref 0.6–1.1)
DEPRECATED RDW RBC AUTO: 15.2 % (ref 12.4–15.4)
EOSINOPHIL # BLD: 0.1 K/UL (ref 0–0.6)
EOSINOPHIL NFR BLD: 0.9 %
GFR SERPLBLD CREATININE-BSD FMLA CKD-EPI: >90 ML/MIN/{1.73_M2}
GLUCOSE BLD-MCNC: 82 MG/DL (ref 70–99)
GLUCOSE SERPL-MCNC: 85 MG/DL (ref 70–99)
HCT VFR BLD AUTO: 33.2 % (ref 36–48)
HGB BLD-MCNC: 10.8 G/DL (ref 12–16)
LACTATE BLDV-SCNC: 1.2 MMOL/L (ref 0.4–2)
LYMPHOCYTES # BLD: 2.2 K/UL (ref 1–5.1)
LYMPHOCYTES NFR BLD: 28.8 %
MCH RBC QN AUTO: 25.7 PG (ref 26–34)
MCHC RBC AUTO-ENTMCNC: 32.4 G/DL (ref 31–36)
MCV RBC AUTO: 79.3 FL (ref 80–100)
MONOCYTES # BLD: 0.6 K/UL (ref 0–1.3)
MONOCYTES NFR BLD: 8.5 %
NEUTROPHILS # BLD: 4.6 K/UL (ref 1.7–7.7)
NEUTROPHILS NFR BLD: 61.1 %
PERFORMED ON: NORMAL
PLATELET # BLD AUTO: 214 K/UL (ref 135–450)
PMV BLD AUTO: 8.4 FL (ref 5–10.5)
POTASSIUM SERPL-SCNC: 3.8 MMOL/L (ref 3.5–5.1)
RBC # BLD AUTO: 4.18 M/UL (ref 4–5.2)
SODIUM SERPL-SCNC: 135 MMOL/L (ref 136–145)
WBC # BLD AUTO: 7.5 K/UL (ref 4–11)

## 2024-11-24 PROCEDURE — 6360000002 HC RX W HCPCS: Performed by: NURSE PRACTITIONER

## 2024-11-24 PROCEDURE — 6360000002 HC RX W HCPCS

## 2024-11-24 PROCEDURE — 83605 ASSAY OF LACTIC ACID: CPT

## 2024-11-24 PROCEDURE — 2580000003 HC RX 258

## 2024-11-24 PROCEDURE — 6370000000 HC RX 637 (ALT 250 FOR IP): Performed by: INTERNAL MEDICINE

## 2024-11-24 PROCEDURE — 36415 COLL VENOUS BLD VENIPUNCTURE: CPT

## 2024-11-24 PROCEDURE — 76937 US GUIDE VASCULAR ACCESS: CPT

## 2024-11-24 PROCEDURE — 85025 COMPLETE CBC W/AUTO DIFF WBC: CPT

## 2024-11-24 PROCEDURE — 80048 BASIC METABOLIC PNL TOTAL CA: CPT

## 2024-11-24 PROCEDURE — G0378 HOSPITAL OBSERVATION PER HR: HCPCS

## 2024-11-24 PROCEDURE — 6360000002 HC RX W HCPCS: Performed by: INTERNAL MEDICINE

## 2024-11-24 PROCEDURE — 2580000003 HC RX 258: Performed by: INTERNAL MEDICINE

## 2024-11-24 PROCEDURE — 2500000003 HC RX 250 WO HCPCS: Performed by: NURSE PRACTITIONER

## 2024-11-24 RX ORDER — PROCHLORPERAZINE EDISYLATE 5 MG/ML
10 INJECTION INTRAMUSCULAR; INTRAVENOUS EVERY 6 HOURS PRN
Status: DISCONTINUED | OUTPATIENT
Start: 2024-11-24 | End: 2024-11-28 | Stop reason: HOSPADM

## 2024-11-24 RX ORDER — PROCHLORPERAZINE EDISYLATE 5 MG/ML
10 INJECTION INTRAMUSCULAR; INTRAVENOUS EVERY 8 HOURS PRN
Status: DISCONTINUED | OUTPATIENT
Start: 2024-11-24 | End: 2024-11-24 | Stop reason: SDUPTHER

## 2024-11-24 RX ORDER — 0.9 % SODIUM CHLORIDE 0.9 %
500 INTRAVENOUS SOLUTION INTRAVENOUS ONCE
Status: COMPLETED | OUTPATIENT
Start: 2024-11-24 | End: 2024-11-24

## 2024-11-24 RX ORDER — APREPITANT 40 MG/1
40 CAPSULE ORAL ONCE
Status: COMPLETED | OUTPATIENT
Start: 2024-11-24 | End: 2024-11-24

## 2024-11-24 RX ORDER — LORAZEPAM 2 MG/ML
1 INJECTION INTRAMUSCULAR
Status: COMPLETED | OUTPATIENT
Start: 2024-11-24 | End: 2024-11-25

## 2024-11-24 RX ORDER — PROCHLORPERAZINE EDISYLATE 5 MG/ML
10 INJECTION INTRAMUSCULAR; INTRAVENOUS ONCE
Status: COMPLETED | OUTPATIENT
Start: 2024-11-24 | End: 2024-11-24

## 2024-11-24 RX ORDER — METOPROLOL TARTRATE 1 MG/ML
5 INJECTION, SOLUTION INTRAVENOUS ONCE
Status: COMPLETED | OUTPATIENT
Start: 2024-11-24 | End: 2024-11-24

## 2024-11-24 RX ORDER — DIPHENHYDRAMINE HYDROCHLORIDE 50 MG/ML
25 INJECTION INTRAMUSCULAR; INTRAVENOUS ONCE
Status: COMPLETED | OUTPATIENT
Start: 2024-11-24 | End: 2024-11-25

## 2024-11-24 RX ORDER — KETOROLAC TROMETHAMINE 30 MG/ML
30 INJECTION, SOLUTION INTRAMUSCULAR; INTRAVENOUS ONCE
Status: COMPLETED | OUTPATIENT
Start: 2024-11-24 | End: 2024-11-24

## 2024-11-24 RX ORDER — PROCHLORPERAZINE EDISYLATE 5 MG/ML
10 INJECTION INTRAMUSCULAR; INTRAVENOUS ONCE
Status: COMPLETED | OUTPATIENT
Start: 2024-11-24 | End: 2024-11-25

## 2024-11-24 RX ORDER — KETOROLAC TROMETHAMINE 30 MG/ML
15 INJECTION, SOLUTION INTRAMUSCULAR; INTRAVENOUS ONCE
Status: COMPLETED | OUTPATIENT
Start: 2024-11-24 | End: 2024-11-25

## 2024-11-24 RX ORDER — DIPHENHYDRAMINE HYDROCHLORIDE 50 MG/ML
25 INJECTION INTRAMUSCULAR; INTRAVENOUS ONCE
Status: COMPLETED | OUTPATIENT
Start: 2024-11-24 | End: 2024-11-24

## 2024-11-24 RX ADMIN — HYDRALAZINE HYDROCHLORIDE 10 MG: 20 INJECTION, SOLUTION INTRAMUSCULAR; INTRAVENOUS at 19:33

## 2024-11-24 RX ADMIN — ONDANSETRON 4 MG: 2 INJECTION INTRAMUSCULAR; INTRAVENOUS at 10:13

## 2024-11-24 RX ADMIN — SODIUM CHLORIDE 500 ML: 9 INJECTION, SOLUTION INTRAVENOUS at 13:13

## 2024-11-24 RX ADMIN — PROCHLORPERAZINE EDISYLATE 10 MG: 5 INJECTION INTRAMUSCULAR; INTRAVENOUS at 03:46

## 2024-11-24 RX ADMIN — ENOXAPARIN SODIUM 30 MG: 100 INJECTION SUBCUTANEOUS at 08:45

## 2024-11-24 RX ADMIN — SODIUM CHLORIDE, PRESERVATIVE FREE 10 ML: 5 INJECTION INTRAVENOUS at 20:34

## 2024-11-24 RX ADMIN — PROCHLORPERAZINE EDISYLATE 10 MG: 5 INJECTION INTRAMUSCULAR; INTRAVENOUS at 13:05

## 2024-11-24 RX ADMIN — ONDANSETRON 4 MG: 2 INJECTION INTRAMUSCULAR; INTRAVENOUS at 18:17

## 2024-11-24 RX ADMIN — KETOROLAC TROMETHAMINE 30 MG: 30 INJECTION, SOLUTION INTRAMUSCULAR at 13:05

## 2024-11-24 RX ADMIN — ONDANSETRON 4 MG: 2 INJECTION INTRAMUSCULAR; INTRAVENOUS at 01:56

## 2024-11-24 RX ADMIN — IBUPROFEN 800 MG: 400 TABLET, FILM COATED ORAL at 06:23

## 2024-11-24 RX ADMIN — ENOXAPARIN SODIUM 30 MG: 100 INJECTION SUBCUTANEOUS at 20:34

## 2024-11-24 RX ADMIN — Medication 10 ML: at 19:33

## 2024-11-24 RX ADMIN — SODIUM CHLORIDE, PRESERVATIVE FREE 10 ML: 5 INJECTION INTRAVENOUS at 10:13

## 2024-11-24 RX ADMIN — ACETAMINOPHEN 650 MG: 650 SUPPOSITORY RECTAL at 20:35

## 2024-11-24 RX ADMIN — PROCHLORPERAZINE EDISYLATE 10 MG: 5 INJECTION INTRAMUSCULAR; INTRAVENOUS at 20:34

## 2024-11-24 RX ADMIN — METOPROLOL TARTRATE 5 MG: 5 INJECTION INTRAVENOUS at 22:22

## 2024-11-24 RX ADMIN — APREPITANT 40 MG: 40 CAPSULE ORAL at 15:51

## 2024-11-24 RX ADMIN — DIPHENHYDRAMINE HYDROCHLORIDE 25 MG: 50 INJECTION INTRAMUSCULAR; INTRAVENOUS at 13:05

## 2024-11-24 ASSESSMENT — PAIN DESCRIPTION - ONSET: ONSET: ON-GOING

## 2024-11-24 ASSESSMENT — PAIN DESCRIPTION - PAIN TYPE: TYPE: ACUTE PAIN

## 2024-11-24 ASSESSMENT — PAIN DESCRIPTION - FREQUENCY: FREQUENCY: CONTINUOUS

## 2024-11-24 ASSESSMENT — PAIN - FUNCTIONAL ASSESSMENT: PAIN_FUNCTIONAL_ASSESSMENT: PREVENTS OR INTERFERES SOME ACTIVE ACTIVITIES AND ADLS

## 2024-11-24 ASSESSMENT — PAIN SCALES - GENERAL
PAINLEVEL_OUTOF10: 5
PAINLEVEL_OUTOF10: 6

## 2024-11-24 ASSESSMENT — PAIN SCALES - WONG BAKER: WONGBAKER_NUMERICALRESPONSE: NO HURT

## 2024-11-24 ASSESSMENT — PAIN DESCRIPTION - LOCATION
LOCATION: HEAD
LOCATION: HEAD

## 2024-11-24 ASSESSMENT — PAIN DESCRIPTION - DESCRIPTORS
DESCRIPTORS: ACHING
DESCRIPTORS: PRESSURE

## 2024-11-24 NOTE — PLAN OF CARE
Problem: Pain  Goal: Verbalizes/displays adequate comfort level or baseline comfort level  11/23/2024 2152 by Gracia Bagley RN  Outcome: Progressing     Problem: Safety - Adult  Goal: Free from fall injury  11/23/2024 2152 by Gracia Bagley RN  Outcome: Progressing  Flowsheets (Taken 11/23/2024 2151)  Free From Fall Injury: Instruct family/caregiver on patient safety     Problem: Skin/Tissue Integrity  Goal: Absence of new skin breakdown  Description: 1.  Monitor for areas of redness and/or skin breakdown  2.  Assess vascular access sites hourly  3.  Every 4-6 hours minimum:  Change oxygen saturation probe site  4.  Every 4-6 hours:  If on nasal continuous positive airway pressure, respiratory therapy assess nares and determine need for appliance change or resting period.  11/23/2024 2152 by Gracia Bagley, RN  Outcome: Progressing     Problem: Gastrointestinal - Adult  Goal: Minimal or absence of nausea and vomiting  11/23/2024 2152 by Gracia Bagley RN  Outcome: Progressing     Problem: Gastrointestinal - Adult  Goal: Maintains or returns to baseline bowel function  Outcome: Progressing     Problem: Gastrointestinal - Adult  Goal: Maintains adequate nutritional intake  11/23/2024 2152 by Gracia Bagley RN  Outcome: Progressing     Problem: ABCDS Injury Assessment  Goal: Absence of physical injury  Outcome: Progressing     Continue with plan of care.

## 2024-11-24 NOTE — PLAN OF CARE
Problem: Gastrointestinal - Adult  Goal: Maintains adequate nutritional intake  11/24/2024 1030 by Emeli Espinoza RN  Outcome: Not Progressing  Flowsheets (Taken 11/23/2024 1503)  Maintains adequate nutritional intake: Identify factors contributing to decreased intake, treat as appropriate  Problem: Pain  Goal: Verbalizes/displays adequate comfort level or baseline comfort level  11/24/2024 1030 by Emeli Espinoza RN  Outcome: Progressing  Flowsheets (Taken 11/23/2024 0822)  Verbalizes/displays adequate comfort level or baseline comfort level:   Encourage patient to monitor pain and request assistance   Assess pain using appropriate pain scale   Administer analgesics based on type and severity of pain and evaluate response   Implement non-pharmacological measures as appropriate and evaluate response   Consider cultural and social influences on pain and pain management   Notify Licensed Independent Practitioner if interventions unsuccessful or patient reports new pain  11/23/2024 2152 by Gracia Bagley RN  Outcome: Progressing     Problem: Safety - Adult  Goal: Free from fall injury  11/24/2024 1030 by Emeli Espinoza RN  Outcome: Progressing  Flowsheets (Taken 11/23/2024 2151 by Gracia Bagley RN)  Free From Fall Injury: Instruct family/caregiver on patient safety  11/23/2024 2152 by Gracia Bagley RN  Outcome: Progressing  Flowsheets (Taken 11/23/2024 2151)  Free From Fall Injury: Instruct family/caregiver on patient safety     Problem: Skin/Tissue Integrity  Goal: Absence of new skin breakdown  Description: 1.  Monitor for areas of redness and/or skin breakdown  2.  Assess vascular access sites hourly  3.  Every 4-6 hours minimum:  Change oxygen saturation probe site  4.  Every 4-6 hours:  If on nasal continuous positive airway pressure, respiratory therapy assess nares and determine need for appliance change or resting period.  11/24/2024 1030 by Emeli Espinoza RN  Outcome: Progressing  11/23/2024 2152 by Kevyn

## 2024-11-25 ENCOUNTER — APPOINTMENT (OUTPATIENT)
Dept: MRI IMAGING | Age: 49
DRG: 392 | End: 2024-11-25
Payer: COMMERCIAL

## 2024-11-25 LAB
ANION GAP SERPL CALCULATED.3IONS-SCNC: 16 MMOL/L (ref 3–16)
BASOPHILS # BLD: 0 K/UL (ref 0–0.2)
BASOPHILS NFR BLD: 0.8 %
BUN SERPL-MCNC: 11 MG/DL (ref 7–20)
CALCIUM SERPL-MCNC: 9.1 MG/DL (ref 8.3–10.6)
CHLORIDE SERPL-SCNC: 98 MMOL/L (ref 99–110)
CO2 SERPL-SCNC: 21 MMOL/L (ref 21–32)
CREAT SERPL-MCNC: 0.7 MG/DL (ref 0.6–1.1)
DEPRECATED RDW RBC AUTO: 14.8 % (ref 12.4–15.4)
EOSINOPHIL # BLD: 0.1 K/UL (ref 0–0.6)
EOSINOPHIL NFR BLD: 1.7 %
GFR SERPLBLD CREATININE-BSD FMLA CKD-EPI: >90 ML/MIN/{1.73_M2}
GLUCOSE SERPL-MCNC: 84 MG/DL (ref 70–99)
HCT VFR BLD AUTO: 36.7 % (ref 36–48)
HGB BLD-MCNC: 12.1 G/DL (ref 12–16)
LYMPHOCYTES # BLD: 2 K/UL (ref 1–5.1)
LYMPHOCYTES NFR BLD: 33.4 %
MCH RBC QN AUTO: 26.2 PG (ref 26–34)
MCHC RBC AUTO-ENTMCNC: 33 G/DL (ref 31–36)
MCV RBC AUTO: 79.4 FL (ref 80–100)
MONOCYTES # BLD: 0.6 K/UL (ref 0–1.3)
MONOCYTES NFR BLD: 10.3 %
NEUTROPHILS # BLD: 3.2 K/UL (ref 1.7–7.7)
NEUTROPHILS NFR BLD: 53.8 %
PLATELET # BLD AUTO: 233 K/UL (ref 135–450)
PMV BLD AUTO: 8.1 FL (ref 5–10.5)
POTASSIUM SERPL-SCNC: 3.6 MMOL/L (ref 3.5–5.1)
RBC # BLD AUTO: 4.62 M/UL (ref 4–5.2)
SODIUM SERPL-SCNC: 135 MMOL/L (ref 136–145)
WBC # BLD AUTO: 5.9 K/UL (ref 4–11)

## 2024-11-25 PROCEDURE — G0378 HOSPITAL OBSERVATION PER HR: HCPCS

## 2024-11-25 PROCEDURE — 70551 MRI BRAIN STEM W/O DYE: CPT

## 2024-11-25 PROCEDURE — 85025 COMPLETE CBC W/AUTO DIFF WBC: CPT

## 2024-11-25 PROCEDURE — 6360000002 HC RX W HCPCS

## 2024-11-25 PROCEDURE — 6360000002 HC RX W HCPCS: Performed by: NURSE PRACTITIONER

## 2024-11-25 PROCEDURE — 2580000003 HC RX 258: Performed by: INTERNAL MEDICINE

## 2024-11-25 PROCEDURE — 36415 COLL VENOUS BLD VENIPUNCTURE: CPT

## 2024-11-25 PROCEDURE — 80048 BASIC METABOLIC PNL TOTAL CA: CPT

## 2024-11-25 PROCEDURE — 6370000000 HC RX 637 (ALT 250 FOR IP): Performed by: INTERNAL MEDICINE

## 2024-11-25 PROCEDURE — 6360000002 HC RX W HCPCS: Performed by: INTERNAL MEDICINE

## 2024-11-25 RX ORDER — KETOROLAC TROMETHAMINE 30 MG/ML
15 INJECTION, SOLUTION INTRAMUSCULAR; INTRAVENOUS ONCE
Status: COMPLETED | OUTPATIENT
Start: 2024-11-25 | End: 2024-11-25

## 2024-11-25 RX ORDER — PROCHLORPERAZINE EDISYLATE 5 MG/ML
10 INJECTION INTRAMUSCULAR; INTRAVENOUS ONCE
Status: DISCONTINUED | OUTPATIENT
Start: 2024-11-25 | End: 2024-11-28 | Stop reason: HOSPADM

## 2024-11-25 RX ORDER — DIPHENHYDRAMINE HYDROCHLORIDE 50 MG/ML
12.5 INJECTION INTRAMUSCULAR; INTRAVENOUS ONCE
Status: COMPLETED | OUTPATIENT
Start: 2024-11-25 | End: 2024-11-25

## 2024-11-25 RX ORDER — CYCLOBENZAPRINE HCL 10 MG
5 TABLET ORAL 3 TIMES DAILY PRN
Status: DISCONTINUED | OUTPATIENT
Start: 2024-11-25 | End: 2024-11-28 | Stop reason: HOSPADM

## 2024-11-25 RX ADMIN — PROCHLORPERAZINE EDISYLATE 10 MG: 5 INJECTION INTRAMUSCULAR; INTRAVENOUS at 13:45

## 2024-11-25 RX ADMIN — DIPHENHYDRAMINE HYDROCHLORIDE 12.5 MG: 50 INJECTION INTRAMUSCULAR; INTRAVENOUS at 13:45

## 2024-11-25 RX ADMIN — ASPIRIN 81 MG: 81 TABLET, COATED ORAL at 10:46

## 2024-11-25 RX ADMIN — KETOROLAC TROMETHAMINE 15 MG: 30 INJECTION, SOLUTION INTRAMUSCULAR at 00:41

## 2024-11-25 RX ADMIN — ROSUVASTATIN CALCIUM 40 MG: 10 TABLET, FILM COATED ORAL at 21:00

## 2024-11-25 RX ADMIN — LEVOTHYROXINE SODIUM 75 MCG: 0.03 TABLET ORAL at 06:28

## 2024-11-25 RX ADMIN — PROCHLORPERAZINE EDISYLATE 10 MG: 5 INJECTION INTRAMUSCULAR; INTRAVENOUS at 00:40

## 2024-11-25 RX ADMIN — ACETAMINOPHEN 650 MG: 325 TABLET ORAL at 19:09

## 2024-11-25 RX ADMIN — ACETAMINOPHEN 650 MG: 325 TABLET ORAL at 06:30

## 2024-11-25 RX ADMIN — ENOXAPARIN SODIUM 30 MG: 100 INJECTION SUBCUTANEOUS at 10:47

## 2024-11-25 RX ADMIN — SODIUM CHLORIDE, PRESERVATIVE FREE 10 ML: 5 INJECTION INTRAVENOUS at 13:47

## 2024-11-25 RX ADMIN — SODIUM CHLORIDE, PRESERVATIVE FREE 10 ML: 5 INJECTION INTRAVENOUS at 21:01

## 2024-11-25 RX ADMIN — ATENOLOL 50 MG: 25 TABLET ORAL at 10:46

## 2024-11-25 RX ADMIN — VERAPAMIL HYDROCHLORIDE 240 MG: 240 TABLET, FILM COATED, EXTENDED RELEASE ORAL at 21:00

## 2024-11-25 RX ADMIN — KETOROLAC TROMETHAMINE 15 MG: 30 INJECTION, SOLUTION INTRAMUSCULAR at 13:46

## 2024-11-25 RX ADMIN — Medication 10 ML: at 00:41

## 2024-11-25 RX ADMIN — ONDANSETRON 4 MG: 2 INJECTION INTRAMUSCULAR; INTRAVENOUS at 23:27

## 2024-11-25 RX ADMIN — DIPHENHYDRAMINE HYDROCHLORIDE 25 MG: 50 INJECTION INTRAMUSCULAR; INTRAVENOUS at 00:40

## 2024-11-25 RX ADMIN — LOSARTAN POTASSIUM 100 MG: 25 TABLET, FILM COATED ORAL at 10:46

## 2024-11-25 RX ADMIN — LORAZEPAM 1 MG: 2 INJECTION INTRAMUSCULAR; INTRAVENOUS at 09:49

## 2024-11-25 RX ADMIN — PROMETHAZINE HYDROCHLORIDE AND DEXTROMETHORPHAN HYDROBROMIDE ORAL SOLUTION 5 ML: 15; 6.25 SOLUTION ORAL at 19:09

## 2024-11-25 RX ADMIN — ENOXAPARIN SODIUM 30 MG: 100 INJECTION SUBCUTANEOUS at 21:00

## 2024-11-25 ASSESSMENT — PAIN DESCRIPTION - ORIENTATION
ORIENTATION: POSTERIOR

## 2024-11-25 ASSESSMENT — PAIN DESCRIPTION - DESCRIPTORS
DESCRIPTORS: DULL;ACHING
DESCRIPTORS: ACHING
DESCRIPTORS: ACHING
DESCRIPTORS: ACHING;DISCOMFORT;THROBBING
DESCRIPTORS: ACHING;DULL

## 2024-11-25 ASSESSMENT — PAIN DESCRIPTION - LOCATION
LOCATION: HEAD

## 2024-11-25 ASSESSMENT — PAIN SCALES - GENERAL
PAINLEVEL_OUTOF10: 5
PAINLEVEL_OUTOF10: 6
PAINLEVEL_OUTOF10: 5
PAINLEVEL_OUTOF10: 5
PAINLEVEL_OUTOF10: 3

## 2024-11-25 ASSESSMENT — PAIN DESCRIPTION - PAIN TYPE
TYPE: ACUTE PAIN

## 2024-11-25 NOTE — PLAN OF CARE
Problem: Pain  Goal: Verbalizes/displays adequate comfort level or baseline comfort level  11/25/2024 1424 by Bettina Mcguire RN  Outcome: Not Progressing  Flowsheets (Taken 11/25/2024 1424)  Verbalizes/displays adequate comfort level or baseline comfort level:   Encourage patient to monitor pain and request assistance   Assess pain using appropriate pain scale   Administer analgesics based on type and severity of pain and evaluate response    Problem: Safety - Adult  Goal: Free from fall injury  11/25/2024 1424 by Bettina Mcguire RN  Outcome: Progressing  Flowsheets (Taken 11/25/2024 1424)  Free From Fall Injury:   Instruct family/caregiver on patient safety   Based on caregiver fall risk screen, instruct family/caregiver to ask for assistance with transferring infant if caregiver noted to have fall risk factors    Problem: Gastrointestinal - Adult  Goal: Minimal or absence of nausea and vomiting  11/25/2024 1424 by Bettina Mcguire, RN  Outcome: Not Progressing  Flowsheets (Taken 11/25/2024 1424)  Minimal or absence of nausea and vomiting:   Administer IV fluids as ordered to ensure adequate hydration   Maintain NPO status until nausea and vomiting are resolved   Administer ordered antiemetic medications as needed   Provide nonpharmacologic comfort measures as appropriate

## 2024-11-25 NOTE — PLAN OF CARE
Problem: Pain  Goal: Verbalizes/displays adequate comfort level or baseline comfort level  Outcome: Progressing     Problem: Safety - Adult  Goal: Free from fall injury  Outcome: Progressing     Problem: Gastrointestinal - Adult  Goal: Minimal or absence of nausea and vomiting  Outcome: Progressing  Goal: Maintains or returns to baseline bowel function  Outcome: Progressing  Goal: Maintains adequate nutritional intake  Outcome: Progressing     Problem: ABCDS Injury Assessment  Goal: Absence of physical injury  Outcome: Progressing

## 2024-11-26 ENCOUNTER — APPOINTMENT (OUTPATIENT)
Dept: GENERAL RADIOLOGY | Age: 49
DRG: 392 | End: 2024-11-26
Payer: COMMERCIAL

## 2024-11-26 LAB
ANION GAP SERPL CALCULATED.3IONS-SCNC: 15 MMOL/L (ref 3–16)
BACTERIA BLD CULT ORG #2: NORMAL
BACTERIA BLD CULT: NORMAL
BASOPHILS # BLD: 0 K/UL (ref 0–0.2)
BASOPHILS NFR BLD: 0.7 %
BUN SERPL-MCNC: 13 MG/DL (ref 7–20)
CALCIUM SERPL-MCNC: 9.1 MG/DL (ref 8.3–10.6)
CHLORIDE SERPL-SCNC: 101 MMOL/L (ref 99–110)
CO2 SERPL-SCNC: 22 MMOL/L (ref 21–32)
CREAT SERPL-MCNC: 0.9 MG/DL (ref 0.6–1.1)
DEPRECATED RDW RBC AUTO: 15.2 % (ref 12.4–15.4)
EOSINOPHIL # BLD: 0.1 K/UL (ref 0–0.6)
EOSINOPHIL NFR BLD: 1.3 %
GFR SERPLBLD CREATININE-BSD FMLA CKD-EPI: 77 ML/MIN/{1.73_M2}
GLUCOSE SERPL-MCNC: 102 MG/DL (ref 70–99)
HCT VFR BLD AUTO: 35.3 % (ref 36–48)
HGB BLD-MCNC: 11.6 G/DL (ref 12–16)
LYMPHOCYTES # BLD: 2.2 K/UL (ref 1–5.1)
LYMPHOCYTES NFR BLD: 32.7 %
MCH RBC QN AUTO: 25.7 PG (ref 26–34)
MCHC RBC AUTO-ENTMCNC: 32.8 G/DL (ref 31–36)
MCV RBC AUTO: 78.4 FL (ref 80–100)
MONOCYTES # BLD: 0.6 K/UL (ref 0–1.3)
MONOCYTES NFR BLD: 9.7 %
NEUTROPHILS # BLD: 3.7 K/UL (ref 1.7–7.7)
NEUTROPHILS NFR BLD: 55.6 %
PLATELET # BLD AUTO: 255 K/UL (ref 135–450)
PMV BLD AUTO: 8.4 FL (ref 5–10.5)
POTASSIUM SERPL-SCNC: 3.7 MMOL/L (ref 3.5–5.1)
RBC # BLD AUTO: 4.5 M/UL (ref 4–5.2)
SODIUM SERPL-SCNC: 138 MMOL/L (ref 136–145)
WBC # BLD AUTO: 6.6 K/UL (ref 4–11)

## 2024-11-26 PROCEDURE — 71046 X-RAY EXAM CHEST 2 VIEWS: CPT

## 2024-11-26 PROCEDURE — 86038 ANTINUCLEAR ANTIBODIES: CPT

## 2024-11-26 PROCEDURE — 85025 COMPLETE CBC W/AUTO DIFF WBC: CPT

## 2024-11-26 PROCEDURE — 2580000003 HC RX 258: Performed by: INTERNAL MEDICINE

## 2024-11-26 PROCEDURE — 6360000002 HC RX W HCPCS: Performed by: INTERNAL MEDICINE

## 2024-11-26 PROCEDURE — 6360000002 HC RX W HCPCS: Performed by: STUDENT IN AN ORGANIZED HEALTH CARE EDUCATION/TRAINING PROGRAM

## 2024-11-26 PROCEDURE — 36415 COLL VENOUS BLD VENIPUNCTURE: CPT

## 2024-11-26 PROCEDURE — 6360000002 HC RX W HCPCS: Performed by: NURSE PRACTITIONER

## 2024-11-26 PROCEDURE — 6370000000 HC RX 637 (ALT 250 FOR IP): Performed by: INTERNAL MEDICINE

## 2024-11-26 PROCEDURE — 80048 BASIC METABOLIC PNL TOTAL CA: CPT

## 2024-11-26 PROCEDURE — 1200000000 HC SEMI PRIVATE

## 2024-11-26 RX ORDER — BENZONATATE 100 MG/1
100 CAPSULE ORAL 3 TIMES DAILY PRN
Status: DISCONTINUED | OUTPATIENT
Start: 2024-11-26 | End: 2024-11-28 | Stop reason: HOSPADM

## 2024-11-26 RX ORDER — PANTOPRAZOLE SODIUM 40 MG/1
40 TABLET, DELAYED RELEASE ORAL
Status: DISCONTINUED | OUTPATIENT
Start: 2024-11-27 | End: 2024-11-28 | Stop reason: HOSPADM

## 2024-11-26 RX ORDER — DRONABINOL 5 MG/1
5 CAPSULE ORAL 2 TIMES DAILY
Status: DISCONTINUED | OUTPATIENT
Start: 2024-11-26 | End: 2024-11-28 | Stop reason: HOSPADM

## 2024-11-26 RX ORDER — GUAIFENESIN 600 MG/1
600 TABLET, EXTENDED RELEASE ORAL 2 TIMES DAILY
Status: DISCONTINUED | OUTPATIENT
Start: 2024-11-26 | End: 2024-11-28 | Stop reason: HOSPADM

## 2024-11-26 RX ORDER — DRONABINOL 2.5 MG/1
5 CAPSULE ORAL 2 TIMES DAILY
Status: DISCONTINUED | OUTPATIENT
Start: 2024-11-26 | End: 2024-11-26 | Stop reason: SDUPTHER

## 2024-11-26 RX ADMIN — Medication 1 TABLET: at 10:29

## 2024-11-26 RX ADMIN — PROCHLORPERAZINE EDISYLATE 10 MG: 5 INJECTION INTRAMUSCULAR; INTRAVENOUS at 03:15

## 2024-11-26 RX ADMIN — CHOLECALCIFEROL (VITAMIN D3) 10 MCG (400 UNIT) TABLET 800 UNITS: at 10:29

## 2024-11-26 RX ADMIN — SODIUM CHLORIDE, PRESERVATIVE FREE 10 ML: 5 INJECTION INTRAVENOUS at 11:53

## 2024-11-26 RX ADMIN — OXYCODONE HYDROCHLORIDE AND ACETAMINOPHEN 500 MG: 500 TABLET ORAL at 10:29

## 2024-11-26 RX ADMIN — GUAIFENESIN 600 MG: 600 TABLET ORAL at 11:53

## 2024-11-26 RX ADMIN — BENZONATATE 100 MG: 100 CAPSULE ORAL at 10:29

## 2024-11-26 RX ADMIN — VERAPAMIL HYDROCHLORIDE 240 MG: 240 TABLET, FILM COATED, EXTENDED RELEASE ORAL at 20:36

## 2024-11-26 RX ADMIN — ACETAMINOPHEN 650 MG: 325 TABLET ORAL at 21:13

## 2024-11-26 RX ADMIN — SODIUM CHLORIDE, PRESERVATIVE FREE 10 ML: 5 INJECTION INTRAVENOUS at 20:37

## 2024-11-26 RX ADMIN — ENOXAPARIN SODIUM 30 MG: 100 INJECTION SUBCUTANEOUS at 20:37

## 2024-11-26 RX ADMIN — DRONABINOL 5 MG: 5 CAPSULE ORAL at 20:36

## 2024-11-26 RX ADMIN — ENOXAPARIN SODIUM 30 MG: 100 INJECTION SUBCUTANEOUS at 10:29

## 2024-11-26 RX ADMIN — CYCLOBENZAPRINE 5 MG: 10 TABLET, FILM COATED ORAL at 14:33

## 2024-11-26 RX ADMIN — ASPIRIN 81 MG: 81 TABLET, COATED ORAL at 10:28

## 2024-11-26 RX ADMIN — LEVOTHYROXINE SODIUM 75 MCG: 0.03 TABLET ORAL at 05:25

## 2024-11-26 RX ADMIN — DRONABINOL 5 MG: 5 CAPSULE ORAL at 11:53

## 2024-11-26 RX ADMIN — PROMETHAZINE HYDROCHLORIDE AND DEXTROMETHORPHAN HYDROBROMIDE ORAL SOLUTION 5 ML: 15; 6.25 SOLUTION ORAL at 05:25

## 2024-11-26 RX ADMIN — GUAIFENESIN 600 MG: 600 TABLET ORAL at 20:36

## 2024-11-26 RX ADMIN — ATENOLOL 50 MG: 25 TABLET ORAL at 10:29

## 2024-11-26 RX ADMIN — LOSARTAN POTASSIUM 100 MG: 25 TABLET, FILM COATED ORAL at 10:28

## 2024-11-26 RX ADMIN — ACETAMINOPHEN 650 MG: 325 TABLET ORAL at 03:26

## 2024-11-26 RX ADMIN — ACETAMINOPHEN 650 MG: 325 TABLET ORAL at 14:33

## 2024-11-26 RX ADMIN — ROSUVASTATIN CALCIUM 40 MG: 10 TABLET, FILM COATED ORAL at 20:36

## 2024-11-26 NOTE — CARE COORDINATION
Case Management Assessment  Initial Evaluation    Date/Time of Evaluation: 11/26/2024 10:51 AM  Assessment Completed by: Anthony Hi RN    If patient is discharged prior to next notation, then this note serves as note for discharge by case management.    Patient Name: Sussy Casillas                   YOB: 1975  Diagnosis: Dehydration [E86.0]  Neck pain [M54.2]  Fever, unknown origin [R50.9]  Intractable nausea and vomiting [R11.2]  Acute nonintractable headache, unspecified headache type [R51.9]  Abdominal pain with vomiting [R10.9, R11.10]  Acute cough [R05.1]  Intractable nausea [R11.0]                   Date / Time: 11/22/2024  3:42 PM    Patient Admission Status: Inpatient   Readmission Risk (Low < 19, Mod (19-27), High > 27): No data recorded  Current PCP: Bettina Patel APRN - CNP  PCP verified by CM? Yes    Chart Reviewed: Yes      History Provided by: Patient  Patient Orientation: Alert and Oriented, Person, Place, Situation, Self    Patient Cognition:      Hospitalization in the last 30 days (Readmission):  No    If yes, Readmission Assessment in CM Navigator will be completed.    Advance Directives:      Code Status: Full Code   Patient's Primary Decision Maker is: Legal Next of Kin    Primary Decision Maker: VinnyLise - Child - [phone]    Secondary Decision Maker: VinnyEli - Parent - [phone]    Discharge Planning:    Patient lives with: Parent Type of Home: Apartment  Primary Care Giver: Self  Patient Support Systems include: Parent   Current Financial resources: Medicaid  Current community resources: None  Current services prior to admission: None            Current DME:              Type of Home Care services:  None    ADLS  Prior functional level: Independent in ADLs/IADLs  Current functional level: Independent in ADLs/IADLs    PT AM-PAC:   /24  OT AM-PAC:   /24    Family can provide assistance at DC: Yes  Would you like Case Management to discuss the

## 2024-11-26 NOTE — PLAN OF CARE
Problem: Safety - Adult  Goal: Free from fall injury  11/25/2024 2102 by Arjun Cornejo RN  Outcome: Progressing  Flowsheets (Taken 11/25/2024 1424 by Bettina Mcguire, RN)  Free From Fall Injury:   Instruct family/caregiver on patient safety   Based on caregiver fall risk screen, instruct family/caregiver to ask for assistance with transferring infant if caregiver noted to have fall risk factors     Problem: Skin/Tissue Integrity  Goal: Absence of new skin breakdown  Description: 1.  Monitor for areas of redness and/or skin breakdown  2.  Assess vascular access sites hourly  3.  Every 4-6 hours minimum:  Change oxygen saturation probe site  4.  Every 4-6 hours:  If on nasal continuous positive airway pressure, respiratory therapy assess nares and determine need for appliance change or resting period.  Outcome: Progressing     Problem: ABCDS Injury Assessment  Goal: Absence of physical injury  Outcome: Progressing  Flowsheets (Taken 11/24/2024 1030 by Emeli Espinoza, RN)  Absence of Physical Injury: Implement safety measures based on patient assessment     Problem: Pain  Goal: Verbalizes/displays adequate comfort level or baseline comfort level  11/25/2024 2102 by Arjun Cornejo RN  Outcome: Not Progressing  Flowsheets (Taken 11/25/2024 1424 by Bettina Mcguire, RN)  Verbalizes/displays adequate comfort level or baseline comfort level:   Encourage patient to monitor pain and request assistance   Assess pain using appropriate pain scale   Administer analgesics based on type and severity of pain and evaluate response  11/25/2024 1424 by Bettina Mcguire RN  Outcome: Not Progressing  Flowsheets (Taken 11/25/2024 1424)  Verbalizes/displays adequate comfort level or baseline comfort level:   Encourage patient to monitor pain and request assistance   Assess pain using appropriate pain scale   Administer analgesics based on type and severity of pain and evaluate response     Problem: Gastrointestinal - Adult  Goal:

## 2024-11-26 NOTE — PLAN OF CARE
Problem: Pain  Goal: Verbalizes/displays adequate comfort level or baseline comfort level  Outcome: Progressing  Flowsheets (Taken 11/26/2024 1818)  Verbalizes/displays adequate comfort level or baseline comfort level:   Encourage patient to monitor pain and request assistance   Assess pain using appropriate pain scale   Administer analgesics based on type and severity of pain and evaluate response   Implement non-pharmacological measures as appropriate and evaluate response     Problem: Safety - Adult  Goal: Free from fall injury  Outcome: Progressing  Flowsheets (Taken 11/26/2024 1818)  Free From Fall Injury:   Instruct family/caregiver on patient safety   Based on caregiver fall risk screen, instruct family/caregiver to ask for assistance with transferring infant if caregiver noted to have fall risk factors     Problem: Gastrointestinal - Adult  Goal: Minimal or absence of nausea and vomiting  Outcome: Progressing  Flowsheets (Taken 11/26/2024 1818)  Minimal or absence of nausea and vomiting:   Nasogastric tube to low intermittent suction as ordered   Administer ordered antiemetic medications as needed   Provide nonpharmacologic comfort measures as appropriate

## 2024-11-27 LAB
ALBUMIN SERPL-MCNC: 3.6 G/DL (ref 3.4–5)
ALP SERPL-CCNC: 116 U/L (ref 40–129)
ALT SERPL-CCNC: 59 U/L (ref 10–40)
ANA SER QL IA: NEGATIVE
ANION GAP SERPL CALCULATED.3IONS-SCNC: 13 MMOL/L (ref 3–16)
AST SERPL-CCNC: 64 U/L (ref 15–37)
BASOPHILS # BLD: 0.1 K/UL (ref 0–0.2)
BASOPHILS NFR BLD: 0.8 %
BILIRUB DIRECT SERPL-MCNC: <0.1 MG/DL (ref 0–0.3)
BILIRUB INDIRECT SERPL-MCNC: 0.2 MG/DL (ref 0–1)
BILIRUB SERPL-MCNC: 0.3 MG/DL (ref 0–1)
BUN SERPL-MCNC: 16 MG/DL (ref 7–20)
CALCIUM SERPL-MCNC: 9 MG/DL (ref 8.3–10.6)
CHLORIDE SERPL-SCNC: 99 MMOL/L (ref 99–110)
CK SERPL-CCNC: 58 U/L (ref 26–192)
CO2 SERPL-SCNC: 23 MMOL/L (ref 21–32)
CREAT SERPL-MCNC: 1.3 MG/DL (ref 0.6–1.1)
CRP SERPL-MCNC: 32.7 MG/L (ref 0–5.1)
DEPRECATED RDW RBC AUTO: 15 % (ref 12.4–15.4)
EOSINOPHIL # BLD: 0.1 K/UL (ref 0–0.6)
EOSINOPHIL NFR BLD: 2.1 %
ERYTHROCYTE [SEDIMENTATION RATE] IN BLOOD BY WESTERGREN METHOD: 66 MM/HR (ref 0–30)
GFR SERPLBLD CREATININE-BSD FMLA CKD-EPI: 50 ML/MIN/{1.73_M2}
GLUCOSE SERPL-MCNC: 105 MG/DL (ref 70–99)
HCT VFR BLD AUTO: 35.7 % (ref 36–48)
HGB BLD-MCNC: 11.7 G/DL (ref 12–16)
LYMPHOCYTES # BLD: 2 K/UL (ref 1–5.1)
LYMPHOCYTES NFR BLD: 30.9 %
MAGNESIUM SERPL-MCNC: 2.12 MG/DL (ref 1.8–2.4)
MCH RBC QN AUTO: 25.8 PG (ref 26–34)
MCHC RBC AUTO-ENTMCNC: 32.8 G/DL (ref 31–36)
MCV RBC AUTO: 78.7 FL (ref 80–100)
MONOCYTES # BLD: 0.7 K/UL (ref 0–1.3)
MONOCYTES NFR BLD: 11.7 %
NEUTROPHILS # BLD: 3.4 K/UL (ref 1.7–7.7)
NEUTROPHILS NFR BLD: 54.5 %
PLATELET # BLD AUTO: 236 K/UL (ref 135–450)
PMV BLD AUTO: 8.7 FL (ref 5–10.5)
POTASSIUM SERPL-SCNC: 3.3 MMOL/L (ref 3.5–5.1)
PROT SERPL-MCNC: 7 G/DL (ref 6.4–8.2)
RBC # BLD AUTO: 4.54 M/UL (ref 4–5.2)
SODIUM SERPL-SCNC: 135 MMOL/L (ref 136–145)
WBC # BLD AUTO: 6.3 K/UL (ref 4–11)

## 2024-11-27 PROCEDURE — 6370000000 HC RX 637 (ALT 250 FOR IP): Performed by: INTERNAL MEDICINE

## 2024-11-27 PROCEDURE — 86665 EPSTEIN-BARR CAPSID VCA: CPT

## 2024-11-27 PROCEDURE — 86663 EPSTEIN-BARR ANTIBODY: CPT

## 2024-11-27 PROCEDURE — 82530 CORTISOL FREE: CPT

## 2024-11-27 PROCEDURE — 1200000000 HC SEMI PRIVATE

## 2024-11-27 PROCEDURE — 82550 ASSAY OF CK (CPK): CPT

## 2024-11-27 PROCEDURE — 83735 ASSAY OF MAGNESIUM: CPT

## 2024-11-27 PROCEDURE — 85652 RBC SED RATE AUTOMATED: CPT

## 2024-11-27 PROCEDURE — 99223 1ST HOSP IP/OBS HIGH 75: CPT | Performed by: INTERNAL MEDICINE

## 2024-11-27 PROCEDURE — 86140 C-REACTIVE PROTEIN: CPT

## 2024-11-27 PROCEDURE — 6360000002 HC RX W HCPCS: Performed by: STUDENT IN AN ORGANIZED HEALTH CARE EDUCATION/TRAINING PROGRAM

## 2024-11-27 PROCEDURE — 36415 COLL VENOUS BLD VENIPUNCTURE: CPT

## 2024-11-27 PROCEDURE — 80048 BASIC METABOLIC PNL TOTAL CA: CPT

## 2024-11-27 PROCEDURE — 05HC33Z INSERTION OF INFUSION DEVICE INTO LEFT BASILIC VEIN, PERCUTANEOUS APPROACH: ICD-10-PCS | Performed by: STUDENT IN AN ORGANIZED HEALTH CARE EDUCATION/TRAINING PROGRAM

## 2024-11-27 PROCEDURE — 36569 INSJ PICC 5 YR+ W/O IMAGING: CPT

## 2024-11-27 PROCEDURE — C1751 CATH, INF, PER/CENT/MIDLINE: HCPCS

## 2024-11-27 PROCEDURE — 76937 US GUIDE VASCULAR ACCESS: CPT

## 2024-11-27 PROCEDURE — 80076 HEPATIC FUNCTION PANEL: CPT

## 2024-11-27 PROCEDURE — 6360000002 HC RX W HCPCS: Performed by: INTERNAL MEDICINE

## 2024-11-27 PROCEDURE — 6370000000 HC RX 637 (ALT 250 FOR IP)

## 2024-11-27 PROCEDURE — 85025 COMPLETE CBC W/AUTO DIFF WBC: CPT

## 2024-11-27 PROCEDURE — 86664 EPSTEIN-BARR NUCLEAR ANTIGEN: CPT

## 2024-11-27 PROCEDURE — 2580000003 HC RX 258

## 2024-11-27 PROCEDURE — 86645 CMV ANTIBODY IGM: CPT

## 2024-11-27 RX ORDER — POTASSIUM CHLORIDE 1500 MG/1
40 TABLET, EXTENDED RELEASE ORAL ONCE
Status: COMPLETED | OUTPATIENT
Start: 2024-11-27 | End: 2024-11-27

## 2024-11-27 RX ORDER — SODIUM CHLORIDE 0.9 % (FLUSH) 0.9 %
5-40 SYRINGE (ML) INJECTION EVERY 12 HOURS SCHEDULED
Status: DISCONTINUED | OUTPATIENT
Start: 2024-11-27 | End: 2024-11-28 | Stop reason: HOSPADM

## 2024-11-27 RX ORDER — SODIUM CHLORIDE 0.9 % (FLUSH) 0.9 %
5-40 SYRINGE (ML) INJECTION PRN
Status: DISCONTINUED | OUTPATIENT
Start: 2024-11-27 | End: 2024-11-28 | Stop reason: HOSPADM

## 2024-11-27 RX ORDER — 0.9 % SODIUM CHLORIDE 0.9 %
500 INTRAVENOUS SOLUTION INTRAVENOUS ONCE
Status: COMPLETED | OUTPATIENT
Start: 2024-11-27 | End: 2024-11-27

## 2024-11-27 RX ORDER — LIDOCAINE HYDROCHLORIDE 10 MG/ML
50 INJECTION, SOLUTION INFILTRATION; PERINEURAL ONCE
Status: DISCONTINUED | OUTPATIENT
Start: 2024-11-27 | End: 2024-11-28 | Stop reason: HOSPADM

## 2024-11-27 RX ORDER — SODIUM CHLORIDE 9 MG/ML
INJECTION, SOLUTION INTRAVENOUS PRN
Status: DISCONTINUED | OUTPATIENT
Start: 2024-11-27 | End: 2024-11-28 | Stop reason: HOSPADM

## 2024-11-27 RX ADMIN — DRONABINOL 5 MG: 5 CAPSULE ORAL at 20:46

## 2024-11-27 RX ADMIN — SODIUM CHLORIDE, PRESERVATIVE FREE 10 ML: 5 INJECTION INTRAVENOUS at 17:31

## 2024-11-27 RX ADMIN — POTASSIUM CHLORIDE 40 MEQ: 1500 TABLET, EXTENDED RELEASE ORAL at 09:26

## 2024-11-27 RX ADMIN — OXYCODONE HYDROCHLORIDE AND ACETAMINOPHEN 500 MG: 500 TABLET ORAL at 09:27

## 2024-11-27 RX ADMIN — LOSARTAN POTASSIUM 100 MG: 25 TABLET, FILM COATED ORAL at 09:26

## 2024-11-27 RX ADMIN — SODIUM CHLORIDE 500 ML: 9 INJECTION, SOLUTION INTRAVENOUS at 17:31

## 2024-11-27 RX ADMIN — ROSUVASTATIN CALCIUM 40 MG: 10 TABLET, FILM COATED ORAL at 20:46

## 2024-11-27 RX ADMIN — GUAIFENESIN 600 MG: 600 TABLET ORAL at 20:46

## 2024-11-27 RX ADMIN — DRONABINOL 5 MG: 5 CAPSULE ORAL at 09:27

## 2024-11-27 RX ADMIN — PROMETHAZINE HYDROCHLORIDE AND DEXTROMETHORPHAN HYDROBROMIDE ORAL SOLUTION 5 ML: 15; 6.25 SOLUTION ORAL at 17:45

## 2024-11-27 RX ADMIN — IBUPROFEN 800 MG: 400 TABLET, FILM COATED ORAL at 18:47

## 2024-11-27 RX ADMIN — LEVOTHYROXINE SODIUM 75 MCG: 0.03 TABLET ORAL at 05:19

## 2024-11-27 RX ADMIN — ENOXAPARIN SODIUM 30 MG: 100 INJECTION SUBCUTANEOUS at 20:47

## 2024-11-27 RX ADMIN — CHOLECALCIFEROL (VITAMIN D3) 10 MCG (400 UNIT) TABLET 800 UNITS: at 09:27

## 2024-11-27 RX ADMIN — ATENOLOL 50 MG: 25 TABLET ORAL at 09:27

## 2024-11-27 RX ADMIN — ACETAMINOPHEN 650 MG: 325 TABLET ORAL at 09:34

## 2024-11-27 RX ADMIN — VERAPAMIL HYDROCHLORIDE 240 MG: 240 TABLET, FILM COATED, EXTENDED RELEASE ORAL at 20:46

## 2024-11-27 RX ADMIN — Medication 1 TABLET: at 09:26

## 2024-11-27 RX ADMIN — ENOXAPARIN SODIUM 30 MG: 100 INJECTION SUBCUTANEOUS at 09:28

## 2024-11-27 RX ADMIN — GUAIFENESIN 600 MG: 600 TABLET ORAL at 09:26

## 2024-11-27 RX ADMIN — ACETAMINOPHEN 650 MG: 325 TABLET ORAL at 17:31

## 2024-11-27 RX ADMIN — IBUPROFEN 800 MG: 400 TABLET, FILM COATED ORAL at 02:33

## 2024-11-27 RX ADMIN — SODIUM CHLORIDE, PRESERVATIVE FREE 10 ML: 5 INJECTION INTRAVENOUS at 20:50

## 2024-11-27 RX ADMIN — PANTOPRAZOLE SODIUM 40 MG: 40 TABLET, DELAYED RELEASE ORAL at 05:19

## 2024-11-27 RX ADMIN — ASPIRIN 81 MG: 81 TABLET, COATED ORAL at 09:27

## 2024-11-27 ASSESSMENT — PAIN SCALES - GENERAL: PAINLEVEL_OUTOF10: 5

## 2024-11-27 ASSESSMENT — PAIN DESCRIPTION - LOCATION: LOCATION: HEAD

## 2024-11-27 ASSESSMENT — PAIN DESCRIPTION - DESCRIPTORS: DESCRIPTORS: ACHING

## 2024-11-27 NOTE — CARE COORDINATION
Chart reviewed. Care managed by IM, consult ID pending.  Poor IV access. Requested Midline placement. Following for clinical improvement. IPTA no needs identified at this point. Anthony Hi RN

## 2024-11-27 NOTE — CONSULTS
Consult Placed   Consult sent via perfect serve  Who: Dr. Solano  Date:11/27/2024  Time: 9:15 AM       Electronically signed by Whit De Santiago on 11/27/2024 at 9:14 AM

## 2024-11-27 NOTE — PLAN OF CARE
Problem: Safety - Adult  Goal: Free from fall injury  Outcome: Progressing  Flowsheets (Taken 11/27/2024 1828)  Free From Fall Injury:   Instruct family/caregiver on patient safety   Based on caregiver fall risk screen, instruct family/caregiver to ask for assistance with transferring infant if caregiver noted to have fall risk factors     Problem: Pain  Goal: Verbalizes/displays adequate comfort level or baseline comfort level  Outcome: Not Progressing  Flowsheets (Taken 11/27/2024 1828)  Verbalizes/displays adequate comfort level or baseline comfort level:   Encourage patient to monitor pain and request assistance   Assess pain using appropriate pain scale   Administer analgesics based on type and severity of pain and evaluate response   Implement non-pharmacological measures as appropriate and evaluate response     Problem: Gastrointestinal - Adult  Goal: Minimal or absence of nausea and vomiting  Outcome: Not Progressing  Flowsheets (Taken 11/27/2024 1828)  Minimal or absence of nausea and vomiting:   Administer IV fluids as ordered to ensure adequate hydration   Maintain NPO status until nausea and vomiting are resolved   Administer ordered antiemetic medications as needed

## 2024-11-27 NOTE — PLAN OF CARE
Problem: Pain  Goal: Verbalizes/displays adequate comfort level or baseline comfort level  11/26/2024 2007 by Arjun Cornejo RN  Outcome: Progressing  Flowsheets (Taken 11/26/2024 1818 by Bettina Mcguire, RN)  Verbalizes/displays adequate comfort level or baseline comfort level:   Encourage patient to monitor pain and request assistance   Assess pain using appropriate pain scale   Administer analgesics based on type and severity of pain and evaluate response   Implement non-pharmacological measures as appropriate and evaluate response     Problem: Safety - Adult  Goal: Free from fall injury  11/26/2024 2007 by Arjun Cornejo RN  Outcome: Progressing  Flowsheets (Taken 11/26/2024 1818 by Bettina Mcguire RN)  Free From Fall Injury:   Instruct family/caregiver on patient safety   Based on caregiver fall risk screen, instruct family/caregiver to ask for assistance with transferring infant if caregiver noted to have fall risk factors     Problem: Skin/Tissue Integrity  Goal: Absence of new skin breakdown  Description: 1.  Monitor for areas of redness and/or skin breakdown  2.  Assess vascular access sites hourly  3.  Every 4-6 hours minimum:  Change oxygen saturation probe site  4.  Every 4-6 hours:  If on nasal continuous positive airway pressure, respiratory therapy assess nares and determine need for appliance change or resting period.  Outcome: Progressing     Problem: Gastrointestinal - Adult  Goal: Minimal or absence of nausea and vomiting  11/26/2024 2007 by Arjun Cornejo RN  Outcome: Progressing  Flowsheets (Taken 11/26/2024 1818 by Bettina Mcguire, RN)  Minimal or absence of nausea and vomiting:   Nasogastric tube to low intermittent suction as ordered   Administer ordered antiemetic medications as needed   Provide nonpharmacologic comfort measures as appropriate     Problem: ABCDS Injury Assessment  Goal: Absence of physical injury  Outcome: Progressing  Flowsheets (Taken 11/24/2024 1030 by Olga

## 2024-11-27 NOTE — CONSULTS
Infectious Diseases   Consult Note      Reason for Consult:  persistent fever    Requesting Physician:  Chaparro      Date of Admission: 11/22/2024    Subjective:   CHIEF COMPLAINT:  none given       HPI:    Sussy Casillas is a 48yoF with history of CAD, GERD, HLD, HTN, ABHILASH, obesity                 Urgent Care visit 11/20/24 - fever since 11/19/24, with fatigue, myalgia, HA, nausea, anorexia   Flu and COVID tests were negative   Rx azithro     PCP visit 11/22/24 and then ED on same day - intractable N/V, continued fever  Admitted for evaluation   UA with trace blood, ketones.  WBC was wnl, lactic 1.3  LFTs, lipase, CK nl     Abx were held on suspicion of viral illness  CT CAP negative for any acute pathology.  UA, BC, CXR, MRI brain also negative     Fever has persisted  SALVADOR negative     Nausea is waning, she is tolerating some po.  No BM since admission.  Abd pain denied.  No  symptoms   Headache is waning   No joint pain, rash, sore throat   Occasional dry cough     She is AF today   On RA     ID is consulted for e/m    Travel to [place] in 9/2024 to house sit  Travel to [place] ~2 weeks ago to retrieve her mother  No notable exposures either trip.  No new sexual partners  No other exposures reported  She lives locally wwith her mother, no pets     Current abx:  None        Past Surgical History:       Diagnosis Date    CAD (coronary artery disease)     GERD (gastroesophageal reflux disease) 06/06/2018    Heart attack (HCC) 11/19/2016    Hyperlipidemia 12/29/2016    Hypertension     Intractable nausea and vomiting 11/22/2024    Obesity (BMI 30-39.9) 08/12/2017    ABHILASH on CPAP 06/06/2018         Procedure Laterality Date    APPENDECTOMY      CORONARY ANGIOPLASTY WITH STENT PLACEMENT      JOINT REPLACEMENT  06/30/2022 & 11/29/2022    KNEE ARTHROSCOPY      KNEE ARTHROSCOPY Left 03/06/2019    medial menisectomy    KNEE ARTHROSCOPY Left 03/06/2019    LEFT KNEE VIDEO ARTHROSCOPY, PARTIAL MEDIAL MENISCECTOMY,  Subjective     Chief Complaint: Low back pain    Patient ID: Alvin Gordon is a 35 y.o. male seen for consultation today at the request of  Sanjay New MD    Back Pain   This is a chronic problem. The current episode started more than 1 year ago. The problem occurs constantly. The problem has been gradually worsening since onset. The pain is present in the lumbar spine and sacro-iliac. The quality of the pain is described as aching. The pain radiates to the right foot and left foot. The pain is at a severity of 8/10. The pain is severe. The pain is the same all the time. The symptoms are aggravated by lying down, bending, position, standing and twisting. Stiffness is present all day. Associated symptoms include headaches and leg pain. Pertinent negatives include no abdominal pain, bladder incontinence, bowel incontinence, chest pain, dysuria, fever, numbness, paresis, paresthesias, pelvic pain, perianal numbness, tingling, weakness or weight loss. He has tried analgesics, chiropractic manipulation, heat and NSAIDs for the symptoms. The treatment provided no relief.       This is a 35-year-old man who presents to my clinic with a long-standing history of progressive low back pain.  He has tried home exercises and anti-inflammatories.  These have been ineffective in alleviating his back pain.  He is recently reporting some leg pain that has become progressive.  His pain is affecting his ability to work and sleep.  He reports that his quality of life is diminished as a result of his refractory, chronic low back pain.    The following portions of the patient's history were reviewed and updated as appropriate: allergies, current medications, past family history, past medical history, past social history, past surgical history and problem list.    Family history:   Family History   Problem Relation Age of Onset   • No Known Problems Mother    • No Known Problems Father        Social history:   Social History      Social History   • Marital status:      Spouse name: N/A   • Number of children: N/A   • Years of education: N/A     Occupational History   • Not on file.     Social History Main Topics   • Smoking status: Never Smoker   • Smokeless tobacco: Never Used   • Alcohol use Yes      Comment: occasionally   • Drug use: No   • Sexual activity: Not on file     Other Topics Concern   • Not on file     Social History Narrative       Review of Systems   Constitutional: Negative for activity change, appetite change, chills, diaphoresis, fatigue, fever, unexpected weight change and weight loss.   HENT: Negative for congestion, dental problem, drooling, ear discharge, ear pain, facial swelling, hearing loss, mouth sores, nosebleeds, postnasal drip, rhinorrhea, sinus pressure, sneezing, sore throat, tinnitus, trouble swallowing and voice change.    Eyes: Negative for photophobia, pain, discharge, redness, itching and visual disturbance.   Respiratory: Negative for apnea, cough, choking, chest tightness, shortness of breath, wheezing and stridor.    Cardiovascular: Negative for chest pain, palpitations and leg swelling.   Gastrointestinal: Negative for abdominal distention, abdominal pain, anal bleeding, blood in stool, bowel incontinence, constipation, diarrhea, nausea, rectal pain and vomiting.   Endocrine: Negative for cold intolerance, heat intolerance, polydipsia, polyphagia and polyuria.   Genitourinary: Negative for bladder incontinence, decreased urine volume, difficulty urinating, dysuria, enuresis, flank pain, frequency, genital sores, hematuria, pelvic pain and urgency.   Musculoskeletal: Positive for arthralgias, back pain and myalgias. Negative for gait problem, joint swelling, neck pain and neck stiffness.   Skin: Negative for color change, pallor, rash and wound.   Allergic/Immunologic: Negative for environmental allergies, food allergies and immunocompromised state.   Neurological: Positive for headaches.  "Negative for dizziness, tingling, tremors, seizures, syncope, facial asymmetry, speech difficulty, weakness, light-headedness, numbness and paresthesias.   Hematological: Negative for adenopathy. Does not bruise/bleed easily.   Psychiatric/Behavioral: Negative for agitation, behavioral problems, confusion, decreased concentration, dysphoric mood, hallucinations, self-injury, sleep disturbance and suicidal ideas. The patient is not nervous/anxious and is not hyperactive.    All other systems reviewed and are negative.      Objective   Blood pressure 100/60, temperature 97.9 °F (36.6 °C), height 66\" (167.6 cm), weight 211 lb (95.7 kg).  Body mass index is 34.06 kg/(m^2).    Physical Exam   Constitutional: He is oriented to person, place, and time. He appears well-developed.  Non-toxic appearance.   HENT:   Head: Normocephalic and atraumatic.   Right Ear: Hearing normal.   Left Ear: Hearing normal.   Nose: Nose normal.   Eyes: Conjunctivae, EOM and lids are normal. Pupils are equal, round, and reactive to light.   Neck: Normal range of motion. No JVD present.   Cardiovascular: Normal rate and regular rhythm.    Pulses:       Radial pulses are 2+ on the right side, and 2+ on the left side.   Pulmonary/Chest: Effort normal. No stridor. No respiratory distress. He has no wheezes.   Musculoskeletal:        Thoracic back: He exhibits no tenderness, no swelling, no pain and no spasm.        Lumbar back: He exhibits decreased range of motion, tenderness and pain. He exhibits no bony tenderness, no swelling and no spasm.   Muscle Group    L          R  Hip Flexor          5          5  Knee Extensor  5          5  ADF                   5          5  APF                   5          5  EHL                   5          5   Neurological: He is alert and oriented to person, place, and time. He has normal strength. He displays normal reflexes. No cranial nerve deficit or sensory deficit. He exhibits normal muscle tone. He displays " a negative Romberg sign. Coordination and gait normal. GCS eye subscore is 4. GCS verbal subscore is 5. GCS motor subscore is 6.   Reflex Scores:       Tricep reflexes are 2+ on the right side and 2+ on the left side.       Bicep reflexes are 2+ on the right side and 2+ on the left side.       Brachioradialis reflexes are 2+ on the right side and 2+ on the left side.       Patellar reflexes are 2+ on the right side and 2+ on the left side.       Achilles reflexes are 1+ on the right side and 1+ on the left side.  Skin: Skin is warm and dry. No rash noted. No erythema.   Psychiatric: He has a normal mood and affect. His behavior is normal. Judgment and thought content normal.   Nursing note and vitals reviewed.        Assessment/Plan     Independent Review of Radiographic Studies:      Available for my review is a MRI of the lumbar spine performed on 10/23/17.  This discloses a grade 2 spondylolisthesis of L5 on S1.  The spondylolisthesis measures approximately 13 mm.  There are bilateral pars defects at L5-S1.  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  There is severe neural foraminal stenosis at L5-S1 bilaterally.    Medical Decision Making:      This is a 35-year-old man with a grade 2 isthmic spondylolisthesis.  He is a candidate for an L5-S1 fusion.  He is interested in surgery.  He has failed conservative treatment area did    Informed consent for an L5-S1 PLIF was obtained from the patient.  He acknowledges risk of nerve root injury, paralysis, weakness, loss of sensation, permanent neurologic deficit, bleeding, infection, spinal fluid leak, iatrogenic instability, failure of benefit of the operation, or need for additional procedures.  All questions were answered.  No guarantees are given or implied.  The patient elects proceed with surgery.    We will schedule him for an L5-S1 PLIF on an elective basis in the near future.    Alvin was seen today for back pain and leg  pain.    Diagnoses and all orders for this visit:    Pars defect of lumbar spine  -     CT Lumbar Spine Without Contrast; Future    Spondylolisthesis at L5-S1 level  -     CT Lumbar Spine Without Contrast; Future      No Follow-up on file.           This document signed by SHEN Jacinto MD November 20, 2017 4:36 PM

## 2024-11-28 VITALS
HEART RATE: 65 BPM | OXYGEN SATURATION: 100 % | SYSTOLIC BLOOD PRESSURE: 110 MMHG | DIASTOLIC BLOOD PRESSURE: 55 MMHG | WEIGHT: 231.2 LBS | BODY MASS INDEX: 37.16 KG/M2 | TEMPERATURE: 97.5 F | RESPIRATION RATE: 16 BRPM | HEIGHT: 66 IN

## 2024-11-28 LAB
ANION GAP SERPL CALCULATED.3IONS-SCNC: 13 MMOL/L (ref 3–16)
BASOPHILS # BLD: 0 K/UL (ref 0–0.2)
BASOPHILS NFR BLD: 0.9 %
BUN SERPL-MCNC: 13 MG/DL (ref 7–20)
CALCIUM SERPL-MCNC: 9 MG/DL (ref 8.3–10.6)
CHLORIDE SERPL-SCNC: 101 MMOL/L (ref 99–110)
CO2 SERPL-SCNC: 22 MMOL/L (ref 21–32)
CREAT SERPL-MCNC: 1 MG/DL (ref 0.6–1.1)
DEPRECATED RDW RBC AUTO: 15.1 % (ref 12.4–15.4)
EOSINOPHIL # BLD: 0.1 K/UL (ref 0–0.6)
EOSINOPHIL NFR BLD: 2.2 %
GFR SERPLBLD CREATININE-BSD FMLA CKD-EPI: 68 ML/MIN/{1.73_M2}
GLUCOSE SERPL-MCNC: 157 MG/DL (ref 70–99)
HCT VFR BLD AUTO: 34.6 % (ref 36–48)
HGB BLD-MCNC: 11.1 G/DL (ref 12–16)
LYMPHOCYTES # BLD: 1.5 K/UL (ref 1–5.1)
LYMPHOCYTES NFR BLD: 30 %
MAGNESIUM SERPL-MCNC: 1.96 MG/DL (ref 1.8–2.4)
MCH RBC QN AUTO: 25.4 PG (ref 26–34)
MCHC RBC AUTO-ENTMCNC: 32 G/DL (ref 31–36)
MCV RBC AUTO: 79.6 FL (ref 80–100)
MONOCYTES # BLD: 0.5 K/UL (ref 0–1.3)
MONOCYTES NFR BLD: 10 %
NEUTROPHILS # BLD: 2.9 K/UL (ref 1.7–7.7)
NEUTROPHILS NFR BLD: 56.9 %
PLATELET # BLD AUTO: 231 K/UL (ref 135–450)
PMV BLD AUTO: 8.4 FL (ref 5–10.5)
POTASSIUM SERPL-SCNC: 3.5 MMOL/L (ref 3.5–5.1)
RBC # BLD AUTO: 4.35 M/UL (ref 4–5.2)
SODIUM SERPL-SCNC: 136 MMOL/L (ref 136–145)
WBC # BLD AUTO: 5.1 K/UL (ref 4–11)

## 2024-11-28 PROCEDURE — 6370000000 HC RX 637 (ALT 250 FOR IP): Performed by: INTERNAL MEDICINE

## 2024-11-28 PROCEDURE — 83735 ASSAY OF MAGNESIUM: CPT

## 2024-11-28 PROCEDURE — 6360000002 HC RX W HCPCS: Performed by: INTERNAL MEDICINE

## 2024-11-28 PROCEDURE — 85025 COMPLETE CBC W/AUTO DIFF WBC: CPT

## 2024-11-28 PROCEDURE — 6360000002 HC RX W HCPCS: Performed by: STUDENT IN AN ORGANIZED HEALTH CARE EDUCATION/TRAINING PROGRAM

## 2024-11-28 PROCEDURE — 80048 BASIC METABOLIC PNL TOTAL CA: CPT

## 2024-11-28 PROCEDURE — 2580000003 HC RX 258

## 2024-11-28 PROCEDURE — 36415 COLL VENOUS BLD VENIPUNCTURE: CPT

## 2024-11-28 RX ORDER — BENZONATATE 100 MG/1
100 CAPSULE ORAL 3 TIMES DAILY PRN
Qty: 10 CAPSULE | Refills: 0 | Status: SHIPPED | OUTPATIENT
Start: 2024-11-28 | End: 2024-12-05

## 2024-11-28 RX ORDER — 0.9 % SODIUM CHLORIDE 0.9 %
1000 INTRAVENOUS SOLUTION INTRAVENOUS ONCE
Status: COMPLETED | OUTPATIENT
Start: 2024-11-28 | End: 2024-11-28

## 2024-11-28 RX ORDER — DRONABINOL 5 MG/1
5 CAPSULE ORAL 2 TIMES DAILY
Qty: 60 CAPSULE | Refills: 0 | Status: CANCELLED | OUTPATIENT
Start: 2024-11-28 | End: 2024-12-28

## 2024-11-28 RX ORDER — PANTOPRAZOLE SODIUM 40 MG/1
40 TABLET, DELAYED RELEASE ORAL
Qty: 30 TABLET | Refills: 3 | Status: SHIPPED | OUTPATIENT
Start: 2024-11-29

## 2024-11-28 RX ORDER — ONDANSETRON 4 MG/1
4 TABLET, ORALLY DISINTEGRATING ORAL EVERY 8 HOURS PRN
Qty: 14 TABLET | Refills: 0 | Status: SHIPPED | OUTPATIENT
Start: 2024-11-28

## 2024-11-28 RX ORDER — BENZONATATE 100 MG/1
100 CAPSULE ORAL 3 TIMES DAILY PRN
Qty: 10 CAPSULE | Refills: 0 | Status: SHIPPED | OUTPATIENT
Start: 2024-11-28 | End: 2024-11-28

## 2024-11-28 RX ORDER — ONDANSETRON 4 MG/1
4 TABLET, ORALLY DISINTEGRATING ORAL EVERY 8 HOURS PRN
Qty: 14 TABLET | Refills: 0 | Status: SHIPPED | OUTPATIENT
Start: 2024-11-28 | End: 2024-11-28

## 2024-11-28 RX ORDER — PANTOPRAZOLE SODIUM 40 MG/1
40 TABLET, DELAYED RELEASE ORAL
Qty: 30 TABLET | Refills: 3 | Status: SHIPPED | OUTPATIENT
Start: 2024-11-29 | End: 2024-11-28

## 2024-11-28 RX ADMIN — CHOLECALCIFEROL (VITAMIN D3) 10 MCG (400 UNIT) TABLET 800 UNITS: at 09:54

## 2024-11-28 RX ADMIN — ASPIRIN 81 MG: 81 TABLET, COATED ORAL at 09:54

## 2024-11-28 RX ADMIN — DRONABINOL 5 MG: 5 CAPSULE ORAL at 09:53

## 2024-11-28 RX ADMIN — Medication 1 TABLET: at 09:53

## 2024-11-28 RX ADMIN — BENZONATATE 100 MG: 100 CAPSULE ORAL at 04:55

## 2024-11-28 RX ADMIN — SODIUM CHLORIDE, PRESERVATIVE FREE 10 ML: 5 INJECTION INTRAVENOUS at 09:54

## 2024-11-28 RX ADMIN — ACETAMINOPHEN 650 MG: 325 TABLET ORAL at 04:55

## 2024-11-28 RX ADMIN — ENOXAPARIN SODIUM 30 MG: 100 INJECTION SUBCUTANEOUS at 09:53

## 2024-11-28 RX ADMIN — SODIUM CHLORIDE 1000 ML: 9 INJECTION, SOLUTION INTRAVENOUS at 11:37

## 2024-11-28 RX ADMIN — ACETAMINOPHEN 650 MG: 325 TABLET ORAL at 11:10

## 2024-11-28 RX ADMIN — IBUPROFEN 800 MG: 400 TABLET, FILM COATED ORAL at 07:39

## 2024-11-28 RX ADMIN — PANTOPRAZOLE SODIUM 40 MG: 40 TABLET, DELAYED RELEASE ORAL at 07:39

## 2024-11-28 RX ADMIN — GUAIFENESIN 600 MG: 600 TABLET ORAL at 09:53

## 2024-11-28 RX ADMIN — PROMETHAZINE HYDROCHLORIDE AND DEXTROMETHORPHAN HYDROBROMIDE ORAL SOLUTION 5 ML: 15; 6.25 SOLUTION ORAL at 07:40

## 2024-11-28 RX ADMIN — LEVOTHYROXINE SODIUM 75 MCG: 0.03 TABLET ORAL at 07:39

## 2024-11-28 RX ADMIN — OXYCODONE HYDROCHLORIDE AND ACETAMINOPHEN 500 MG: 500 TABLET ORAL at 09:53

## 2024-11-28 ASSESSMENT — PAIN SCALES - GENERAL
PAINLEVEL_OUTOF10: 5
PAINLEVEL_OUTOF10: 4
PAINLEVEL_OUTOF10: 5

## 2024-11-28 ASSESSMENT — PAIN DESCRIPTION - LOCATION
LOCATION: HEAD
LOCATION: HEAD

## 2024-11-28 ASSESSMENT — PAIN DESCRIPTION - DESCRIPTORS: DESCRIPTORS: ACHING

## 2024-11-28 NOTE — DISCHARGE SUMMARY
V2.0  Discharge Summary    Name:  Sussy Casillas /Age/Sex: 1975 (48 y.o. female)   Admit Date: 2024  Discharge Date: 24    MRN & CSN:  [id number] & [id number] Encounter Date and Time 24 11:21 AM EST    Attending:  Mirza Amaya DO Discharging Provider: Javi German DO       Hospital Course:     Brief HPI: Sussy Casillas is a 48 y.o. female with past medical history of CAD, hypertension, GERD, dyslipidemia, ABHILASH, tobacco use who presented with nausea and vomiting. Patient stated that her symptoms started about 5 days prior to presentation. There has been no blood in her vomit. There is mild abdominal discomfort. She stated that some family members had tested positive for COVID but that she tested negative. She was started on a course of antibiotics but this did not improve her symptoms. ED workup showed hemoglobin 11.6, hematocrit 35.4, glucose 115, AST 39, phosphorus 1.9, unremarkable chest x-ray, no acute intracranial abnormality on CT head, no acute findings in abdomen or pelvis on CT. Patient was admitted for intractable vomiting.     Brief Problem Based Course:     Gastroenteritis  Nausea and vomiting (resolved)  -Previously received antibiotics without symptoms improving.  Possibly secondary to viral infection  -CT abdomen pelvis on  showed no acute findings, hepatomegaly, hepatic steatosis  -Received Compazine/Zofran as needed  -Received 1 dose of aprepitant on  due to persistent symptoms  -Continue scheduled scopolamine patch upon discharge  -Discontinue Marinol 5 mg BID upon discharge due to dizziness  -Continue Zofran PRN upon discharge     Headache (improved)  -Possibly secondary to viral infection, vomiting, blood pressure.  No evidence of meningitis on physical exam with negative Kernig and Brudzinski sign and no nuchal rigidity  -Migraine cocktail of Toradol, Compazine, Benadryl given on  and   -Brain MRI  unremarkable  -Repeat migraine

## 2024-11-28 NOTE — PLAN OF CARE
Pt a/o, rates pain appropriately using 0-10 pain scale. Pt states she still has a headache pretty consistently but pain interventions help. Asked if she wanted to try a cool rag and she declined. pt calls out as needed for pain intervention; will continue to assess and administer intervention as ordered and requested.

## 2024-11-28 NOTE — PROGRESS NOTES
V2.0    [facility] [place] Progress Note      Name:  Sussy Casillas /Age/Sex: 1975  (48 y.o. female)   MRN & CSN:  [id number] & [id number] Encounter Date/Time: 2024 7:24 AM EST   Location:   PCP: Bettina Patel APRN - CNP     Attending:Mirza Amaya DO       Hospital Day: 3    Assessment and Recommendations   Sussy Casillas is a 48 y.o. female with pmh of oronary artery disease status post stenting, essential hypertension, GERD, dyslipidemia, obstructive sleep apnea, everyday smoker who presents with Intractable nausea and vomiting    Interval History: Patient was seen at bedside this morning. She reports continued nausea and vomiting. She had fever up to 102 over night. She endorses current headache. She did have some relief with the headache cocktail, but then the headache recurred.   Plan:     Gastroenteritis  Nausea and vomiting  -Previously received antibiotics without symptoms improving.  Possibly secondary to viral infection  -CT abdomen pelvis on  showed no acute findings, hepatomegaly, hepatic steatosis  -Continue IV fluids at 125 mL/hr  -Continue scheduled scopolamine patch  -Continue Compazine as needed  -Will give one time dose of aprepitant (24) given patient's continued symptoms    Headache  -Possibly secondary to viral infection, vomiting, blood pressure  - No evidence of meningitis on physical exam (Negative Kernig sign, negative Brudzinski sign, no nuchal rigidity)  - Will obtain Brain MRI   -Migraine cocktail of Toradol, Compazine, Benadryl given on  and   -Continue to monitor for symptoms     Coronary artery disease status post stenting and bypass  -Continue atenolol 50 mg, aspirin 81 mg, losartan 100 mg, Crestor 40 mg  -Start IV hydralazine 10 mg every 8 hours as needed if unable to tolerate p.o. meds     Hypothyroidism  -Continue home levothyroxine 75 mcg    Hypertension  -Continue atenolol 50 mg and losartan 100 
    V2.0    [facility] [place]      Name:  Sussy Casillas /Age/Sex: 1975  (48 y.o. female)   MRN & CSN:  [id number] & [id number] Encounter Date/Time: 2024 7:24 AM EST   Location:   PCP: Bettina Patel APRN - CNP     Attending:Miguel Angel Mayfield MD       Hospital Day: 6    Assessment and Recommendations   Sussy Casillas is a 48 y.o. female with pmh of oronary artery disease status post stenting, essential hypertension, GERD, dyslipidemia, obstructive sleep apnea, everyday smoker who presents with Intractable nausea and vomiting    Interval History: Overnight, patient had a fever of 101.3.  Patient was seen and examined at bedside this morning with daughter on the phone.  She states that her nausea and vomiting have significantly improved with only 2 episodes of mild nausea and no vomiting at all.  She has been tolerating food better and eating slightly more.  She states that her headache is also improved and rates it severity at 4 out of 10.  She denies other concerns.    Plan:     Gastroenteritis  Nausea and vomiting (improving)  -Previously received antibiotics without symptoms improving.  Possibly secondary to viral infection  -CT abdomen pelvis on  showed no acute findings, hepatomegaly, hepatic steatosis  -Discontinued IV fluids  -Continue scheduled scopolamine patch  -Continue Compazine/Zofran as needed  -Received 1 dose of aprepitant on  due to persistent symptoms  -Continue Marinol 5 mg BID    Headache (improving)  -Possibly secondary to viral infection, vomiting, blood pressure.  No evidence of meningitis on physical exam with negative Kernig and Brudzinski sign and no nuchal rigidity  -Migraine cocktail of Toradol, Compazine, Benadryl given on  and   -Brain MRI  unremarkable  -Repeat migraine cocktail ordered on     Fever  -Multiple temps several nights in a row above 100.4 F  -Unclear etiology.  Possibly viral gastroenteritis vs 
    V2.0    [facility] [place] Progress Note      Name:  Sussy Casillas /Age/Sex: 1975  (48 y.o. female)   MRN & CSN:  [id number] & [id number] Encounter Date/Time: 2024 7:24 AM EST   Location:   PCP: Bettina Patel APRN - CNP     Attending:Mirza Amaya DO       Hospital Day: 2    Assessment and Recommendations   Sussy Casillas is a 48 y.o. female with pmh of oronary artery disease status post stenting, essential hypertension, GERD, dyslipidemia, obstructive sleep apnea, everyday smoker who presents with Intractable nausea and vomiting    Interval History: No acute overnight events.  Patient was seen and examined at bedside this morning.  She reports feeling fever, chills, shortness of breath, nausea, dry heaving, constipation.  She denies chest pain, abdominal pain, diarrhea.  Further history was obtained and patient states that she has not been on Zepbound for months.  She reports that she was on it for 2 months over the summer around May/Kassandra and that she discontinued it at that time due to cost.    Plan:     Gastroenteritis  Nausea and vomiting  -Previously received antibiotics without symptoms improving.  Possibly secondary to viral infection  -CT abdomen pelvis on  showed no acute findings, hepatomegaly, hepatic steatosis  -Continue IV fluids at 125 mL/hr  -Continue scheduled scopolamine patch  -Continue Zofran as needed  -May consider aprepitant if patient's symptoms are resistant    Headache  -Possibly secondary to viral infection, vomiting, blood pressure  -Migraine cocktail of Toradol, Compazine, Benadryl given on   -Continue to monitor for symptoms     Coronary artery disease status post stenting and bypass  -Continue atenolol 50 mg, aspirin 81 mg, losartan 100 mg, Crestor 40 mg  -Start IV hydralazine 10 mg every 8 hours as needed if unable to tolerate p.o. meds     Hypothyroidism  -Continue home levothyroxine 75 mcg    Hypertension  -Continue atenolol 50 mg and 
A/O x4. PRN zofran given for c/o nausea. No c/o HA today.     Refused breakfast and oral meds d/t nausea.   
A/O x4. Up to bathroom independently. Patient c/o HA and neck pain described as pressure.     PRN zofran given for vomitting.     BP elevated. Schedule Atenolol and losartan given as ordered. Patient states she takes verapamil at night. Sent message to pharmacy to change time.     Patient states she hasn't been able to keep any of her oral meds down at home.     Refused breakfast d/t N/V.     0935: Perfect Serve sent to Dr. German:    \"Good Morning,   Her BP is high and she is unable to take oral meds d/t nausea. She hasn't taken her BP meds for several days d/t N/V. Do you want to order something IV?     She also has a HA and unable to swallow Tylenol. I thought if we treat BP, maybe that will help with HA.     Also, she is feeling rough and says she is thinking about agreeing to the LP to check for meningitis. Thanks.\"    1003: New order noted for Hydralazine.     Perfect Serve sent to Dr. German:    \"ok, I rechecked her Bp and it is 160/92 so she doesn't fit the paramaters to give. She is asking for something IV for her HA. Thanks.\"  
DIT called for Midline/PICC placement.        Electronically signed by Whit De Satniago on 11/27/2024 at 10:24 AM     
ID     Chart, labs reviewed  The transaminases are mildly elevated  Repeat CBC Pending   The CRP wasn't too high at 32    The fever curve is clearly improved  Overall picture continues to be most consistent with protracted viral illness  Serology for EBV, CMV are pending but care is supportive for both     No indication for continued admission from ID standpoint   She should have LFTs repeated within a few weeks per PCP     Please reach out with further questions   LOVE BURKS MD      
Order for Midline per Javi German DO.  Pre procedure and timeout done with pt's RN.    Successful insertion of a SL midline into pt's left basilic vein. No issues gaining access or advancing guidewire/introducer/midline    Midline is cut at 15cm and out externally 0 cm. SL lumens flush without resistance and draw back brisk blood return.    MIdline site CDI with hemostasis maintained and a chg dressing applied to site. Pt instructed to stay in bed and keep arm flat and still for 30 minutes  to promote hemostasis.     Bettina GOLDMAN given handoff report         
Patient continues with nausea, fevers, vomiting and headache overnight. Nausea and vomiting unrelieved with Zofran, orders obtained for Compazine (see MAR).   
Physical/Occupational Therapy    Therapy received orders on patient. Chart reviewed and nsg. Cleared. Spoke to patient who reports they have been getting around the room and going to the bathroom IND w/o AD and has no acute therapy needs at this time. PT/OT will sign off, if the need arises, please re-consult. Thank you.    Volodymyr Ingram PT, DPT.  Nicole Mcclendon, OTL/R    
Pt assessment completed and charted. VSS. Pt a/o. PRN medication administered per MAR for nausea\headache. Pt improving with symptoms. Bed in lowest position and wheels locked. Call light within reach. Bedside table within reach. Non-skid socks in place. Pt denies any other needs at this time.  Pt calls out appropriately.    
Pt assessment completed and charted. VSS. Pt a/o. Scheduled\PRN medication administered per MAR for pain\nausea. Bed in lowest position and wheels locked. Call light within reach. Bedside table within reach. Non-skid socks in place. Pt denies any other needs at this time.  Pt calls out appropriately.    
Pt called out stating she did not feel right. RN to bedside and she said she felt high. MD was rounding so he came back and said this is how she felt yesterday as well. Bolus started. BP normal at 131/76. Pt drowsy. Pt stable and denied needs.   
Pt d/c in stable condition with family. MIDLINE removed and pt instructed not to remove dsg for 24 hours. Tele removed and [facility] notified of d/c. Written d/c instructions provided and pt verbalized understanding of following up.  
Pt with low BP, dizziness, blurred vision and states she \"does not feel right\"- MD notified. BP monitored q 15 minutes until hypotension and symptoms resolved- see flowsheets. Midline ordered awaiting arrival of DIT to administer IVF.  
Received patient on bed, alert and oriented x4. Rechecked her temperature- 99F as pt feels she got a fever. Still has poor appetite. Denies needs at this time.     Call bell within reach. Independent and calls out appropriately. Maintained a calm and comfortable environment. Shift assessment updated and documented.  
Received patient on bed, alert and oriented x4. She feels better after giving cough syrup and Tylenol. Has poor appetite. Applied new scope patch to manage symptoms.    Call bell within reach. Independent and calls out appropriately. Maintained a calm and comfortable environment. Shift assessment updated and documented.  
Reviewed SBIRT and SDOH screenings. Patient had no needs at this time.  
\"Tarik Vides, patient's PIV is out. Currently patient has no IV meds aside from PRNs. She was crying when she knew that her IV is out because she is a hard stick. I told her that I will leave it out for now unless it's needed. Thanks.\"    Informed NP Peewee Tovar thru perfect serve and responded can hold for now.  
GI/Bowel: There is no bowel dilatation or bowel wall thickening.  The appendix is not identified and may be absent.  There is no enlarged appendix there are no inflammatory changes near the cecum.  The stomach is unremarkable. Pelvis: The bladder is nondistended and otherwise unremarkable.  No free fluid or abnormal adnexal masses identified. Peritoneum/Retroperitoneum: No evidence of lymphadenopathy.  Aorta is normal in caliber.  No fat stranding, free fluid, free air or focal fluid collection is identified. Bones/Soft Tissues: No fracture or destructive bone lesion is identified.     1. No acute findings in the abdomen or pelvis. 2. Hepatomegaly and hepatic steatosis.     CT HEAD WO CONTRAST    Result Date: 11/22/2024  EXAMINATION: CT OF THE HEAD WITHOUT CONTRAST  11/22/2024 6:11 pm TECHNIQUE: CT of the head was performed without the administration of intravenous contrast. Automated exposure control, iterative reconstruction, and/or weight based adjustment of the mA/kV was utilized to reduce the radiation dose to as low as reasonably achievable. COMPARISON: None. HISTORY: ORDERING SYSTEM PROVIDED HISTORY: severe headache TECHNOLOGIST PROVIDED HISTORY: If patient is on cardiac monitor and/or pulse ox, they may be taken off cardiac monitor and pulse ox, left on O2 if currently on. All monitors reattached when patient returns to room. Has a \"code stroke\" or \"stroke alert\" been called?->No Reason for exam:->severe headache Decision Support Exception - unselect if not a suspected or confirmed emergency medical condition->Emergency Medical Condition (MA) Reason for Exam: HA 3 days with n/v FINDINGS: BRAIN/VENTRICLES: There is no acute intracranial hemorrhage, mass effect or midline shift.  No abnormal extra-axial fluid collection.  The gray-white differentiation is maintained without evidence of an acute infarct.  There is no evidence of hydrocephalus. ORBITS: The visualized portion of the orbits demonstrate no acute 
BACTERIA Rare 11/22/2024 04:58 PM    CLARITYU Clear 11/22/2024 04:58 PM    SPECGRAV 1.020 10/31/2018 04:58 PM    LEUKOCYTESUR Negative 11/22/2024 04:58 PM    UROBILINOGEN 0.2 11/22/2024 04:58 PM    BILIRUBINUR Negative 11/22/2024 04:58 PM    BLOODU TRACE-INTACT 11/22/2024 04:58 PM    GLUCOSEU Negative 11/22/2024 04:58 PM    KETUA >=80 11/22/2024 04:58 PM     Urine Cultures:   Lab Results   Component Value Date/Time    LABURIN No growth at 18-36 hours 10/31/2018 04:58 PM     Blood Cultures:   Lab Results   Component Value Date/Time    BC  11/22/2024 07:34 PM     No Growth to date.  Any change in status will be called.     Lab Results   Component Value Date/Time    BLOODCULT2  11/22/2024 07:34 PM     No Growth to date.  Any change in status will be called.     Organism: No results found for: \"ORG\"    Electronically signed by Javi German DO on 11/25/2024 at 7:28 AM     Javi German DO, PGY-2  [facility]

## 2024-11-29 LAB
CMV IGM SERPL IA-ACNC: <8 AU/ML
EBV EA-D IGG SER-ACNC: 69.3 U/ML (ref 0–10.9)
EBV NA IGG SER IA-ACNC: >600 U/ML (ref 0–21.9)
EBV VCA IGG SER IA-ACNC: 449 U/ML (ref 0–21.9)
EBV VCA IGM SER IA-ACNC: 26.4 U/ML (ref 0–43.9)

## 2024-11-30 LAB — CORTIS F SERPL-MCNC: 0.52 UG/DL

## 2024-12-02 ENCOUNTER — OFFICE VISIT (OUTPATIENT)
Dept: FAMILY MEDICINE CLINIC | Age: 49
End: 2024-12-02
Payer: COMMERCIAL

## 2024-12-02 ENCOUNTER — APPOINTMENT (OUTPATIENT)
Dept: CT IMAGING | Age: 49
DRG: 391 | End: 2024-12-02
Payer: COMMERCIAL

## 2024-12-02 ENCOUNTER — HOSPITAL ENCOUNTER (INPATIENT)
Age: 49
LOS: 1 days | Discharge: HOME | DRG: 391 | End: 2024-12-03
Attending: EMERGENCY MEDICINE | Admitting: INTERNAL MEDICINE
Payer: COMMERCIAL

## 2024-12-02 VITALS
WEIGHT: 229.8 LBS | BODY MASS INDEX: 37.11 KG/M2 | RESPIRATION RATE: 16 BRPM | HEART RATE: 111 BPM | SYSTOLIC BLOOD PRESSURE: 137 MMHG | DIASTOLIC BLOOD PRESSURE: 80 MMHG | TEMPERATURE: 98 F | OXYGEN SATURATION: 99 %

## 2024-12-02 DIAGNOSIS — R11.2 ACUTE NAUSEA WITH NONBILIOUS VOMITING: ICD-10-CM

## 2024-12-02 DIAGNOSIS — Z09 HOSPITAL DISCHARGE FOLLOW-UP: Primary | ICD-10-CM

## 2024-12-02 DIAGNOSIS — E86.0 ACUTE DEHYDRATION: ICD-10-CM

## 2024-12-02 DIAGNOSIS — R74.01 TRANSAMINITIS: ICD-10-CM

## 2024-12-02 DIAGNOSIS — B27.90 EBV INFECTION: ICD-10-CM

## 2024-12-02 DIAGNOSIS — Z87.898 H/O MOTION SICKNESS: ICD-10-CM

## 2024-12-02 DIAGNOSIS — R10.13 EPIGASTRIC PAIN: ICD-10-CM

## 2024-12-02 DIAGNOSIS — R11.2 NAUSEA AND VOMITING, UNSPECIFIED VOMITING TYPE: Primary | ICD-10-CM

## 2024-12-02 PROBLEM — R74.8 ELEVATED LIVER ENZYMES: Status: ACTIVE | Noted: 2024-12-02

## 2024-12-02 LAB
ALBUMIN SERPL-MCNC: 4.3 G/DL (ref 3.4–5)
ALBUMIN/GLOB SERPL: 1 {RATIO} (ref 1.1–2.2)
ALP SERPL-CCNC: 242 U/L (ref 40–129)
ALT SERPL-CCNC: 205 U/L (ref 10–40)
ANION GAP SERPL CALCULATED.3IONS-SCNC: 12 MMOL/L (ref 3–16)
AST SERPL-CCNC: 132 U/L (ref 15–37)
BACTERIA URNS QL MICRO: ABNORMAL /HPF
BASE EXCESS BLDV CALC-SCNC: 2.4 MMOL/L (ref -3–3)
BASOPHILS # BLD: 0.1 K/UL (ref 0–0.2)
BASOPHILS NFR BLD: 1 %
BILIRUB SERPL-MCNC: 0.4 MG/DL (ref 0–1)
BILIRUB UR QL STRIP.AUTO: ABNORMAL
BUN SERPL-MCNC: 7 MG/DL (ref 7–20)
CALCIUM SERPL-MCNC: 9.9 MG/DL (ref 8.3–10.6)
CHLORIDE SERPL-SCNC: 95 MMOL/L (ref 99–110)
CLARITY UR: CLEAR
CO2 BLDV-SCNC: 29 MMOL/L
CO2 SERPL-SCNC: 30 MMOL/L (ref 21–32)
COHGB MFR BLDV: 2.1 % (ref 0–1.5)
COLOR UR: YELLOW
CREAT SERPL-MCNC: 0.7 MG/DL (ref 0.6–1.1)
CRP SERPL-MCNC: 13.3 MG/L (ref 0–5.1)
DEPRECATED RDW RBC AUTO: 15.4 % (ref 12.4–15.4)
EOSINOPHIL # BLD: 0 K/UL (ref 0–0.6)
EOSINOPHIL NFR BLD: 0.5 %
EPI CELLS #/AREA URNS HPF: ABNORMAL /HPF (ref 0–5)
ERYTHROCYTE [SEDIMENTATION RATE] IN BLOOD BY WESTERGREN METHOD: 86 MM/HR (ref 0–30)
FLUAV RNA RESP QL NAA+PROBE: NOT DETECTED
FLUBV RNA RESP QL NAA+PROBE: NOT DETECTED
GFR SERPLBLD CREATININE-BSD FMLA CKD-EPI: >90 ML/MIN/{1.73_M2}
GLUCOSE SERPL-MCNC: 107 MG/DL (ref 70–99)
GLUCOSE UR STRIP.AUTO-MCNC: NEGATIVE MG/DL
HCG SERPL QL: NEGATIVE
HCO3 BLDV-SCNC: 27.6 MMOL/L (ref 23–29)
HCT VFR BLD AUTO: 38.2 % (ref 36–48)
HETEROPH AB BLD QL IA: NEGATIVE
HGB BLD-MCNC: 12.5 G/DL (ref 12–16)
HGB UR QL STRIP.AUTO: NEGATIVE
KETONES UR STRIP.AUTO-MCNC: 40 MG/DL
LEUKOCYTE ESTERASE UR QL STRIP.AUTO: ABNORMAL
LIPASE SERPL-CCNC: 34 U/L (ref 13–60)
LYMPHOCYTES # BLD: 2 K/UL (ref 1–5.1)
LYMPHOCYTES NFR BLD: 26 %
MAGNESIUM SERPL-MCNC: 1.86 MG/DL (ref 1.8–2.4)
MCH RBC QN AUTO: 26 PG (ref 26–34)
MCHC RBC AUTO-ENTMCNC: 32.8 G/DL (ref 31–36)
MCV RBC AUTO: 79 FL (ref 80–100)
METHGB MFR BLDV: 0.1 %
MONOCYTES # BLD: 0.7 K/UL (ref 0–1.3)
MONOCYTES NFR BLD: 9.6 %
MUCOUS THREADS #/AREA URNS LPF: ABNORMAL /LPF
NEUTROPHILS # BLD: 4.9 K/UL (ref 1.7–7.7)
NEUTROPHILS NFR BLD: 62.9 %
NITRITE UR QL STRIP.AUTO: NEGATIVE
O2 THERAPY: ABNORMAL
PCO2 BLDV: 44.6 MMHG (ref 40–50)
PH BLDV: 7.41 [PH] (ref 7.35–7.45)
PH UR STRIP.AUTO: 6.5 [PH] (ref 5–8)
PLATELET # BLD AUTO: 336 K/UL (ref 135–450)
PMV BLD AUTO: 7.9 FL (ref 5–10.5)
PO2 BLDV: 43.2 MMHG (ref 25–40)
POTASSIUM SERPL-SCNC: 3.5 MMOL/L (ref 3.5–5.1)
PROT SERPL-MCNC: 8.7 G/DL (ref 6.4–8.2)
PROT UR STRIP.AUTO-MCNC: ABNORMAL MG/DL
RBC # BLD AUTO: 4.83 M/UL (ref 4–5.2)
RBC #/AREA URNS HPF: ABNORMAL /HPF (ref 0–4)
SAO2 % BLDV: 79 %
SARS-COV-2 RNA RESP QL NAA+PROBE: NOT DETECTED
SODIUM SERPL-SCNC: 137 MMOL/L (ref 136–145)
SP GR UR STRIP.AUTO: 1.02 (ref 1–1.03)
UA COMPLETE W REFLEX CULTURE PNL UR: ABNORMAL
UA DIPSTICK W REFLEX MICRO PNL UR: YES
URN SPEC COLLECT METH UR: ABNORMAL
UROBILINOGEN UR STRIP-ACNC: 1 E.U./DL
WBC # BLD AUTO: 7.7 K/UL (ref 4–11)
WBC #/AREA URNS HPF: ABNORMAL /HPF (ref 0–5)

## 2024-12-02 PROCEDURE — 96361 HYDRATE IV INFUSION ADD-ON: CPT

## 2024-12-02 PROCEDURE — 83735 ASSAY OF MAGNESIUM: CPT

## 2024-12-02 PROCEDURE — 2580000003 HC RX 258

## 2024-12-02 PROCEDURE — 99285 EMERGENCY DEPT VISIT HI MDM: CPT

## 2024-12-02 PROCEDURE — 80074 ACUTE HEPATITIS PANEL: CPT

## 2024-12-02 PROCEDURE — 96374 THER/PROPH/DIAG INJ IV PUSH: CPT

## 2024-12-02 PROCEDURE — 3075F SYST BP GE 130 - 139MM HG: CPT | Performed by: NURSE PRACTITIONER

## 2024-12-02 PROCEDURE — 80053 COMPREHEN METABOLIC PANEL: CPT

## 2024-12-02 PROCEDURE — 1111F DSCHRG MED/CURRENT MED MERGE: CPT | Performed by: NURSE PRACTITIONER

## 2024-12-02 PROCEDURE — 96375 TX/PRO/DX INJ NEW DRUG ADDON: CPT

## 2024-12-02 PROCEDURE — 6360000002 HC RX W HCPCS: Performed by: EMERGENCY MEDICINE

## 2024-12-02 PROCEDURE — 6360000002 HC RX W HCPCS: Performed by: INTERNAL MEDICINE

## 2024-12-02 PROCEDURE — 81001 URINALYSIS AUTO W/SCOPE: CPT

## 2024-12-02 PROCEDURE — 82803 BLOOD GASES ANY COMBINATION: CPT

## 2024-12-02 PROCEDURE — 86308 HETEROPHILE ANTIBODY SCREEN: CPT

## 2024-12-02 PROCEDURE — 83690 ASSAY OF LIPASE: CPT

## 2024-12-02 PROCEDURE — 6360000004 HC RX CONTRAST MEDICATION

## 2024-12-02 PROCEDURE — 74177 CT ABD & PELVIS W/CONTRAST: CPT

## 2024-12-02 PROCEDURE — 6360000002 HC RX W HCPCS

## 2024-12-02 PROCEDURE — 84703 CHORIONIC GONADOTROPIN ASSAY: CPT

## 2024-12-02 PROCEDURE — 3079F DIAST BP 80-89 MM HG: CPT | Performed by: NURSE PRACTITIONER

## 2024-12-02 PROCEDURE — 86140 C-REACTIVE PROTEIN: CPT

## 2024-12-02 PROCEDURE — 2580000003 HC RX 258: Performed by: EMERGENCY MEDICINE

## 2024-12-02 PROCEDURE — 85652 RBC SED RATE AUTOMATED: CPT

## 2024-12-02 PROCEDURE — 1200000000 HC SEMI PRIVATE

## 2024-12-02 PROCEDURE — 2580000003 HC RX 258: Performed by: INTERNAL MEDICINE

## 2024-12-02 PROCEDURE — 87636 SARSCOV2 & INF A&B AMP PRB: CPT

## 2024-12-02 PROCEDURE — 99214 OFFICE O/P EST MOD 30 MIN: CPT | Performed by: NURSE PRACTITIONER

## 2024-12-02 PROCEDURE — 85025 COMPLETE CBC W/AUTO DIFF WBC: CPT

## 2024-12-02 RX ORDER — ASPIRIN 81 MG/1
81 TABLET ORAL DAILY
Status: DISCONTINUED | OUTPATIENT
Start: 2024-12-03 | End: 2024-12-03 | Stop reason: HOSPADM

## 2024-12-02 RX ORDER — LOSARTAN POTASSIUM 100 MG/1
100 TABLET ORAL DAILY
Status: DISCONTINUED | OUTPATIENT
Start: 2024-12-03 | End: 2024-12-03 | Stop reason: HOSPADM

## 2024-12-02 RX ORDER — DIPHENHYDRAMINE HYDROCHLORIDE 50 MG/ML
25 INJECTION INTRAMUSCULAR; INTRAVENOUS ONCE
Status: COMPLETED | OUTPATIENT
Start: 2024-12-02 | End: 2024-12-02

## 2024-12-02 RX ORDER — 0.9 % SODIUM CHLORIDE 0.9 %
1000 INTRAVENOUS SOLUTION INTRAVENOUS ONCE
Status: COMPLETED | OUTPATIENT
Start: 2024-12-02 | End: 2024-12-02

## 2024-12-02 RX ORDER — ONDANSETRON 4 MG/1
4 TABLET, ORALLY DISINTEGRATING ORAL EVERY 8 HOURS PRN
Status: DISCONTINUED | OUTPATIENT
Start: 2024-12-02 | End: 2024-12-03 | Stop reason: HOSPADM

## 2024-12-02 RX ORDER — ACETAMINOPHEN 325 MG/1
650 TABLET ORAL EVERY 6 HOURS PRN
Status: DISCONTINUED | OUTPATIENT
Start: 2024-12-02 | End: 2024-12-03 | Stop reason: HOSPADM

## 2024-12-02 RX ORDER — ONDANSETRON 2 MG/ML
4 INJECTION INTRAMUSCULAR; INTRAVENOUS EVERY 6 HOURS PRN
Status: DISCONTINUED | OUTPATIENT
Start: 2024-12-02 | End: 2024-12-03 | Stop reason: HOSPADM

## 2024-12-02 RX ORDER — IOPAMIDOL 755 MG/ML
75 INJECTION, SOLUTION INTRAVASCULAR
Status: COMPLETED | OUTPATIENT
Start: 2024-12-02 | End: 2024-12-02

## 2024-12-02 RX ORDER — METOCLOPRAMIDE HYDROCHLORIDE 5 MG/ML
10 INJECTION INTRAMUSCULAR; INTRAVENOUS ONCE
Status: COMPLETED | OUTPATIENT
Start: 2024-12-02 | End: 2024-12-02

## 2024-12-02 RX ORDER — SODIUM CHLORIDE 9 MG/ML
INJECTION, SOLUTION INTRAVENOUS CONTINUOUS
Status: DISCONTINUED | OUTPATIENT
Start: 2024-12-02 | End: 2024-12-03 | Stop reason: HOSPADM

## 2024-12-02 RX ORDER — ASCORBIC ACID 500 MG
500 TABLET ORAL DAILY
Status: DISCONTINUED | OUTPATIENT
Start: 2024-12-03 | End: 2024-12-03 | Stop reason: HOSPADM

## 2024-12-02 RX ORDER — POTASSIUM CHLORIDE 1500 MG/1
40 TABLET, EXTENDED RELEASE ORAL PRN
Status: DISCONTINUED | OUTPATIENT
Start: 2024-12-02 | End: 2024-12-03 | Stop reason: HOSPADM

## 2024-12-02 RX ORDER — MAGNESIUM SULFATE IN WATER 40 MG/ML
2000 INJECTION, SOLUTION INTRAVENOUS PRN
Status: DISCONTINUED | OUTPATIENT
Start: 2024-12-02 | End: 2024-12-03 | Stop reason: HOSPADM

## 2024-12-02 RX ORDER — POLYETHYLENE GLYCOL 3350 17 G/17G
17 POWDER, FOR SOLUTION ORAL DAILY PRN
Status: DISCONTINUED | OUTPATIENT
Start: 2024-12-02 | End: 2024-12-03 | Stop reason: HOSPADM

## 2024-12-02 RX ORDER — SODIUM CHLORIDE 0.9 % (FLUSH) 0.9 %
5-40 SYRINGE (ML) INJECTION PRN
Status: DISCONTINUED | OUTPATIENT
Start: 2024-12-02 | End: 2024-12-03 | Stop reason: HOSPADM

## 2024-12-02 RX ORDER — POTASSIUM CHLORIDE 7.45 MG/ML
10 INJECTION INTRAVENOUS PRN
Status: DISCONTINUED | OUTPATIENT
Start: 2024-12-02 | End: 2024-12-03 | Stop reason: HOSPADM

## 2024-12-02 RX ORDER — SODIUM CHLORIDE 9 MG/ML
INJECTION, SOLUTION INTRAVENOUS PRN
Status: DISCONTINUED | OUTPATIENT
Start: 2024-12-02 | End: 2024-12-03 | Stop reason: HOSPADM

## 2024-12-02 RX ORDER — ENOXAPARIN SODIUM 100 MG/ML
30 INJECTION SUBCUTANEOUS 2 TIMES DAILY
Status: DISCONTINUED | OUTPATIENT
Start: 2024-12-02 | End: 2024-12-03 | Stop reason: HOSPADM

## 2024-12-02 RX ORDER — SODIUM CHLORIDE 0.9 % (FLUSH) 0.9 %
5-40 SYRINGE (ML) INJECTION EVERY 12 HOURS SCHEDULED
Status: DISCONTINUED | OUTPATIENT
Start: 2024-12-02 | End: 2024-12-03 | Stop reason: HOSPADM

## 2024-12-02 RX ORDER — ACETAMINOPHEN 650 MG/1
650 SUPPOSITORY RECTAL EVERY 6 HOURS PRN
Status: DISCONTINUED | OUTPATIENT
Start: 2024-12-02 | End: 2024-12-03 | Stop reason: HOSPADM

## 2024-12-02 RX ORDER — ATENOLOL 25 MG/1
50 TABLET ORAL DAILY
Status: DISCONTINUED | OUTPATIENT
Start: 2024-12-03 | End: 2024-12-03 | Stop reason: HOSPADM

## 2024-12-02 RX ORDER — VERAPAMIL HYDROCHLORIDE 240 MG/1
240 TABLET, FILM COATED, EXTENDED RELEASE ORAL DAILY
Status: DISCONTINUED | OUTPATIENT
Start: 2024-12-03 | End: 2024-12-03 | Stop reason: HOSPADM

## 2024-12-02 RX ORDER — PANTOPRAZOLE SODIUM 40 MG/10ML
40 INJECTION, POWDER, LYOPHILIZED, FOR SOLUTION INTRAVENOUS DAILY
Status: DISCONTINUED | OUTPATIENT
Start: 2024-12-03 | End: 2024-12-03 | Stop reason: HOSPADM

## 2024-12-02 RX ADMIN — DIPHENHYDRAMINE HYDROCHLORIDE 25 MG: 50 INJECTION INTRAMUSCULAR; INTRAVENOUS at 19:25

## 2024-12-02 RX ADMIN — SODIUM CHLORIDE 1000 ML: 9 INJECTION, SOLUTION INTRAVENOUS at 15:30

## 2024-12-02 RX ADMIN — ENOXAPARIN SODIUM 30 MG: 100 INJECTION SUBCUTANEOUS at 21:52

## 2024-12-02 RX ADMIN — IOPAMIDOL 75 ML: 755 INJECTION, SOLUTION INTRAVENOUS at 17:25

## 2024-12-02 RX ADMIN — Medication 10 ML: at 21:49

## 2024-12-02 RX ADMIN — SODIUM CHLORIDE 1000 ML: 9 INJECTION, SOLUTION INTRAVENOUS at 18:22

## 2024-12-02 RX ADMIN — METOCLOPRAMIDE 10 MG: 5 INJECTION, SOLUTION INTRAMUSCULAR; INTRAVENOUS at 19:25

## 2024-12-02 RX ADMIN — SODIUM CHLORIDE 950 ML: 9 INJECTION, SOLUTION INTRAVENOUS at 21:56

## 2024-12-02 RX ADMIN — METOCLOPRAMIDE HYDROCHLORIDE 10 MG: 5 INJECTION INTRAMUSCULAR; INTRAVENOUS at 15:34

## 2024-12-02 SDOH — ECONOMIC STABILITY: INCOME INSECURITY: IN THE LAST 12 MONTHS, WAS THERE A TIME WHEN YOU WERE NOT ABLE TO PAY THE MORTGAGE OR RENT ON TIME?: NO

## 2024-12-02 SDOH — ECONOMIC STABILITY: INCOME INSECURITY: HOW HARD IS IT FOR YOU TO PAY FOR THE VERY BASICS LIKE FOOD, HOUSING, MEDICAL CARE, AND HEATING?: NOT HARD AT ALL

## 2024-12-02 SDOH — ECONOMIC STABILITY: FOOD INSECURITY: WITHIN THE PAST 12 MONTHS, THE FOOD YOU BOUGHT JUST DIDN'T LAST AND YOU DIDN'T HAVE MONEY TO GET MORE.: NEVER TRUE

## 2024-12-02 SDOH — ECONOMIC STABILITY: FOOD INSECURITY: WITHIN THE PAST 12 MONTHS, YOU WORRIED THAT YOUR FOOD WOULD RUN OUT BEFORE YOU GOT MONEY TO BUY MORE.: NEVER TRUE

## 2024-12-02 ASSESSMENT — ENCOUNTER SYMPTOMS
ABDOMINAL PAIN: 1
DIARRHEA: 0
SHORTNESS OF BREATH: 0
COUGH: 0
VOMITING: 1
NAUSEA: 1

## 2024-12-02 ASSESSMENT — LIFESTYLE VARIABLES
HOW OFTEN DO YOU HAVE A DRINK CONTAINING ALCOHOL: MONTHLY OR LESS
HOW MANY STANDARD DRINKS CONTAINING ALCOHOL DO YOU HAVE ON A TYPICAL DAY: 3 OR 4

## 2024-12-02 ASSESSMENT — PAIN - FUNCTIONAL ASSESSMENT: PAIN_FUNCTIONAL_ASSESSMENT: NONE - DENIES PAIN

## 2024-12-02 ASSESSMENT — PAIN SCALES - GENERAL: PAINLEVEL_OUTOF10: 0

## 2024-12-02 NOTE — PROGRESS NOTES
2024     Sussy Casillas (:  1975) is a 48 y.o. female, here for evaluation of the following medical concerns:    JUDE Hi is here today for hospital follow-up visit.  She was admitted to [facility] 2024 through 2024 for nausea vomiting, headache, fever.  Reviewed discharge summary.  Continues to have daily fever, this morning 101.4 around 4 AM.  Continues to have nausea and vomiting.  The patient states over the last 48 hours she has been unable to hold down any of her medication.  She is taking small frequent sips of water and Gatorade, at times she is able to keep this down but in the afternoon and evening time she starts to have more episodes of vomiting and feels worse.   She continues to have epigastric pain.  She was prescribed pantoprazole but has not been able to keep this medication down.  Still with a generalized headache, not as severe as when she was admitted to the hospital but persisted.  States she feels absolutely miserable.  She has been taking Zofran ODT 4 mg every 8 hours as needed    Review of Systems   Constitutional:  Positive for chills and fatigue. Negative for fever.   Respiratory:  Negative for cough and shortness of breath.    Cardiovascular:  Negative for chest pain and leg swelling.   Gastrointestinal:  Positive for abdominal pain, nausea and vomiting. Negative for diarrhea.   Neurological:  Negative for dizziness and headaches.   All other systems reviewed and are negative.      Prior to Visit Medications    Medication Sig Taking? Authorizing Provider   ondansetron (ZOFRAN-ODT) 4 MG disintegrating tablet Take 1 tablet by mouth every 8 hours as needed for Nausea or Vomiting Yes Javi German,    scopolamine (TRANSDERM-SCOP) transdermal patch Place 1 patch onto the skin every 72 hours Yes Patricia Olivia DO   atenolol (TENORMIN) 50 MG tablet TAKE 1 TABLET BY MOUTH EVERY DAY Yes Bettina Patel APRN - CECY   levothyroxine

## 2024-12-02 NOTE — ED PROVIDER NOTES
THIS IS MY [facility] SUPERVISORY AND SHARED VISIT NOTE:    I personally saw the patient and made/approved the management plan and take responsibility for the patient management.    History: 48-year-old female presenting for evaluation of nausea, vomiting.  Patient had recent admission for nausea, vomiting, difficulty tolerating p.o.  She started to feel unwell past several days, difficulty with p.o. intake.  Sent in by PCP.    Exam: No abdominal distention, there is no peritoneal change, no respiratory distress intact distal pulses    MDM: 48-year-old female presenting for evaluation of concerns for dehydration, decreased oral intake, she had recent hospitalization for management of nausea, vomiting she was diagnosed with EBV.  She has had continued worsening symptoms.  Repeat CT abdomen pelvis here is unremarkable for acute intra-abdominal process.  She has required multiple rounds of nausea medicine, continue IV fluids for symptomatic management, she has had persistent tachycardia.  Because of the persistent symptoms, patient will be admitted for continued management.  She is agreeable with this plan      I personally saw the patient and independently provided 0 minutes of non-concurrent critical care out of the total shared critical care time provided.         No results found.      I, Dr. Ojeda, am the primary clinician of record.     Comment: Please note this report has been produced using speech recognition software and may contain errors related to that system including errors in grammar, punctuation, and spelling, as well as words and phrases that may be inappropriate. If there are any questions or concerns please feel free to contact the dictating provider for clarification.     Abhay Ojeda MD  12/02/24 2018    
specified.    DISCHARGE MEDICATIONS:  New Prescriptions    No medications on file       DISCONTINUED MEDICATIONS:  Discontinued Medications    No medications on file              (Please note that portions of this note were completed with a voice recognition program.  Efforts were made to edit the dictations but occasionally words are mis-transcribed.)    FARHAT Bhakta Jr (electronically signed)           Jeremy Love Jr., PA  12/02/24 [id number]

## 2024-12-03 ENCOUNTER — APPOINTMENT (OUTPATIENT)
Dept: ULTRASOUND IMAGING | Age: 49
DRG: 391 | End: 2024-12-03
Payer: COMMERCIAL

## 2024-12-03 VITALS
RESPIRATION RATE: 18 BRPM | DIASTOLIC BLOOD PRESSURE: 78 MMHG | TEMPERATURE: 98.9 F | BODY MASS INDEX: 36.9 KG/M2 | SYSTOLIC BLOOD PRESSURE: 177 MMHG | WEIGHT: 229.6 LBS | HEIGHT: 66 IN | HEART RATE: 83 BPM | OXYGEN SATURATION: 97 %

## 2024-12-03 PROBLEM — E43 SEVERE MALNUTRITION (HCC): Status: ACTIVE | Noted: 2024-12-03

## 2024-12-03 LAB
ALBUMIN SERPL-MCNC: 3.7 G/DL (ref 3.4–5)
ALBUMIN/GLOB SERPL: 1.1 {RATIO} (ref 1.1–2.2)
ALP SERPL-CCNC: 174 U/L (ref 40–129)
ALT SERPL-CCNC: 142 U/L (ref 10–40)
ANION GAP SERPL CALCULATED.3IONS-SCNC: 10 MMOL/L (ref 3–16)
AST SERPL-CCNC: 96 U/L (ref 15–37)
BASOPHILS # BLD: 0.1 K/UL (ref 0–0.2)
BASOPHILS NFR BLD: 0.9 %
BILIRUB SERPL-MCNC: 0.3 MG/DL (ref 0–1)
BUN SERPL-MCNC: 5 MG/DL (ref 7–20)
CALCIUM SERPL-MCNC: 8.7 MG/DL (ref 8.3–10.6)
CHLORIDE SERPL-SCNC: 100 MMOL/L (ref 99–110)
CO2 SERPL-SCNC: 29 MMOL/L (ref 21–32)
CREAT SERPL-MCNC: 0.7 MG/DL (ref 0.6–1.1)
DEPRECATED RDW RBC AUTO: 15.8 % (ref 12.4–15.4)
EOSINOPHIL # BLD: 0.1 K/UL (ref 0–0.6)
EOSINOPHIL NFR BLD: 1 %
GFR SERPLBLD CREATININE-BSD FMLA CKD-EPI: >90 ML/MIN/{1.73_M2}
GLUCOSE SERPL-MCNC: 102 MG/DL (ref 70–99)
HCT VFR BLD AUTO: 33.6 % (ref 36–48)
HGB BLD-MCNC: 11 G/DL (ref 12–16)
LYMPHOCYTES # BLD: 1.8 K/UL (ref 1–5.1)
LYMPHOCYTES NFR BLD: 29.6 %
MCH RBC QN AUTO: 25.7 PG (ref 26–34)
MCHC RBC AUTO-ENTMCNC: 32.6 G/DL (ref 31–36)
MCV RBC AUTO: 78.6 FL (ref 80–100)
MONOCYTES # BLD: 0.8 K/UL (ref 0–1.3)
MONOCYTES NFR BLD: 12.6 %
NEUTROPHILS # BLD: 3.5 K/UL (ref 1.7–7.7)
NEUTROPHILS NFR BLD: 55.9 %
PLATELET # BLD AUTO: 277 K/UL (ref 135–450)
PMV BLD AUTO: 7.6 FL (ref 5–10.5)
POTASSIUM SERPL-SCNC: 3.8 MMOL/L (ref 3.5–5.1)
PROT SERPL-MCNC: 7.1 G/DL (ref 6.4–8.2)
RBC # BLD AUTO: 4.28 M/UL (ref 4–5.2)
SODIUM SERPL-SCNC: 139 MMOL/L (ref 136–145)
WBC # BLD AUTO: 6.2 K/UL (ref 4–11)

## 2024-12-03 PROCEDURE — 6370000000 HC RX 637 (ALT 250 FOR IP): Performed by: INTERNAL MEDICINE

## 2024-12-03 PROCEDURE — 2580000003 HC RX 258: Performed by: INTERNAL MEDICINE

## 2024-12-03 PROCEDURE — 85025 COMPLETE CBC W/AUTO DIFF WBC: CPT

## 2024-12-03 PROCEDURE — 36415 COLL VENOUS BLD VENIPUNCTURE: CPT

## 2024-12-03 PROCEDURE — 76705 ECHO EXAM OF ABDOMEN: CPT

## 2024-12-03 PROCEDURE — 80053 COMPREHEN METABOLIC PANEL: CPT

## 2024-12-03 PROCEDURE — 6360000002 HC RX W HCPCS: Performed by: INTERNAL MEDICINE

## 2024-12-03 RX ORDER — PROCHLORPERAZINE EDISYLATE 5 MG/ML
10 INJECTION INTRAMUSCULAR; INTRAVENOUS EVERY 6 HOURS PRN
Status: DISCONTINUED | OUTPATIENT
Start: 2024-12-03 | End: 2024-12-03 | Stop reason: HOSPADM

## 2024-12-03 RX ORDER — PROCHLORPERAZINE MALEATE 10 MG
10 TABLET ORAL EVERY 6 HOURS PRN
Qty: 120 TABLET | Refills: 0 | Status: SHIPPED | OUTPATIENT
Start: 2024-12-03 | End: 2025-01-02

## 2024-12-03 RX ORDER — SCOLOPAMINE TRANSDERMAL SYSTEM 1 MG/1
1 PATCH, EXTENDED RELEASE TRANSDERMAL
Qty: 5 PATCH | Refills: 0 | Status: SHIPPED | OUTPATIENT
Start: 2024-12-03

## 2024-12-03 RX ADMIN — ACETAMINOPHEN 650 MG: 325 TABLET ORAL at 00:02

## 2024-12-03 RX ADMIN — OXYCODONE HYDROCHLORIDE AND ACETAMINOPHEN 500 MG: 500 TABLET ORAL at 09:18

## 2024-12-03 RX ADMIN — PROCHLORPERAZINE EDISYLATE 10 MG: 5 INJECTION INTRAMUSCULAR; INTRAVENOUS at 06:29

## 2024-12-03 RX ADMIN — PANTOPRAZOLE SODIUM 40 MG: 40 INJECTION, POWDER, FOR SOLUTION INTRAVENOUS at 09:18

## 2024-12-03 RX ADMIN — ATENOLOL 50 MG: 25 TABLET ORAL at 09:18

## 2024-12-03 RX ADMIN — ONDANSETRON 4 MG: 2 INJECTION INTRAMUSCULAR; INTRAVENOUS at 02:11

## 2024-12-03 RX ADMIN — LOSARTAN POTASSIUM 100 MG: 100 TABLET, FILM COATED ORAL at 09:18

## 2024-12-03 RX ADMIN — ASPIRIN 81 MG: 81 TABLET, COATED ORAL at 09:18

## 2024-12-03 RX ADMIN — SODIUM CHLORIDE: 9 INJECTION, SOLUTION INTRAVENOUS at 12:00

## 2024-12-03 RX ADMIN — Medication 20 ML: at 09:20

## 2024-12-03 RX ADMIN — PROCHLORPERAZINE EDISYLATE 10 MG: 5 INJECTION INTRAMUSCULAR; INTRAVENOUS at 13:58

## 2024-12-03 RX ADMIN — LEVOTHYROXINE SODIUM 75 MCG: 0.03 TABLET ORAL at 09:18

## 2024-12-03 RX ADMIN — ENOXAPARIN SODIUM 30 MG: 100 INJECTION SUBCUTANEOUS at 09:19

## 2024-12-03 RX ADMIN — ONDANSETRON 4 MG: 2 INJECTION INTRAMUSCULAR; INTRAVENOUS at 09:19

## 2024-12-03 ASSESSMENT — PAIN - FUNCTIONAL ASSESSMENT: PAIN_FUNCTIONAL_ASSESSMENT: PREVENTS OR INTERFERES SOME ACTIVE ACTIVITIES AND ADLS

## 2024-12-03 ASSESSMENT — PAIN SCALES - GENERAL
PAINLEVEL_OUTOF10: 4
PAINLEVEL_OUTOF10: 4
PAINLEVEL_OUTOF10: 2

## 2024-12-03 ASSESSMENT — PAIN DESCRIPTION - LOCATION: LOCATION: GENERALIZED

## 2024-12-03 ASSESSMENT — PAIN DESCRIPTION - DESCRIPTORS: DESCRIPTORS: ACHING

## 2024-12-03 ASSESSMENT — PAIN DESCRIPTION - ONSET: ONSET: ON-GOING

## 2024-12-03 ASSESSMENT — PAIN DESCRIPTION - ORIENTATION: ORIENTATION: OTHER (COMMENT)

## 2024-12-03 ASSESSMENT — PAIN DESCRIPTION - PAIN TYPE: TYPE: ACUTE PAIN

## 2024-12-03 ASSESSMENT — PAIN DESCRIPTION - FREQUENCY: FREQUENCY: INTERMITTENT

## 2024-12-03 NOTE — PROGRESS NOTES
12/03/24 1408   Encounter Summary   Encounter Overview/Reason Advance Care Planning   Last Encounter    (12/3: Sussy PORTILLO at time of attempt, chart review, see ACP note.)   Begin Time 1400   End Time  1409   Total Time Calculated 9 min   Advance Care Planning   Type   (Chart Review)     Thank you for consulting Spiritual Health    If you would like a 's presence for emotional, spiritual, grief or comfort care,   please dial \"0\" and ask for the  on-call to be paged.    For help with Advanced Care Planning, Power of  for Healthcare or Living Will forms, you may also call us directly:    [phone] Gerald  [phone]) Satya  [phone]) Outpatient    [facility]      
4 Eyes Skin Assessment and Patient belongings     The patient is being assess for  Admission    I agree that 2 Nurses have performed a thorough Head to Toe Skin Assessment on the patient. ALL assessment sites listed below have been assessed.       Areas assessed by both nurses:   [x]   Head, Face, and Ears   [x]   Shoulders, Back, and Chest  [x]   Arms, Elbows, and Hands   [x]   Coccyx, Sacrum, and IschIum  [x]   Legs, Feet, and Heels        Does the Patient have Skin Breakdown?  No         Surya Prevention initiated:  NA   Wound Care Orders initiated:  NA      [facility] nurse consulted for Pressure Injury (Stage 3,4, Unstageable, DTI, NWPT, and Complex wounds), New and Established Ostomies:  NA      I agree that 2 Nurses have reviewed patient belongings with the patient/family and documented in the flowsheet upon admission or transfer to the unit.     Belongings  Dental Appliances: None  Vision - Corrective Lenses: Eyeglasses  Hearing Aid: None  Clothing: Pants, Shirt, Sweater, Jacket/Coat  Jewelry: Earrings  Electronic Devices: Cell Phone,   Weapons (Notify Protective Services/Security): None  Other Valuables: Wallet (has 60 dollars per pt)  Home Medications: None  Valuables Given To: Patient  Provide Name(s) of Who Valuable(s) Were Given To: Sussy Casillas       Nurse 1 eSignature: Electronically signed by RO LEIVA RN on 12/3/24 at 1:36 AM EST    **SHARE this note so that the co-signing nurse is able to place an eSignature**    Nurse 2 eSignature: Electronically signed by Hemalatha Mojica RN on 12/3/24 at 6:38 AM EST   
Comprehensive Nutrition Assessment    Type and Reason for Visit:  Initial, Positive nutrition screen (MST: 2)    Nutrition Recommendations/Plan:   Continue regular diet.   Modify Ensure TID to Ensure Clear TID (no flavor preference).   Encourage PO intake as tolerated.   Monitor nutrition adequacy, pertinent labs, bowel habits, wt changes, and clinical progress      Malnutrition Assessment:  Malnutrition Status:  Severe malnutrition (12/03/24 1203)    Context:  Acute Illness     Findings of the 6 clinical characteristics of malnutrition:  Energy Intake:  50% or less of estimated energy requirements for 5 or more days  Weight Loss:  Greater than 2% over 1 week (3% in 1.5 weeks)     Body Fat Loss:  No body fat loss     Muscle Mass Loss:  Unable to assess    Fluid Accumulation:  Unable to assess (none documented)     Strength:  Not Performed    Nutrition Assessment:    Pt visited for initial + positive screen. Admitted w/ N/V/D and fatigue but found to have elevated liver enzymes. Pt had recent discharge on 11/28 for similar symptoms but was found to have JANENE and Jacinta-Barr virus. PMH of CAD, GERD, HLD, HTN, and intractable N/V. Per Internal Medicine note today, pt states she has some epigastric abdominal pain. Pt reports that her appetite has been \"non-existent\" the past 2 weeks since she \"can't keep anything down.\" Per EMR, pt ate 0% of breakfast today. EMR suggests significant loss of 3% in ~1.5 weeks. Reports emesis this morning and diarrhea but denies constipation. Disgreeable to Ensure TID but agreeable to Ensure Clear TID. Encouraged PO intake from meals before ONS - pt voiced understanding. Will continue to monitor.    Nutrition Related Findings:    Last BM documented 12/02. Active BS. BUN: 5 mg/dL. No edema documented. Wound Type: None       Current Nutrition Intake & Therapies:    Average Meal Intake: 0%  Average Supplements Intake: Unable to assess  ADULT DIET; Regular  ADULT ORAL NUTRITION SUPPLEMENT; 
Patient admitted to room 365 from [facility] ED.  Patient oriented to room, call light, bed rails, phone, lights and bathroom.  Patient instructed about the schedule of the day including: vital sign frequency, lab draws, possible tests, frequency of MD and staff rounds, including RN/MD rounding together at bedside, daily weights, and I &O's.  Patient instructed about prescribed diet, how to use 8MENU, and television.   bed alarm in place, patient aware of placement and reason.   .Bed locked, in lowest position, side rails up 2/4, call light within reach.  Will continue to monitor.     
Patient discharged to home. IV removed with no complications. Discharge education complete with verbal understanding. All belongings sent home with patient. Patient transported via wheelchair to private vehicle.  
Screened patient for SBIRT and SDoH. No KADIE or social needs concerns at this time.   
was performed with the administration of intravenous contrast. Multiplanar reformatted images are provided for review. Automated exposure control, iterative reconstruction, and/or weight based adjustment of the mA/kV was utilized to reduce the radiation dose to as low as reasonably achievable. COMPARISON: CT abdomen and pelvis 11/22/2024. HISTORY: ORDERING SYSTEM PROVIDED HISTORY: epigastric pain TECHNOLOGIST PROVIDED HISTORY: Reason for exam:->epigastric pain Additional Contrast?->None Decision Support Exception - unselect if not a suspected or confirmed emergency medical condition->Emergency Medical Condition (MA) Reason for Exam: vomiting for 3 days, fever. mid upper abdominal pain Additional signs and symptoms: Initial IV started to have raised area, started a new IV and resumed injection without complication Relevant Medical/Surgical History: appy FINDINGS: Lower Chest: Coronary artery atherosclerotic vascular calcifications.  Images through the lung bases demonstrate no acute process. Organs: The liver and gallbladder are unremarkable.  No biliary ductal dilatation is identified.  The pancreas, spleen, and bilateral adrenal glands are unremarkable.  The bilateral kidneys and ureters are unremarkable. GI/Bowel: No evidence of acute appendicitis.  The colon is unremarkable.  The stomach and small bowel are normal in appearance.  No obstruction or wall thickening identified. Pelvis: The urinary bladder is normal in appearance.  A posterior uterine body fibroid is seen.  The bilateral adnexae are unremarkable.  No free fluid in the pelvis.  No pelvic or inguinal lymphadenopathy. Peritoneum/Retroperitoneum: The abdominal aorta is normal in appearance.  No acute vascular abnormality identified.  No retroperitoneal or mesenteric lymphadenopathy is identified.  No free air or fluid is seen in the abdomen. Bones/Soft Tissues: No acute osseous or soft tissue abnormality.     No acute abnormality in the abdomen or

## 2024-12-03 NOTE — PLAN OF CARE
Problem: Discharge Planning  Goal: Discharge to home or other facility with appropriate resources  Outcome: Progressing     Problem: Safety - Adult  Goal: Free from fall injury  Outcome: Progressing     Problem: Skin/Tissue Integrity - Adult  Goal: Skin integrity remains intact  Outcome: Progressing     Problem: Gastrointestinal - Adult  Goal: Minimal or absence of nausea and vomiting  Outcome: Progressing     Problem: Metabolic/Fluid and Electrolytes - Adult  Goal: Electrolytes maintained within normal limits  Outcome: Progressing

## 2024-12-03 NOTE — CARE COORDINATION
Case Management Assessment  Initial Evaluation    Date/Time of Evaluation: 12/3/2024 1:17 PM  Assessment Completed by: Yara Ontiveros RN    If patient is discharged prior to next notation, then this note serves as note for discharge by case management.    Patient Name: Sussy Casillas                   YOB: 1975  Diagnosis: EBV infection [B27.90]  Transaminitis [R74.01]  Elevated liver enzymes [R74.8]  Nausea and vomiting, unspecified vomiting type [R11.2]                   Date / Time: 12/2/2024  2:15 PM    Patient Admission Status: Inpatient   Readmission Risk (Low < 19, Mod (19-27), High > 27): Readmission Risk Score: 17.5    Current PCP: Bettina Patel APRN - CNP  PCP verified by CM? Yes    Chart Reviewed: Yes      History Provided by: Patient  Patient Orientation: Alert and Oriented    Patient Cognition: Alert    Hospitalization in the last 30 days (Readmission):  Yes    If yes, Readmission Assessment in CM Navigator will be completed.    Advance Directives:      Code Status: Full Code   Patient's Primary Decision Maker is: Legal Next of Kin    Primary Decision Maker: Lise Casillas - Child - [phone]    Secondary Decision Maker: VinnyEli - Parent - [phone]    Discharge Planning:    Patient lives with: Parent Type of Home: Apartment  Primary Care Giver: Self  Patient Support Systems include: Children, Family Members   Current Financial resources: Medicare  Current community resources: None  Current services prior to admission: None            Current DME:              Type of Home Care services:  None    ADLS  Prior functional level: Independent in ADLs/IADLs  Current functional level: Independent in ADLs/IADLs    PT AM-PAC:   /24  OT AM-PAC:   /24    Family can provide assistance at DC: Yes  Would you like Case Management to discuss the discharge plan with any other family members/significant others, and if so, who? No  Plans to Return to Present Housing:

## 2024-12-03 NOTE — ED NOTES
Sussy Casillas is a 48 y.o. female admitted for  Principal Problem:    Elevated liver enzymes  Resolved Problems:    * No resolved hospital problems. *  .   Patient Home via self with   Chief Complaint   Patient presents with    Dehydration     Discharged Thursday after 6 day admit for fever and vomiting. Followed up with PCP today who sent patient here due to dehydration. Pt states she has not been able to keep anything down, including meds prescribed on discharge.    .  Patient is alert and Person, Place, Time, and Situation  Patient's baseline mobility: Baseline Mobility: Independent   Code Status: Prior   Cardiac Rhythm:       Is patient on baseline Oxygen: no how many Liters:   Abnormal Assessment Findings:     Isolation: None      NIH Score:    C-SSRS: Risk of Suicide: No Risk  Bedside swallow:        Active LDA's:   Peripheral IV 12/02/24 Left;Proximal;Anterior Forearm (Active)   Site Assessment Clean, dry & intact 12/02/24 1723   Line Status Blood return noted;Brisk blood return;Flushed;Normal saline locked 12/02/24 1723   Dressing Status New dressing applied;Clean, dry & intact 12/02/24 1723   Dressing Type Transparent 12/02/24 1723   Dressing Intervention New 12/02/24 1723     Patient admitted with a lopez: no If the lopez is chronic was it exchanged:  Reason for lopez:   Patient admitted with Central Line:  NA . PICC line placement confirmed: YES OR NO:[id number]}   Reason for Central line:   Was central line Inserted from an outside facility:        Family/Caregiver Present no Any Concerns: no   Restraints no  Sitter no         Vitals: MEWS Score: 3    Vitals:    12/02/24 1831 12/02/24 1902 12/02/24 2002 12/02/24 2028   BP: (!) 173/83 (!) 177/80 (!) 177/75    Pulse:    92   Resp:       Temp:       TempSrc:       SpO2: 99% 98% 98%    Weight:       Height:           Last documented pain score (0-10 scale)    Pain medication administered No.    Pertinent or High Risk Medications/Drips: No.    Pending Blood

## 2024-12-03 NOTE — DISCHARGE SUMMARY
V2.0  Discharge Summary    Name:  Sussy Casillas /Age/Sex: 1975 (48 y.o. female)   Admit Date: 2024  Discharge Date: 12/3/24    MRN & CSN:  [id number] & [id number] Encounter Date and Time 12/3/24 11:12 AM EST    Attending:  Curt Shea MD Discharging Provider: Javi German DO       [place] [facility] Course:     Brief HPI: Sussy Casillas is a 48 y.o. female with past medical history of hypertension, hypothyroidism, GERD who presented with nausea and vomiting.  She was recently admitted to the hospital and discharged on  for similar symptoms.  At that time, patient was diagnosed with a viral syndrome.  Her Jacinta-Barr virus panel did come back positive.  Patient's oral intake has still remained decreased and was still having fever as high as 101.4 outpatient.  When seen by her PCP, she was advised to come to the hospital for evaluation.  ED workup showed stable creatinine at 0.7, 107 glucose, CBC within normal limits.  CRP was still elevated but less than before down from 32.7 to 13.3.  Her liver enzymes have increased with alkaline phosphatase 242, , .  Hepatitis panels were negative.  UA unremarkable.  CT abdomen and pelvis showed no acute abnormalities.  Patient was admitted for further workup of elevated liver enzymes.     Brief Problem Based Course:     Elevated liver enzymes  - and  on presentation, decreased to 96 and 142 on 12/3  -Lipase within normal limits  -CT abdomen pelvis showed no acute findings  -Right upper quadrant ultrasound WNL  -Likely secondary to EBV infection.  Discussed with patient that symptoms of EBV may persist for weeks.  Continue supportive care outpatient. Check liver enzymes in approximately 1 week with PCP     Nausea and vomiting  Headache  -Concern for viral syndrome versus EBV versus viral gastroenteritis contributing to symptoms  -CT abdomen pelvis showed no acute finding  -Discharge with Compazine as needed     History of

## 2024-12-03 NOTE — ACP (ADVANCE CARE PLANNING)
Advance Care Planning     Advance Care Planning Inpatient Note  Spiritual Care Department    Today's Date: 12/3/2024  Unit: [facility] B3 - MED SURG    Received request from patient.  Upon review of chart and communication with care team, patient's decision making abilities are not in question.. Patient was present in the room during visit.    Goals of ACP Conversation:  Discuss advance care planning documents  Facilitate a discussion related to patient's goals of care as they align with the patient's values and beliefs.    Health Care Decision Makers:      Primary Decision Maker: Thelma Dodd - Child - [phone]    Primary Decision Maker: VinnyLise - Child - [phone]    Secondary Decision Maker: VinnyEli - Parent - [phone]  Summary:  Verified Healthcare Decision Maker  Updated Healthcare Decision Maker    Follow up on ACP conversation.  Today I spoke with Sussy about decision making for healthcare.  She had indicated that she had questions about ACP when she came into the hospital, so I explained decision making and [place]'s Hierarchy.  At this time, her wishes are consistent with NOK laws in [place].      Advance Care Planning Documents (Patient Wishes):  None       Electronically signed by Chaplain Lenka on 12/3/2024 at 4:13 PM        Thank you for consulting [facility]    If you would like a 's presence for emotional, spiritual, grief or comfort care,   please dial \"0\" and ask for the  on-call to be paged.    For help with Advanced Care Planning, Power of  for Healthcare or Living Will forms, you may also call us directly:    [phone]) Gerald  [phone]) Satya  [phone]) Outpatient    [facility]

## 2024-12-03 NOTE — ACP (ADVANCE CARE PLANNING)
Advance Care Planning     General Advance Care Planning (ACP)    Date of Conversation: 12/3/2024  Chart Review  Other persons present: None    Healthcare Decision Maker:   Primary Decision Maker: Lise Casillas - Child - [phone]    Secondary Decision Maker: Eli Casillas - Parent - [phone]     Today we reviewed patient chart to determine NOK and healthcare decision makers.  Sussy was out of her room at time of attempt, Spiritual Health will follow up at a later time to attempt to discuss Healthcare Decision making preferences and complete an ACP conversation.  Content/Action Overview:  No ACP documents on-file at this time.    At this time Sussy's chart reads that she has two family members that she would like to be decision maker for her: daughter Lise & mother Eli.  Without completed HCPOA document, nearest of kin would be default decision makers.      Please reach out to Spiritual Health/Chaplains if Sussy wishes to complete documents or has questions about decision-making hierarchy.       Nilo Antonio       Thank you for consulting [facility]    If you would like a 's presence for emotional, spiritual, grief or comfort care,   please dial \"0\" and ask for the  on-call to be paged.    For help with Advanced Care Planning, Power of  for Healthcare or Living Will forms, you may also call us directly:    [phone]) Gerald  [phone]) Satya  [phone]) Outpatient    [facility]

## 2024-12-03 NOTE — PLAN OF CARE
Problem: Discharge Planning  Goal: Discharge to home or other facility with appropriate resources  Outcome: Adequate for Discharge     Problem: Safety - Adult  Goal: Free from fall injury  Outcome: Adequate for Discharge     Problem: Pain  Goal: Verbalizes/displays adequate comfort level or baseline comfort level  Outcome: Adequate for Discharge     Problem: Skin/Tissue Integrity - Adult  Goal: Skin integrity remains intact  Outcome: Adequate for Discharge     Problem: Musculoskeletal - Adult  Goal: Return mobility to safest level of function  Outcome: Adequate for Discharge     Problem: Gastrointestinal - Adult  Goal: Minimal or absence of nausea and vomiting  Outcome: Adequate for Discharge  Goal: Maintains adequate nutritional intake  Outcome: Adequate for Discharge     Problem: Genitourinary - Adult  Goal: Absence of urinary retention  Outcome: Adequate for Discharge  Flowsheets (Taken 12/3/2024 0909)  Absence of urinary retention: Assess patient’s ability to void and empty bladder     Problem: Infection - Adult  Goal: Absence of infection at discharge  Outcome: Adequate for Discharge     Problem: Metabolic/Fluid and Electrolytes - Adult  Goal: Electrolytes maintained within normal limits  Outcome: Adequate for Discharge     Problem: Nutrition Deficit:  Goal: Optimize nutritional status  Outcome: Adequate for Discharge

## 2024-12-03 NOTE — H&P
30-39.9) 08/12/2017    ABHILASH on CPAP 06/06/2018       Past Surgical History:        Procedure Laterality Date    APPENDECTOMY      CORONARY ANGIOPLASTY WITH STENT PLACEMENT      JOINT REPLACEMENT  06/30/2022 & 11/29/2022    KNEE ARTHROSCOPY      KNEE ARTHROSCOPY Left 03/06/2019    medial menisectomy    KNEE ARTHROSCOPY Left 03/06/2019    LEFT KNEE VIDEO ARTHROSCOPY, PARTIAL MEDIAL MENISCECTOMY, CHONDROPLASTY performed by Trudy Hoffmann DO at [facility] OR    LEG SURGERY      NECK SURGERY      SPINE SURGERY      neck    TONSILLECTOMY      TOTAL KNEE ARTHROPLASTY  06/20/2022    TOTAL KNEE ARTHROPLASTY Right 11/29/2022       Medications Prior to Admission:   Prior to Admission medications    Medication Sig Start Date End Date Taking? Authorizing Provider   ondansetron (ZOFRAN-ODT) 4 MG disintegrating tablet Take 1 tablet by mouth every 8 hours as needed for Nausea or Vomiting 11/28/24   Javi German DO   benzonatate (TESSALON) 100 MG capsule Take 1 capsule by mouth 3 times daily as needed for Cough  Patient not taking: Reported on 12/2/2024 11/28/24 12/5/24  Javi German DO   pantoprazole (PROTONIX) 40 MG tablet Take 1 tablet by mouth every morning (before breakfast)  Patient not taking: Reported on 12/2/2024 11/29/24   Javi German DO   scopolamine (TRANSDERM-SCOP) transdermal patch Place 1 patch onto the skin every 72 hours 9/13/24   Patricia Olivia DO   atenolol (TENORMIN) 50 MG tablet TAKE 1 TABLET BY MOUTH EVERY DAY 9/9/24   Bettina Patel APRN - CNP   levothyroxine (SYNTHROID) 75 MCG tablet Take 1 tablet by mouth daily 6/13/24   Bettina Patel APRN - CNP   rosuvastatin (CRESTOR) 40 MG tablet TAKE 1 TABLET BY MOUTH IN THE EVENING 2/13/24   Pillo Rodriguez MD   verapamil (CALAN SR) 240 MG extended release tablet Take 1 tablet by mouth daily 2/12/24   Pillo Rodriguez MD   losartan (COZAAR) 100 MG tablet TAKE 1 TABLET BY MOUTH EVERY DAY 1/4/24   Pillo Rodriguez MD

## 2024-12-04 LAB
HAV IGM SERPL QL IA: NORMAL
HBV CORE IGM SERPL QL IA: NORMAL
HBV SURFACE AG SERPL QL IA: NORMAL
HCV AB SERPL QL IA: NORMAL

## 2025-01-13 RX ORDER — PROCHLORPERAZINE MALEATE 10 MG
TABLET ORAL
Qty: 120 TABLET | Refills: 0 | OUTPATIENT
Start: 2025-01-13

## 2025-01-13 NOTE — TELEPHONE ENCOUNTER
Refill Request     Last Seen: Visit date not found    Last Written: 12/3/24    Next Appointment:   No future appointments.          Requested Prescriptions     Pending Prescriptions Disp Refills    prochlorperazine (COMPAZINE) 10 MG tablet [Pharmacy Med Name: Prochlorperazine Maleate Oral Tablet 10 MG] 120 tablet 0     Sig: TAKE 1 TABLET BY MOUTH EVERY 6 HOURS AS NEEDED FOR NAUSEA AND VOMITING

## 2025-01-17 RX ORDER — LOSARTAN POTASSIUM 100 MG/1
TABLET ORAL
Qty: 30 TABLET | Refills: 0 | Status: SHIPPED | OUTPATIENT
Start: 2025-01-17

## 2025-01-17 NOTE — TELEPHONE ENCOUNTER
Front please call pt and jeremy yrly f/u     Last Office Visit: 2/12/24 [place] [facility] [place]  **Is [place]? No    Next Office Visit: no  **If no OV, when does pt need to be seen? in 1 year(s)  **Has patient already had 30 day supply? No    Lab orders needed? no   Encounter provider correct? Yes If not, change provider  Script changes since last refill? no    LAST LABS:   CMP:   Lab Results   Component Value Date     12/03/2024    K 3.8 12/03/2024     12/03/2024    CO2 29 12/03/2024    BUN 5 (L) 12/03/2024    CREATININE 0.7 12/03/2024    GLUCOSE 102 (H) 12/03/2024    CALCIUM 8.7 12/03/2024    BILITOT 0.3 12/03/2024    ALKPHOS 174 (H) 12/03/2024    AST 96 (H) 12/03/2024     (H) 12/03/2024    LABGLOM >90 12/03/2024    GFRAA >60 06/23/2022    AGRATIO 1.1 12/03/2024    GLOB 3.4 02/26/2021

## 2025-02-13 ENCOUNTER — OFFICE VISIT (OUTPATIENT)
Age: 50
End: 2025-02-13

## 2025-02-13 VITALS
WEIGHT: 235 LBS | HEIGHT: 66 IN | BODY MASS INDEX: 37.77 KG/M2 | SYSTOLIC BLOOD PRESSURE: 132 MMHG | DIASTOLIC BLOOD PRESSURE: 82 MMHG | HEART RATE: 67 BPM | OXYGEN SATURATION: 96 %

## 2025-02-13 DIAGNOSIS — I25.118 CORONARY ARTERY DISEASE INVOLVING NATIVE CORONARY ARTERY OF NATIVE HEART WITH OTHER FORM OF ANGINA PECTORIS (HCC): Primary | Chronic | ICD-10-CM

## 2025-02-13 DIAGNOSIS — R74.8 ELEVATED LIVER ENZYMES: ICD-10-CM

## 2025-02-13 DIAGNOSIS — I10 PRIMARY HYPERTENSION: Chronic | ICD-10-CM

## 2025-02-13 DIAGNOSIS — E78.2 MIXED HYPERLIPIDEMIA: Chronic | ICD-10-CM

## 2025-02-13 DIAGNOSIS — Z87.891 FORMER SMOKER: Chronic | ICD-10-CM

## 2025-02-13 NOTE — PROGRESS NOTES
consistent with infarct.  There is a small, mild intensity, fixed inferolateral defect consistent with  diaphragmatic attenuation artifact.    No evidence of myocardium at risk for significant reversible ischemia     Stress test 2019  IMPRESSION:   This is a normal study   Normal EF   The right ventricular size and function appears normal.     Good quality study   Attenuation artifact absent.   No prior studies available for comparison.   The risk of this study is low     Stress test 2/27/2021  Conclusions    Summary  There is normal isotope uptake at stress and rest.  No evidence of myocardium at risk for significant reversible ischemia.    Normal LV function.    Overall findings represent a low risk scan.       Echocardiogram 2/23/2022  Summary   Patient had severe chest pain at 10: 00, stopped echo and activated rapid   response. Restated started test at 10:36.   Frequent PVCs seen during 2nd portion of echo.   -- Normal left ventricle size, wall thickness (upper limit of normal) and   systolic function with an estimated ejection fraction of 60-65%. No regional   wall motion abnormalities are seen. Normal left ventricular diastolic   filling pressure.   -- Normal wall motion during chest pain.   -- Trace mitral and tricuspid regurgitation.    Left heart cath 2/23/2022  Procedure Findings:  1. Mild non-obstructive coronary artery disease.              ~patent LAD stent  2. Normal left ventricular function with EF estimated at 55-60%  3. Normal left heart hemodynamics     Latest Reference Range & Units 12/06/23 09:20   Cholesterol, Total 0 - 199 mg/dL 165   HDL Cholesterol 40 - 60 mg/dL 43   LDL Calculated <100 mg/dL 84   Triglycerides 0 - 150 mg/dL 189 (H)   VLDL Cholesterol Calculated Not Established mg/dL 38      Latest Reference Range & Units 12/03/24 05:48   Albumin 3.4 - 5.0 g/dL 3.7   Albumin/Globulin Ratio 1.1 - 2.2  1.1   Alkaline Phosphatase 40 - 129 U/L 174 (H)   ALT 10 - 40 U/L 142 (H)   AST 15 - 37

## 2025-02-13 NOTE — PATIENT INSTRUCTIONS
~Fasting lipids and CMP   ~LDL goal is less than 70. If LDL is not to goal would add Zetia 10 mg daily     Cardiac medications reviewed including indications and pertinent side effects. Medication list updated at this visit.   Patient verbalizes understanding of the need for treatment and education has been provided at today's visit. Additional education material will be provided in after visit summary.    Check blood pressure and heart rate at home a few times per week- keep a log with dates and times and bring to office visit   Regular exercise and following a healthy diet encouraged   Follow up with me in 1 year

## 2025-02-14 DIAGNOSIS — E78.2 MIXED HYPERLIPIDEMIA: Chronic | ICD-10-CM

## 2025-02-14 LAB
ALBUMIN SERPL-MCNC: 4.2 G/DL (ref 3.4–5)
ALBUMIN/GLOB SERPL: 1.3 {RATIO} (ref 1.1–2.2)
ALP SERPL-CCNC: 80 U/L (ref 40–129)
ALT SERPL-CCNC: 36 U/L (ref 10–40)
ANION GAP SERPL CALCULATED.3IONS-SCNC: 12 MMOL/L (ref 3–16)
AST SERPL-CCNC: 32 U/L (ref 15–37)
BILIRUB SERPL-MCNC: <0.2 MG/DL (ref 0–1)
BUN SERPL-MCNC: 11 MG/DL (ref 7–20)
CALCIUM SERPL-MCNC: 9.8 MG/DL (ref 8.3–10.6)
CHLORIDE SERPL-SCNC: 102 MMOL/L (ref 99–110)
CHOLEST SERPL-MCNC: 230 MG/DL (ref 0–199)
CO2 SERPL-SCNC: 26 MMOL/L (ref 21–32)
CREAT SERPL-MCNC: 0.7 MG/DL (ref 0.6–1.1)
GFR SERPLBLD CREATININE-BSD FMLA CKD-EPI: >90 ML/MIN/{1.73_M2}
GLUCOSE SERPL-MCNC: 108 MG/DL (ref 70–99)
HDLC SERPL-MCNC: 33 MG/DL (ref 40–60)
LDLC SERPL CALC-MCNC: ABNORMAL MG/DL
LDLC SERPL-MCNC: 125 MG/DL
POTASSIUM SERPL-SCNC: 4.3 MMOL/L (ref 3.5–5.1)
PROT SERPL-MCNC: 7.4 G/DL (ref 6.4–8.2)
SODIUM SERPL-SCNC: 140 MMOL/L (ref 136–145)
TRIGL SERPL-MCNC: 370 MG/DL (ref 0–150)
VLDLC SERPL CALC-MCNC: ABNORMAL MG/DL

## 2025-02-14 RX ORDER — NITROGLYCERIN 0.4 MG/1
TABLET SUBLINGUAL
Qty: 25 TABLET | Refills: 3 | Status: SHIPPED | OUTPATIENT
Start: 2025-02-14

## 2025-02-14 RX ORDER — LOSARTAN POTASSIUM 100 MG/1
100 TABLET ORAL DAILY
Qty: 90 TABLET | Refills: 3 | Status: SHIPPED | OUTPATIENT
Start: 2025-02-14

## 2025-02-14 RX ORDER — ROSUVASTATIN CALCIUM 40 MG/1
TABLET, COATED ORAL
Qty: 90 TABLET | Refills: 3 | Status: SHIPPED | OUTPATIENT
Start: 2025-02-14

## 2025-02-14 RX ORDER — VERAPAMIL HYDROCHLORIDE 240 MG/1
240 TABLET, FILM COATED, EXTENDED RELEASE ORAL DAILY
Qty: 90 TABLET | Refills: 3 | Status: SHIPPED | OUTPATIENT
Start: 2025-02-14

## 2025-02-18 DIAGNOSIS — E78.2 MIXED HYPERLIPIDEMIA: Primary | ICD-10-CM

## 2025-02-20 RX ORDER — EZETIMIBE 10 MG/1
10 TABLET ORAL DAILY
Qty: 90 TABLET | Refills: 3 | Status: SHIPPED | OUTPATIENT
Start: 2025-02-20

## 2025-06-30 ENCOUNTER — OFFICE VISIT (OUTPATIENT)
Dept: FAMILY MEDICINE CLINIC | Age: 50
End: 2025-06-30
Payer: COMMERCIAL

## 2025-06-30 VITALS
OXYGEN SATURATION: 97 % | WEIGHT: 236 LBS | SYSTOLIC BLOOD PRESSURE: 138 MMHG | TEMPERATURE: 98.9 F | HEART RATE: 90 BPM | BODY MASS INDEX: 38.11 KG/M2 | DIASTOLIC BLOOD PRESSURE: 90 MMHG | RESPIRATION RATE: 16 BRPM

## 2025-06-30 DIAGNOSIS — R05.9 COUGH, UNSPECIFIED TYPE: ICD-10-CM

## 2025-06-30 DIAGNOSIS — J22 LOWER RESP. TRACT INFECTION: Primary | ICD-10-CM

## 2025-06-30 LAB
INFLUENZA A ANTIGEN, POC: NEGATIVE
INFLUENZA B ANTIGEN, POC: NEGATIVE
LOT EXPIRE DATE: NORMAL
LOT KIT NUMBER: 0
SARS-COV-2, POC: NORMAL
VALID INTERNAL CONTROL: 1
VENDOR AND KIT NAME POC: NORMAL

## 2025-06-30 PROCEDURE — 3075F SYST BP GE 130 - 139MM HG: CPT | Performed by: NURSE PRACTITIONER

## 2025-06-30 PROCEDURE — 87428 SARSCOV & INF VIR A&B AG IA: CPT | Performed by: NURSE PRACTITIONER

## 2025-06-30 PROCEDURE — 3080F DIAST BP >= 90 MM HG: CPT | Performed by: NURSE PRACTITIONER

## 2025-06-30 PROCEDURE — 99213 OFFICE O/P EST LOW 20 MIN: CPT | Performed by: NURSE PRACTITIONER

## 2025-06-30 RX ORDER — DEXTROMETHORPHAN HYDROBROMIDE AND PROMETHAZINE HYDROCHLORIDE 15; 6.25 MG/5ML; MG/5ML
5 SYRUP ORAL 4 TIMES DAILY PRN
Qty: 240 ML | Refills: 0 | Status: SHIPPED | OUTPATIENT
Start: 2025-06-30

## 2025-06-30 RX ORDER — AZITHROMYCIN 250 MG/1
TABLET, FILM COATED ORAL
Qty: 6 TABLET | Refills: 0 | Status: SHIPPED | OUTPATIENT
Start: 2025-06-30 | End: 2025-07-10

## 2025-06-30 RX ORDER — ALBUTEROL SULFATE 90 UG/1
2 INHALANT RESPIRATORY (INHALATION) 4 TIMES DAILY PRN
Qty: 18 G | Refills: 0 | Status: SHIPPED | OUTPATIENT
Start: 2025-06-30

## 2025-06-30 ASSESSMENT — ENCOUNTER SYMPTOMS
TROUBLE SWALLOWING: 0
SINUS PAIN: 0
GASTROINTESTINAL NEGATIVE: 1
SINUS PRESSURE: 0
COUGH: 1
CHEST TIGHTNESS: 1
SORE THROAT: 1

## 2025-06-30 NOTE — PROGRESS NOTES
6/30/2025    This is a 49 y.o. female   Chief Complaint   Patient presents with    Cough     Since Friday, clear nasal drainage, cough is so bad it makes her vomit, back and chest is hurting from cough,    .    Cough  Associated symptoms include chills, headaches, postnasal drip and a sore throat. Pertinent negatives include no ear pain or fever. There is no history of environmental allergies.       History of Present Illness  The patient presents for evaluation of a cough.    She began experiencing fatigue on Thursday, which was followed by the onset of a severe cough the next day. Despite these symptoms, she managed to carry out her daily activities on Friday and Saturday. However, she slept from Saturday afternoon until Sunday morning. Her mother, who resides with her, is also ill with similar symptoms. She reports no productive cough but mentions occasional vomiting due to intense coughing episodes. Her nasal discharge is clear. She has been self-medicating with NyQuil and Tylenol Cold and Sinus, which provide temporary relief. She has no history of asthma or diabetes. She suspects a pulled muscle in her back due to the coughing. She was previously on amoxicillin for 7 days following a dental procedure involving tooth extraction and bridge placement.    She was on Zepbound for 2 months and stopped it. She wants to start it again.    PAST SURGICAL HISTORY:  Tooth extraction and bridge placement: 7 days of amoxicillin     Patient Active Problem List   Diagnosis    Hypertension    CAD s/p NIKOLE to mid-LAD (Nov 2016)    Chronic mediastinal lymphadenopathy (noted Nov 2016)    Hyperlipidemia    Obesity (BMI 30-39.9)    Former smoker    Chronic fatigue    Acute non-recurrent frontal sinusitis    GERD (gastroesophageal reflux disease)    ABHILASH on CPAP    Contusion of right knee    Strain of left hamstring    Loose body in knee, left knee    Acute medial meniscus tear of left knee    S/P arthroscopic partial medial

## 2025-07-15 ENCOUNTER — TELEPHONE (OUTPATIENT)
Dept: FAMILY MEDICINE CLINIC | Age: 50
End: 2025-07-15

## 2025-07-15 DIAGNOSIS — E66.01 MORBID OBESITY (HCC): ICD-10-CM

## 2025-07-15 RX ORDER — TIRZEPATIDE 5 MG/.5ML
5 INJECTION, SOLUTION SUBCUTANEOUS WEEKLY
Qty: 2 ML | Refills: 0 | Status: CANCELLED | OUTPATIENT
Start: 2025-07-15

## 2025-07-15 NOTE — TELEPHONE ENCOUNTER
Patient is asking for a refill she states danya told her to just let her know when she was ready to start this medicine again. She is asking for the vial and not the pen. Please advise       Tirzepatide-Weight Management (ZEPBOUND) 2.5 MG/0.5ML SOAJ      [facility]   30 days

## 2025-07-17 ENCOUNTER — RESULTS FOLLOW-UP (OUTPATIENT)
Dept: CARDIOLOGY CLINIC | Age: 50
End: 2025-07-17

## 2025-07-17 ENCOUNTER — OFFICE VISIT (OUTPATIENT)
Dept: FAMILY MEDICINE CLINIC | Age: 50
End: 2025-07-17
Payer: COMMERCIAL

## 2025-07-17 VITALS
TEMPERATURE: 97.1 F | WEIGHT: 236 LBS | OXYGEN SATURATION: 97 % | SYSTOLIC BLOOD PRESSURE: 129 MMHG | RESPIRATION RATE: 16 BRPM | BODY MASS INDEX: 38.11 KG/M2 | HEART RATE: 86 BPM | DIASTOLIC BLOOD PRESSURE: 81 MMHG

## 2025-07-17 DIAGNOSIS — R73.03 PREDIABETES: ICD-10-CM

## 2025-07-17 DIAGNOSIS — G47.33 OSA ON CPAP: Chronic | ICD-10-CM

## 2025-07-17 DIAGNOSIS — J22 LOWER RESP. TRACT INFECTION: ICD-10-CM

## 2025-07-17 DIAGNOSIS — E78.2 MIXED HYPERLIPIDEMIA: ICD-10-CM

## 2025-07-17 DIAGNOSIS — E66.01 CLASS 2 SEVERE OBESITY DUE TO EXCESS CALORIES WITH SERIOUS COMORBIDITY AND BODY MASS INDEX (BMI) OF 38.0 TO 38.9 IN ADULT (HCC): Primary | ICD-10-CM

## 2025-07-17 DIAGNOSIS — I25.118 CORONARY ARTERY DISEASE INVOLVING NATIVE CORONARY ARTERY OF NATIVE HEART WITH OTHER FORM OF ANGINA PECTORIS: Chronic | ICD-10-CM

## 2025-07-17 DIAGNOSIS — E66.812 CLASS 2 SEVERE OBESITY DUE TO EXCESS CALORIES WITH SERIOUS COMORBIDITY AND BODY MASS INDEX (BMI) OF 38.0 TO 38.9 IN ADULT (HCC): Primary | ICD-10-CM

## 2025-07-17 DIAGNOSIS — H04.123 DRY EYE SYNDROME OF BOTH EYES: ICD-10-CM

## 2025-07-17 LAB
ALBUMIN SERPL-MCNC: 4.3 G/DL (ref 3.4–5)
ALBUMIN/GLOB SERPL: 1.4 {RATIO} (ref 1.1–2.2)
ALP SERPL-CCNC: 69 U/L (ref 40–129)
ALT SERPL-CCNC: 22 U/L (ref 10–40)
ANION GAP SERPL CALCULATED.3IONS-SCNC: 12 MMOL/L (ref 3–16)
AST SERPL-CCNC: 23 U/L (ref 15–37)
BILIRUB SERPL-MCNC: <0.2 MG/DL (ref 0–1)
BUN SERPL-MCNC: 13 MG/DL (ref 7–20)
CALCIUM SERPL-MCNC: 9.4 MG/DL (ref 8.3–10.6)
CHLORIDE SERPL-SCNC: 100 MMOL/L (ref 99–110)
CHOLEST SERPL-MCNC: 183 MG/DL (ref 0–199)
CO2 SERPL-SCNC: 24 MMOL/L (ref 21–32)
CREAT SERPL-MCNC: 0.6 MG/DL (ref 0.6–1.1)
GFR SERPLBLD CREATININE-BSD FMLA CKD-EPI: >90 ML/MIN/{1.73_M2}
GLUCOSE SERPL-MCNC: 107 MG/DL (ref 70–99)
HDLC SERPL-MCNC: 45 MG/DL (ref 40–60)
LDL CHOLESTEROL: 94 MG/DL
POTASSIUM SERPL-SCNC: 4.2 MMOL/L (ref 3.5–5.1)
PROT SERPL-MCNC: 7.4 G/DL (ref 6.4–8.2)
SODIUM SERPL-SCNC: 136 MMOL/L (ref 136–145)
TRIGL SERPL-MCNC: 221 MG/DL (ref 0–150)
VLDLC SERPL CALC-MCNC: 44 MG/DL

## 2025-07-17 PROCEDURE — 99214 OFFICE O/P EST MOD 30 MIN: CPT | Performed by: NURSE PRACTITIONER

## 2025-07-17 PROCEDURE — 3079F DIAST BP 80-89 MM HG: CPT | Performed by: NURSE PRACTITIONER

## 2025-07-17 PROCEDURE — 3074F SYST BP LT 130 MM HG: CPT | Performed by: NURSE PRACTITIONER

## 2025-07-17 RX ORDER — ALBUTEROL SULFATE 90 UG/1
INHALANT RESPIRATORY (INHALATION)
Qty: 18 G | Refills: 5 | Status: SHIPPED | OUTPATIENT
Start: 2025-07-17

## 2025-07-17 SDOH — ECONOMIC STABILITY: FOOD INSECURITY: WITHIN THE PAST 12 MONTHS, THE FOOD YOU BOUGHT JUST DIDN'T LAST AND YOU DIDN'T HAVE MONEY TO GET MORE.: NEVER TRUE

## 2025-07-17 SDOH — ECONOMIC STABILITY: FOOD INSECURITY: WITHIN THE PAST 12 MONTHS, YOU WORRIED THAT YOUR FOOD WOULD RUN OUT BEFORE YOU GOT MONEY TO BUY MORE.: NEVER TRUE

## 2025-07-17 ASSESSMENT — PATIENT HEALTH QUESTIONNAIRE - PHQ9
SUM OF ALL RESPONSES TO PHQ QUESTIONS 1-9: 0
SUM OF ALL RESPONSES TO PHQ QUESTIONS 1-9: 0
DEPRESSION UNABLE TO ASSESS: FUNCTIONAL CAPACITY MOTIVATION LIMITS ACCURACY
2. FEELING DOWN, DEPRESSED OR HOPELESS: NOT AT ALL
1. LITTLE INTEREST OR PLEASURE IN DOING THINGS: NOT AT ALL
SUM OF ALL RESPONSES TO PHQ QUESTIONS 1-9: 0
SUM OF ALL RESPONSES TO PHQ QUESTIONS 1-9: 0

## 2025-07-17 ASSESSMENT — ENCOUNTER SYMPTOMS
COUGH: 0
SHORTNESS OF BREATH: 0
NAUSEA: 0
DIARRHEA: 0
VOMITING: 0

## 2025-07-17 NOTE — PROGRESS NOTES
2025     Sussy Casillas (:  1975) is a 49 y.o. female, here for evaluation of the following medical concerns:    Chief Complaint   Patient presents with    Discuss Medications     Would like to get back on the Zepbound only took it for two months and has been off it for 7 months due to cost      Other     Would like to discuss her eye ( dry eye)        HPI:  History of Present Illness  The patient is a 49-year-old female who presents for follow-up.     has a past medical history of CAD (coronary artery disease), GERD (gastroesophageal reflux disease), Heart attack (HCC), Hyperlipidemia, Hypertension, Intractable nausea and vomiting, Obesity (BMI 30-39.9), and ABHILASH on CPAP.      She had been on Zepbound for 2 months in the summer of  but discontinued it due to cost concerns. She has now found a more affordable source for the medication and is considering resuming it. She reports no side effects from the medication when she took it previously. She has recently switched to a new insurance plan.     She has sleep apnea and owns a CPAP machine, which she used for a while due to worsening snoring and breathing issues. However, she discontinued its use after experiencing headaches. Needs to follow up with sleep clinic.     Efforts to improve diet and exercise habits include attending [employer] 3 times a week for the past month and playing pickleball every other Friday. Despite these efforts, her weight has remained stable over the past few months. She is consistently taking all her other medications. She has never experienced pancreatitis and has no family history of medullary thyroid carcinoma or multiple endocrine neoplasia syndrome type 2.    She has been using over-the-counter eye drops for dry eyes, but they have not been effective. She has not seen an eye doctor recently.    Social History:  Occupations:  (part-time), wheelchair full-time  Hobbies: [employer],

## 2025-07-17 NOTE — PATIENT INSTRUCTIONS
Start Zepbound injection weekly injection, take medication on the same day each week  Take 2.5 mg weekly x 4 weeks ad then increase dose to 5 mg weekly  Continue to work on diet and exercise  Focus on strength training and high protein  Please complete blood work

## 2025-07-18 LAB
EST. AVERAGE GLUCOSE BLD GHB EST-MCNC: 119.8 MG/DL
HBA1C MFR BLD: 5.8 %

## 2025-07-21 RX ORDER — EVOLOCUMAB 140 MG/ML
140 INJECTION, SOLUTION SUBCUTANEOUS
Qty: 2.1 ADJUSTABLE DOSE PRE-FILLED PEN SYRINGE | Refills: 11 | Status: SHIPPED | OUTPATIENT
Start: 2025-07-21

## 2025-07-24 ENCOUNTER — TELEPHONE (OUTPATIENT)
Dept: ADMINISTRATIVE | Age: 50
End: 2025-07-24

## 2025-07-24 NOTE — TELEPHONE ENCOUNTER
Pt called. She needs an RX sent to pharmacy for the Zepbound vials now. [facility] told her they only received the Rx for the pen.     Pt is also asking that a PA be started for the pens.     Please advise.

## 2025-07-28 NOTE — TELEPHONE ENCOUNTER
Submitted PA for tirzepatide-weight management (ZEPBOUND) 5 MG/0.5ML SOAJ subCUTAneous auto-injector pen   Via CMM (Key: [id number]) STATUS: PENDING.    Follow up done daily; if no decision with in three days we will refax.  If another three days goes by with no decision will call the insurance for status.

## 2025-07-29 NOTE — TELEPHONE ENCOUNTER
This medication is a Plan Exclusion; not a Denial.  The option for Appeal is not available because it is not a covered product.    Note :    The requested medication is not covered because it is not on the list of approved drugs for your plan benefit. Please discuss alternative drug therapy with your provider. The request for coverage for ZEPBOUND INJ 5/0.5ML, use as directed (2 per month), is denied. This decision is based on health plan criteria for ZEPBOUND INJ 5/0.5ML. This medicine is covered only if: (1) The use of the drug is not excluded as documented in the limitation and exclusions section.    If the patient is persistent that they want a specific medication that is not covered by the plan; then they can call the insurance and attempt to get a Formulary Override, or a Coverage Determination.      If this requires a response please respond to the pool ( P [facility] MEDICATION PRE-AUTH).      Thank you please advise patient.

## 2025-07-30 NOTE — TELEPHONE ENCOUNTER
Patient called in  Can we send the single dose vials to [facility].    [facility] does not carry the single dose vials.

## 2025-08-25 ENCOUNTER — OFFICE VISIT (OUTPATIENT)
Dept: FAMILY MEDICINE CLINIC | Age: 50
End: 2025-08-25
Payer: COMMERCIAL

## 2025-08-25 VITALS
WEIGHT: 234 LBS | RESPIRATION RATE: 16 BRPM | SYSTOLIC BLOOD PRESSURE: 139 MMHG | TEMPERATURE: 97.5 F | BODY MASS INDEX: 37.79 KG/M2 | OXYGEN SATURATION: 95 % | DIASTOLIC BLOOD PRESSURE: 84 MMHG | HEART RATE: 67 BPM

## 2025-08-25 DIAGNOSIS — G47.33 OSA ON CPAP: ICD-10-CM

## 2025-08-25 DIAGNOSIS — E66.01 CLASS 2 SEVERE OBESITY DUE TO EXCESS CALORIES WITH SERIOUS COMORBIDITY AND BODY MASS INDEX (BMI) OF 38.0 TO 38.9 IN ADULT (HCC): Primary | ICD-10-CM

## 2025-08-25 DIAGNOSIS — E66.812 CLASS 2 SEVERE OBESITY DUE TO EXCESS CALORIES WITH SERIOUS COMORBIDITY AND BODY MASS INDEX (BMI) OF 38.0 TO 38.9 IN ADULT (HCC): Primary | ICD-10-CM

## 2025-08-25 PROCEDURE — 99213 OFFICE O/P EST LOW 20 MIN: CPT | Performed by: NURSE PRACTITIONER

## 2025-08-25 PROCEDURE — 3075F SYST BP GE 130 - 139MM HG: CPT | Performed by: NURSE PRACTITIONER

## 2025-08-25 PROCEDURE — 3079F DIAST BP 80-89 MM HG: CPT | Performed by: NURSE PRACTITIONER

## 2025-08-25 RX ORDER — SCOPOLAMINE 1 MG/3D
1 PATCH, EXTENDED RELEASE TRANSDERMAL
Qty: 5 PATCH | Refills: 0 | Status: SHIPPED | OUTPATIENT
Start: 2025-08-25

## 2025-08-25 RX ORDER — SCOPOLAMINE 1 MG/3D
1 PATCH, EXTENDED RELEASE TRANSDERMAL
Qty: 5 PATCH | Refills: 0 | Status: SHIPPED | OUTPATIENT
Start: 2025-08-25 | End: 2025-08-25 | Stop reason: SDUPTHER

## 2025-08-25 SDOH — ECONOMIC STABILITY: FOOD INSECURITY: WITHIN THE PAST 12 MONTHS, YOU WORRIED THAT YOUR FOOD WOULD RUN OUT BEFORE YOU GOT MONEY TO BUY MORE.: NEVER TRUE

## 2025-08-25 SDOH — ECONOMIC STABILITY: FOOD INSECURITY: WITHIN THE PAST 12 MONTHS, THE FOOD YOU BOUGHT JUST DIDN'T LAST AND YOU DIDN'T HAVE MONEY TO GET MORE.: NEVER TRUE

## 2025-08-25 ASSESSMENT — ENCOUNTER SYMPTOMS
SHORTNESS OF BREATH: 0
DIARRHEA: 0
VOMITING: 0
NAUSEA: 0
COUGH: 0

## 2025-08-25 ASSESSMENT — PATIENT HEALTH QUESTIONNAIRE - PHQ9
SUM OF ALL RESPONSES TO PHQ QUESTIONS 1-9: 0
DEPRESSION UNABLE TO ASSESS: FUNCTIONAL CAPACITY MOTIVATION LIMITS ACCURACY
SUM OF ALL RESPONSES TO PHQ QUESTIONS 1-9: 0
2. FEELING DOWN, DEPRESSED OR HOPELESS: NOT AT ALL
1. LITTLE INTEREST OR PLEASURE IN DOING THINGS: NOT AT ALL

## 2025-09-03 DIAGNOSIS — E03.9 HYPOTHYROIDISM, UNSPECIFIED TYPE: Primary | ICD-10-CM

## 2025-09-03 RX ORDER — ROSUVASTATIN CALCIUM 40 MG/1
40 TABLET, COATED ORAL EVERY EVENING
Qty: 90 TABLET | Refills: 0 | OUTPATIENT
Start: 2025-09-03

## 2025-09-03 RX ORDER — LEVOTHYROXINE SODIUM 75 UG/1
75 TABLET ORAL DAILY
Qty: 90 TABLET | Refills: 0 | Status: SHIPPED | OUTPATIENT
Start: 2025-09-03

## (undated) DEVICE — BLADE SHV L13CM DIA4MM EXCALIBUR AGG COOLCUT

## (undated) DEVICE — WEREWOLF FLOW 50 COBLATION WAND: Brand: COBLATION

## (undated) DEVICE — STERILE LATEX POWDER-FREE SURGICAL GLOVESWITH NITRILE COATING: Brand: PROTEXIS

## (undated) DEVICE — PACK PROCEDURE SURG SURGERYARTHROSCOPY KNEE

## (undated) DEVICE — Z INACTIVE NO SUPPLIER SOLUTIONIRRIG 3000ML 0.9% SOD CHL FLX CONT [79720808] [HOSPIRA WORLDWIDE INC]

## (undated) DEVICE — OCCLUSIVE GAUZE STRIP,3% BISMUTH TRIBROMOPHENATE IN PETROLATUM BLEND: Brand: XEROFORM

## (undated) DEVICE — SUTURE ETHLN SZ 4-0 L18IN NONABSORBABLE BLK L19MM PS-2 3/8 1667H

## (undated) DEVICE — PADDING CAST N ADH 12X6 IN CRIMPED FINISH 100% COTTON WBRLII

## (undated) DEVICE — GLOVE SURG SZ 7 L12IN FNGR THK94MIL STD WHT ISOLEX LTX FREE

## (undated) DEVICE — SOLUTION,SALINE,IRRGATION,500ML,STRL: Brand: MEDLINE

## (undated) DEVICE — BANDAGE COMPR M W6INXL10YD WHT BGE VELC E MTRX HK AND LOOP

## (undated) DEVICE — MANIFOLD SURG NEPTUNE WST MGMT

## (undated) DEVICE — TUBING PMP L16FT MAIN DISP FOR AR-6400 AR-6475

## (undated) DEVICE — TUBING PMP L6FT CONT WAVE EXTN